# Patient Record
Sex: FEMALE | Race: WHITE | NOT HISPANIC OR LATINO | Employment: OTHER | URBAN - METROPOLITAN AREA
[De-identification: names, ages, dates, MRNs, and addresses within clinical notes are randomized per-mention and may not be internally consistent; named-entity substitution may affect disease eponyms.]

---

## 2017-01-25 ENCOUNTER — OFFICE VISIT (OUTPATIENT)
Dept: MEDICAL GROUP | Facility: MEDICAL CENTER | Age: 77
End: 2017-01-25
Payer: MEDICARE

## 2017-01-25 VITALS
WEIGHT: 154.4 LBS | HEART RATE: 68 BPM | BODY MASS INDEX: 26.36 KG/M2 | DIASTOLIC BLOOD PRESSURE: 70 MMHG | TEMPERATURE: 98.6 F | OXYGEN SATURATION: 93 % | HEIGHT: 64 IN | RESPIRATION RATE: 14 BRPM | SYSTOLIC BLOOD PRESSURE: 116 MMHG

## 2017-01-25 DIAGNOSIS — R53.82 CHRONIC FATIGUE: ICD-10-CM

## 2017-01-25 DIAGNOSIS — M25.511 CHRONIC RIGHT SHOULDER PAIN: ICD-10-CM

## 2017-01-25 DIAGNOSIS — J45.20 ASTHMA IN ADULT, MILD INTERMITTENT, UNCOMPLICATED: ICD-10-CM

## 2017-01-25 DIAGNOSIS — Z23 NEED FOR INFLUENZA VACCINATION: ICD-10-CM

## 2017-01-25 DIAGNOSIS — Z23 NEED FOR SHINGLES VACCINE: ICD-10-CM

## 2017-01-25 DIAGNOSIS — F32.89 OTHER DEPRESSION: ICD-10-CM

## 2017-01-25 DIAGNOSIS — G89.29 CHRONIC RIGHT SHOULDER PAIN: ICD-10-CM

## 2017-01-25 DIAGNOSIS — Z23 NEED FOR PNEUMOCOCCAL VACCINATION: ICD-10-CM

## 2017-01-25 PROCEDURE — 1036F TOBACCO NON-USER: CPT | Performed by: INTERNAL MEDICINE

## 2017-01-25 PROCEDURE — 90662 IIV NO PRSV INCREASED AG IM: CPT | Performed by: INTERNAL MEDICINE

## 2017-01-25 PROCEDURE — 90732 PPSV23 VACC 2 YRS+ SUBQ/IM: CPT | Performed by: INTERNAL MEDICINE

## 2017-01-25 PROCEDURE — G0008 ADMIN INFLUENZA VIRUS VAC: HCPCS | Performed by: INTERNAL MEDICINE

## 2017-01-25 PROCEDURE — G8420 CALC BMI NORM PARAMETERS: HCPCS | Performed by: INTERNAL MEDICINE

## 2017-01-25 PROCEDURE — G0009 ADMIN PNEUMOCOCCAL VACCINE: HCPCS | Performed by: INTERNAL MEDICINE

## 2017-01-25 PROCEDURE — 1101F PT FALLS ASSESS-DOCD LE1/YR: CPT | Performed by: INTERNAL MEDICINE

## 2017-01-25 PROCEDURE — 90736 HZV VACCINE LIVE SUBQ: CPT | Performed by: INTERNAL MEDICINE

## 2017-01-25 PROCEDURE — 99214 OFFICE O/P EST MOD 30 MIN: CPT | Mod: 25 | Performed by: INTERNAL MEDICINE

## 2017-01-25 PROCEDURE — G8482 FLU IMMUNIZE ORDER/ADMIN: HCPCS | Performed by: INTERNAL MEDICINE

## 2017-01-25 PROCEDURE — 4040F PNEUMOC VAC/ADMIN/RCVD: CPT | Performed by: INTERNAL MEDICINE

## 2017-01-25 RX ORDER — FLUTICASONE PROPIONATE 220 UG/1
2 AEROSOL, METERED RESPIRATORY (INHALATION) 2 TIMES DAILY
Qty: 1 INHALER | Refills: 5 | Status: SHIPPED | OUTPATIENT
Start: 2017-01-25 | End: 2017-03-21

## 2017-01-25 NOTE — MR AVS SNAPSHOT
"        Magdi Gutierrezyifan   2017 9:40 AM   Office Visit   MRN: [id number]    Department:  [address] [place] [address]   Dept Phone:  [phone]    Description:  Female : 1940   Provider:  Goldy Owens M.D.           Reason for Visit     Cough states for 5 weeks, had some xrays done end of dec.    Immunizations flu, and pneumonia, and shingles      Allergies as of 2017     Allergen Noted Reactions    Asa [Aspirin] 2010       Does not take D/T asthma hx    Quinine 10/27/2014       Sulfa Drugs 2010   Rash    Skin peeling      You were diagnosed with     Asthma in adult, mild intermittent, uncomplicated   [6617253]       Need for influenza vaccination   [333897]       Need for pneumococcal vaccination   [780064]       Need for shingles vaccine   [277904]       Chronic fatigue   [561050]       Chronic right shoulder pain   [700295]         Vital Signs     Blood Pressure Pulse Temperature Respirations Height Weight    116/70 mmHg 68 37 °C (98.6 °F) 14 1.638 m (5' 4.49\") 70.035 kg (154 lb 6.4 oz)    Body Mass Index Oxygen Saturation Smoking Status             26.10 kg/m2 93% Never Smoker          Basic Information     Date Of Birth Sex Race Ethnicity Preferred Language    1940 Female White Non- English      Your appointments     Mar 21, 2017 11:00 AM   NEW TO YOU with Nicole Holbrook M.D.   [facility] [address]    [facility] [address]  [facility] [id number] [phone]              Problem List              ICD-10-CM Priority Class Noted - Resolved    Osteoarthritis of hands, bilateral M19.041, M19.042   3/26/2014 - Present    Rheumatoid factor positive R76.8   3/26/2014 - Present    Asthma in adult J45.909   3/26/2014 - Present    Lumbar disc disease M51.9   3/26/2014 - Present    Menopause Z78.0   3/26/2014 - Present    Migraines G43.909   3/26/2014 - Present    Hypothyroid E03.9   3/26/2014 - Present    H/O varicose vein ligation Z98.890, Z86.79   " 3/26/2014 - Present    Seasonal allergies J30.2   3/26/2014 - Present    History of breast cancer Z85.3   3/26/2014 - Present    Depression F32.9   3/26/2014 - Present    Bunion of great toe M21.619   3/26/2014 - Present    Bilateral thigh pain M79.651, M79.652   7/19/2016 - Present    Intention tremor G25.2   9/28/2016 - Present      Health Maintenance        Date Due Completion Dates    IMM DTaP/Tdap/Td Vaccine (1 - Tdap) 12/20/1957 ---    IMM ZOSTER VACCINE 12/20/1998 ---    IMM INFLUENZA (1) 9/1/2016 1/11/2016, 10/15/2014    MAMMOGRAM 12/3/2016 12/3/2015    IMM PNEUMOCOCCAL 65+ (ADULT) LOW/MEDIUM RISK SERIES (2 of 2 - PPSV23) 1/11/2017 1/11/2016            Current Immunizations     13-VALENT PCV PREVNAR 1/11/2016 10:25 AM    Influenza TIV (IM) 10/15/2014    Influenza Vaccine Adult HD  Incomplete, 1/11/2016 10:26 AM    Pneumococcal polysaccharide vaccine (PPSV-23)  Incomplete    SHINGLES VACCINE  Incomplete      Below and/or attached are the medications your provider expects you to take. Review all of your home medications and newly ordered medications with your provider and/or pharmacist. Follow medication instructions as directed by your provider and/or pharmacist. Please keep your medication list with you and share with your provider. Update the information when medications are discontinued, doses are changed, or new medications (including over-the-counter products) are added; and carry medication information at all times in the event of emergency situations     Allergies:  ASA - (reactions not documented)     QUININE - (reactions not documented)     SULFA DRUGS - Rash               Medications  Valid as of: January 25, 2017 - 11:10 AM    Generic Name Brand Name Tablet Size Instructions for use    Cyanocobalamin   Take  by mouth every day.        Fluticasone Propionate HFA (Aerosol) FLOVENT  MCG/ACT Inhale 2 Puffs by mouth 2 times a day.        Levalbuterol Tartrate (Aerosol) XOPENEX HFA 45 MCG/ACT  Inhale 2 Puffs by mouth every four hours as needed for Shortness of Breath.        Multiple Vitamins-Minerals   Take 1 Tab by mouth every day.        Thyroid (Tab) ARMOUR THYROID 60 MG Take 1 Tab by mouth every day.        Triamterene-HCTZ (Tab) MAXZIDE-25/DYAZIDE 37.5-25 MG Take 1 Tab by mouth every day.        .                 Medicines prescribed today were sent to:     [facility]ARMACY #[id number] - [place], [place] - [address]    [address] [place] [address]    Phone: [phone] Fax: [phone]    Open 24 Hours?: No      Medication refill instructions:       If your prescription bottle indicates you have medication refills left, it is not necessary to call your provider’s office. Please contact your pharmacy and they will refill your medication.    If your prescription bottle indicates you do not have any refills left, you may request refills at any time through one of the following ways: The online [employer] system (except Urgent Care), by calling your provider’s office, or by asking your pharmacy to contact your provider’s office with a refill request. Medication refills are processed only during regular business hours and may not be available until the next business day. Your provider may request additional information or to have a follow-up visit with you prior to refilling your medication.   *Please Note: Medication refills are assigned a new Rx number when refilled electronically. Your pharmacy may indicate that no refills were authorized even though a new prescription for the same medication is available at the pharmacy. Please request the medicine by name with the pharmacy before contacting your provider for a refill.        Your To Do List     Future Labs/Procedures Complete By Expires    CBC WITH DIFFERENTIAL  As directed 1/26/2018    COMP METABOLIC PANEL  As directed 1/26/2018    CRP QUANTITIVE (NON-CARDIAC)  As directed 1/26/2018    DX-SHOULDER 2+ RIGHT  As directed 1/25/2018    TSH  As directed  1/26/2018         Back& Access Code: [email or web address]  Expires: 2/24/2017 11:10 AM    Back&  A secure, online tool to manage your health information     [employer]’s Back&® is a secure, online tool that connects you to your personalized health information from the privacy of your home -- day or night - making it very easy for you to manage your healthcare. Once the activation process is completed, you can even access your medical information using the Back& suresh, which is available for free in the Apple Suresh store or Google Play store.     Back& provides the following levels of access (as shown below):   My Chart Features   [facility] Primary Care Doctor [facility]  Specialists [facility]  Urgent  Care Non-[facility]  Primary Care  Doctor   Email your healthcare team securely and privately 24/7 X X X    Manage appointments: schedule your next appointment; view details of past/upcoming appointments X      Request prescription refills. X      View recent personal medical records, including lab and immunizations X X X X   View health record, including health history, allergies, medications X X X X   Read reports about your outpatient visits, procedures, consult and ER notes X X X X   See your discharge summary, which is a recap of your hospital and/or ER visit that includes your diagnosis, lab results, and care plan. X X       How to register for Back&:  1. Go to  [email or web address].  2. Click on the Sign Up Now box, which takes you to the New Member Sign Up page. You will need to provide the following information:  a. Enter your Back& Access Code exactly as it appears at the top of this page. (You will not need to use this code after you’ve completed the sign-up process. If you do not sign up before the expiration date, you must request a new code.)   b. Enter your date of birth.   c. Enter your home email address.   d. Click Submit, and follow the next screen’s instructions.  3. Create a Back& ID. This will  be your [employer] login ID and cannot be changed, so think of one that is secure and easy to remember.  4. Create a [employer] password. You can change your password at any time.  5. Enter your Password Reset Question and Answer. This can be used at a later time if you forget your password.   6. Enter your e-mail address. This allows you to receive e-mail notifications when new information is available in [employer].  7. Click Sign Up. You can now view your health information.    For assistance activating your [employer] account, call [phone]

## 2017-01-26 NOTE — PROGRESS NOTES
"Magdi is a 76 y.o. female who is here today because    Chief Complaint   Patient presents with   • Cough     states for 5 weeks, had some xrays done end of dec.   • Immunizations     flu, and pneumonia, and shingles       The patient's current problem list and medication list is:    Patient Active Problem List    Diagnosis Date Noted   • Intention tremor 09/28/2016   • Bilateral thigh pain 07/19/2016   • Osteoarthritis of hands, bilateral 03/26/2014   • Rheumatoid factor positive 03/26/2014   • Asthma in adult 03/26/2014   • Lumbar disc disease 03/26/2014   • Migraines 03/26/2014   • Hypothyroid 03/26/2014   • H/O varicose vein ligation 03/26/2014   • Seasonal allergies 03/26/2014   • History of breast cancer 03/26/2014   • Depression 03/26/2014   • Bunion of great toe 03/26/2014       Current Outpatient Prescriptions   Medication Sig Dispense Refill   • fluticasone (FLOVENT HFA) 220 MCG/ACT Aerosol Inhale 2 Puffs by mouth 2 times a day. 1 Inhaler 5   • triamterene-hctz (MAXZIDE-25/DYAZIDE) 37.5-25 MG Tab Take 1 Tab by mouth every day. 90 Tab 2   • thyroid (ARMOUR THYROID) 60 MG Tab Take 1 Tab by mouth every day. 90 Tab 2   • Cyanocobalamin (VITAMIN B-12 PO) Take  by mouth every day.     • Multiple Vitamin (MULTIVITAMIN PO) Take 1 Tab by mouth every day.     • levalbuterol (XOPENEX HFA) 45 MCG/ACT inhaler Inhale 2 Puffs by mouth every four hours as needed for Shortness of Breath. 1 Inhaler 0     No current facility-administered medications for this visit.     AMENA Fairbanks returns for follow up at 4 months. Diagnoses of Asthma in adult, mild intermittent, uncomplicated, Chronic fatigue, Chronic right shoulder pain, depression, Need for influenza vaccination, Need for pneumococcal vaccination, and Need for shingles vaccine were pertinent to this visit.    1. Asthma in adult, mild intermittent, uncomplicated  No acute breathing problems, but she wonders if the sympathomimetic is making her \"trembly.\"    2. Chronic " "fatigue  Feels tired all the time. Difficult to get out of bed in the morning. Sleeps well.     3. Chronic right shoulder pain  Continuing right shoulder pain, anterior. No trauma. Right dominant.     4. Depression  Likes her home in [place], but is not settled yet. 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     5. Need for influenza vaccination    6. Need for pneumococcal vaccination    7. Need for shingles vaccine    ROS Denies chest pain, shortness of breath, changes in bowel and bladder function, lower extremity edema.    Allergies as of 01/25/2017 - Malachi as Reviewed 01/25/2017   Allergen Reaction Noted   • Asa [aspirin]  02/26/2010   • Quinine  10/27/2014   • Sulfa drugs Rash 02/23/2010      /70 mmHg  Pulse 68  Temp(Src) 37 °C (98.6 °F)  Resp 14  Ht 1.638 m (5' 4.49\")  Wt 70.035 kg (154 lb 6.4 oz)  BMI 26.10 kg/m2  SpO2 93%    PHYSICAL EXAMINATION  Gen:         Alert and oriented, No apparent distress.  Neck:       No Jugular venous distension, Lymphadenopathy, Thyromegaly, Bruits.  Lungs:     Clear to auscultation bilaterally  CV:          Regular rate and rhythm. No murmur heard.  Abd:         Soft, non distended.                Ext:          No clubbing, cyanosis, edema. Anterior right shoulder tenderness. Full ROM.       ASSESSMENT AND PLAN     76 y.o. female     1. Asthma in adult, mild intermittent, uncomplicated  Change from Advair to Flovent to determine if the long-acting bronchodilator is exacerbating her tremor  - fluticasone (FLOVENT HFA) 220 MCG/ACT Aerosol; Inhale 2 Puffs by mouth 2 times a day.  Dispense: 1 Inhaler; Refill: 5    2. Chronic fatigue  - TSH; Future  - COMP METABOLIC PANEL; Future  - CBC WITH DIFFERENTIAL; Future  - CRP QUANTITIVE (NON-CARDIAC); Future    3. Chronic right shoulder pain  - DX-SHOULDER 2+ RIGHT; Future    4. Depression  Mild and chronic. We discussed looking for volunteer opportunities.     5. Need for influenza " vaccination  - INFLUENZA VACCINE, HIGH DOSE (65+ ONLY)    6. Need for pneumococcal vaccination  - PneumoVax PPV23 =>1yo    7. Need for shingles vaccine  - VARICELLA ZOSTER VACCINE SQ      Followup: 2 months. Dr. Holbrook

## 2017-01-27 ENCOUNTER — HOSPITAL ENCOUNTER (OUTPATIENT)
Dept: RADIOLOGY | Facility: MEDICAL CENTER | Age: 77
End: 2017-01-27
Attending: INTERNAL MEDICINE
Payer: MEDICARE

## 2017-01-27 ENCOUNTER — HOSPITAL ENCOUNTER (OUTPATIENT)
Dept: LAB | Facility: MEDICAL CENTER | Age: 77
End: 2017-01-27
Attending: INTERNAL MEDICINE
Payer: MEDICARE

## 2017-01-27 DIAGNOSIS — G89.29 CHRONIC RIGHT SHOULDER PAIN: ICD-10-CM

## 2017-01-27 DIAGNOSIS — M25.511 CHRONIC RIGHT SHOULDER PAIN: ICD-10-CM

## 2017-01-27 DIAGNOSIS — R53.82 CHRONIC FATIGUE: ICD-10-CM

## 2017-01-27 LAB
ALBUMIN SERPL BCP-MCNC: 4.4 G/DL (ref 3.2–4.9)
ALBUMIN/GLOB SERPL: 1.6 G/DL
ALP SERPL-CCNC: 71 U/L (ref 30–99)
ALT SERPL-CCNC: 51 U/L (ref 2–50)
ANION GAP SERPL CALC-SCNC: 5 MMOL/L (ref 0–11.9)
AST SERPL-CCNC: 39 U/L (ref 12–45)
BASOPHILS # BLD AUTO: 0.1 K/UL (ref 0–0.12)
BASOPHILS NFR BLD AUTO: 1.1 % (ref 0–1.8)
BILIRUB SERPL-MCNC: 0.7 MG/DL (ref 0.1–1.5)
BUN SERPL-MCNC: 20 MG/DL (ref 8–22)
CALCIUM SERPL-MCNC: 10.1 MG/DL (ref 8.5–10.5)
CHLORIDE SERPL-SCNC: 103 MMOL/L (ref 96–112)
CO2 SERPL-SCNC: 29 MMOL/L (ref 20–33)
CREAT SERPL-MCNC: 0.61 MG/DL (ref 0.5–1.4)
CRP SERPL HS-MCNC: 1.62 MG/DL (ref 0–0.75)
EOSINOPHIL # BLD: 0.74 K/UL (ref 0–0.51)
EOSINOPHIL NFR BLD AUTO: 7.9 % (ref 0–6.9)
ERYTHROCYTE [DISTWIDTH] IN BLOOD BY AUTOMATED COUNT: 44.7 FL (ref 35.9–50)
GLOBULIN SER CALC-MCNC: 2.8 G/DL (ref 1.9–3.5)
GLUCOSE SERPL-MCNC: 81 MG/DL (ref 65–99)
HCT VFR BLD AUTO: 43.8 % (ref 37–47)
HGB BLD-MCNC: 14.4 G/DL (ref 12–16)
IMM GRANULOCYTES # BLD AUTO: 0.02 K/UL (ref 0–0.11)
IMM GRANULOCYTES NFR BLD AUTO: 0.2 % (ref 0–0.9)
LYMPHOCYTES # BLD: 1.76 K/UL (ref 1–4.8)
LYMPHOCYTES NFR BLD AUTO: 18.7 % (ref 22–41)
MCH RBC QN AUTO: 32.3 PG (ref 27–33)
MCHC RBC AUTO-ENTMCNC: 32.9 G/DL (ref 33.6–35)
MCV RBC AUTO: 98.2 FL (ref 81.4–97.8)
MONOCYTES # BLD: 1.09 K/UL (ref 0–0.85)
MONOCYTES NFR BLD AUTO: 11.6 % (ref 0–13.4)
NEUTROPHILS # BLD: 5.68 K/UL (ref 2–7.15)
NEUTROPHILS NFR BLD AUTO: 60.5 % (ref 44–72)
NRBC # BLD AUTO: 0 K/UL
NRBC BLD-RTO: 0 /100 WBC
PLATELET # BLD AUTO: 395 K/UL (ref 164–446)
PMV BLD AUTO: 9.7 FL (ref 9–12.9)
POTASSIUM SERPL-SCNC: 4.3 MMOL/L (ref 3.6–5.5)
PROT SERPL-MCNC: 7.2 G/DL (ref 6–8.2)
RBC # BLD AUTO: 4.46 M/UL (ref 4.2–5.4)
SODIUM SERPL-SCNC: 137 MMOL/L (ref 135–145)
TSH SERPL DL<=0.005 MIU/L-ACNC: 0.33 UIU/ML (ref 0.3–3.7)
WBC # BLD AUTO: 9.4 K/UL (ref 4.8–10.8)

## 2017-01-27 PROCEDURE — 73030 X-RAY EXAM OF SHOULDER: CPT | Mod: RT

## 2017-02-02 ENCOUNTER — TELEPHONE (OUTPATIENT)
Dept: MEDICAL GROUP | Facility: MEDICAL CENTER | Age: 77
End: 2017-02-02

## 2017-02-02 DIAGNOSIS — J45.21 ASTHMA IN ADULT, MILD INTERMITTENT, WITH ACUTE EXACERBATION: ICD-10-CM

## 2017-02-02 DIAGNOSIS — J30.2 SEASONAL ALLERGIC RHINITIS, UNSPECIFIED ALLERGIC RHINITIS TRIGGER: ICD-10-CM

## 2017-02-02 RX ORDER — LEVALBUTEROL TARTRATE 45 UG/1
2 AEROSOL, METERED ORAL EVERY 4 HOURS PRN
Qty: 1 INHALER | Refills: 0 | Status: SHIPPED | OUTPATIENT
Start: 2017-02-02 | End: 2017-02-03

## 2017-02-02 NOTE — TELEPHONE ENCOUNTER
1. Caller Name: [facility]                      Call Back Number: [phone]    2. Message: States Pt needs a new Rx for ProAir HFA. This med is formulary to Pt's insurance. Can you please write a new Rx and send to [facility]. Thanks    3. Patient approves office to leave a detailed voicemail/MyChart message: N\A

## 2017-02-03 ENCOUNTER — HOSPITAL ENCOUNTER (OUTPATIENT)
Dept: RADIOLOGY | Facility: MEDICAL CENTER | Age: 77
End: 2017-02-03
Attending: NURSE PRACTITIONER
Payer: MEDICARE

## 2017-02-03 ENCOUNTER — OFFICE VISIT (OUTPATIENT)
Dept: MEDICAL GROUP | Facility: MEDICAL CENTER | Age: 77
End: 2017-02-03
Payer: MEDICARE

## 2017-02-03 VITALS
OXYGEN SATURATION: 92 % | RESPIRATION RATE: 16 BRPM | TEMPERATURE: 98.9 F | DIASTOLIC BLOOD PRESSURE: 70 MMHG | BODY MASS INDEX: 25.78 KG/M2 | HEART RATE: 78 BPM | HEIGHT: 64 IN | WEIGHT: 151 LBS | SYSTOLIC BLOOD PRESSURE: 132 MMHG

## 2017-02-03 DIAGNOSIS — J45.901 ASTHMA EXACERBATION: ICD-10-CM

## 2017-02-03 DIAGNOSIS — J45.20 ASTHMA IN ADULT, MILD INTERMITTENT, UNCOMPLICATED: ICD-10-CM

## 2017-02-03 PROCEDURE — 4040F PNEUMOC VAC/ADMIN/RCVD: CPT | Performed by: NURSE PRACTITIONER

## 2017-02-03 PROCEDURE — 99214 OFFICE O/P EST MOD 30 MIN: CPT | Performed by: NURSE PRACTITIONER

## 2017-02-03 PROCEDURE — G8420 CALC BMI NORM PARAMETERS: HCPCS | Performed by: NURSE PRACTITIONER

## 2017-02-03 PROCEDURE — G8432 DEP SCR NOT DOC, RNG: HCPCS | Performed by: NURSE PRACTITIONER

## 2017-02-03 PROCEDURE — 71020 DX-CHEST-2 VIEWS: CPT

## 2017-02-03 PROCEDURE — 1036F TOBACCO NON-USER: CPT | Performed by: NURSE PRACTITIONER

## 2017-02-03 PROCEDURE — 1101F PT FALLS ASSESS-DOCD LE1/YR: CPT | Performed by: NURSE PRACTITIONER

## 2017-02-03 PROCEDURE — G8482 FLU IMMUNIZE ORDER/ADMIN: HCPCS | Performed by: NURSE PRACTITIONER

## 2017-02-03 RX ORDER — AZITHROMYCIN 250 MG/1
TABLET, FILM COATED ORAL
Qty: 1 QUANTITY SUFFICIENT | Refills: 0 | Status: SHIPPED | OUTPATIENT
Start: 2017-02-03 | End: 2017-03-21

## 2017-02-03 RX ORDER — METHYLPREDNISOLONE 4 MG/1
4 TABLET ORAL DAILY
Qty: 30 TAB | Refills: 0 | Status: SHIPPED | OUTPATIENT
Start: 2017-02-03 | End: 2017-03-21

## 2017-02-03 ASSESSMENT — ENCOUNTER SYMPTOMS
SHORTNESS OF BREATH: 1
COUGH: 1
SPUTUM PRODUCTION: 1

## 2017-02-03 NOTE — MR AVS SNAPSHOT
"Magdi Pittman   2/3/2017 10:40 AM   Office Visit   MRN: [id number]    Department:  [address] [place] [address]   Dept Phone:  [phone]    Description:  Female : 1940   Provider:  KORINA wOen           Reason for Visit     Shortness of Breath pt is fighting a brinchitissince dicember .    Cough           Allergies as of 2/3/2017     Allergen Noted Reactions    Asa [Aspirin] 2010       Does not take D/T asthma hx    Quinine 10/27/2014       Sulfa Drugs 2010   Rash    Skin peeling      You were diagnosed with     Asthma exacerbation   [200252]       Asthma in adult, mild intermittent, uncomplicated   [4347776]         Vital Signs     Blood Pressure Pulse Temperature Respirations Height Weight    132/70 mmHg 78 37.2 °C (98.9 °F) 16 1.626 m (5' 4.02\") 68.493 kg (151 lb)    Body Mass Index Oxygen Saturation Smoking Status             25.91 kg/m2 92% Never Smoker          Basic Information     Date Of Birth Sex Race Ethnicity Preferred Language    1940 Female White Non- English      Your appointments     Mar 21, 2017 11:00 AM   NEW TO YOU with Nicole Holbrook M.D.   [facility] [address]    [address] [place] [id number] [phone]              Problem List              ICD-10-CM Priority Class Noted - Resolved    Osteoarthritis of hands, bilateral M19.041, M19.042   3/26/2014 - Present    Rheumatoid factor positive R76.8   3/26/2014 - Present    Asthma in adult J45.909   3/26/2014 - Present    Lumbar disc disease M51.9   3/26/2014 - Present    Migraines G43.909   3/26/2014 - Present    Hypothyroid E03.9   3/26/2014 - Present    H/O varicose vein ligation Z98.890, Z86.79   3/26/2014 - Present    Seasonal allergies J30.2   3/26/2014 - Present    History of breast cancer Z85.3   3/26/2014 - Present    Depression F32.9   3/26/2014 - Present    Bunion of great toe M21.619   3/26/2014 - Present    Bilateral thigh pain M79.651, " M79.652   7/19/2016 - Present    Intention tremor G25.2   9/28/2016 - Present      Health Maintenance        Date Due Completion Dates    IMM DTaP/Tdap/Td Vaccine (1 - Tdap) 12/20/1957 ---            Current Immunizations     13-VALENT PCV PREVNAR 1/11/2016 10:25 AM    Influenza TIV (IM) 10/15/2014    Influenza Vaccine Adult HD 1/25/2017, 1/11/2016 10:26 AM    Pneumococcal polysaccharide vaccine (PPSV-23) 1/25/2017    SHINGLES VACCINE 1/25/2017      Below and/or attached are the medications your provider expects you to take. Review all of your home medications and newly ordered medications with your provider and/or pharmacist. Follow medication instructions as directed by your provider and/or pharmacist. Please keep your medication list with you and share with your provider. Update the information when medications are discontinued, doses are changed, or new medications (including over-the-counter products) are added; and carry medication information at all times in the event of emergency situations     Allergies:  ASA - (reactions not documented)     QUININE - (reactions not documented)     SULFA DRUGS - Rash               Medications  Valid as of: February 03, 2017 - 11:17 AM    Generic Name Brand Name Tablet Size Instructions for use    Albuterol Sulfate (Aero Soln) PROAIR  (90 BASE) MCG/ACT Inhale 2 Puffs by mouth every 6 hours as needed for Shortness of Breath.        Azithromycin (Tab) ZITHROMAX 250 MG One Pack        Cyanocobalamin   Take  by mouth every day.        Fluticasone Propionate HFA (Aerosol) FLOVENT  MCG/ACT Inhale 2 Puffs by mouth 2 times a day.        MethylPREDNISolone (Tab) MEDROL 4 MG Take 1 Tab by mouth every day.        Multiple Vitamins-Minerals   Take 1 Tab by mouth every day.        Thyroid (Tab) ARMOUR THYROID 60 MG Take 1 Tab by mouth every day.        Triamterene-HCTZ (Tab) MAXZIDE-25/DYAZIDE 37.5-25 MG Take 1 Tab by mouth every day.        .                 Medicines  prescribed today were sent to:     [facility]/PHARMACY #[id number] - [place], [place] - [address] [place] [address]    [address] [place] [address] [place] [address]    Phone: [phone] Fax: [phone]    Open 24 Hours?: No      Medication refill instructions:       If your prescription bottle indicates you have medication refills left, it is not necessary to call your provider’s office. Please contact your pharmacy and they will refill your medication.    If your prescription bottle indicates you do not have any refills left, you may request refills at any time through one of the following ways: The online [employer] system (except Urgent Care), by calling your provider’s office, or by asking your pharmacy to contact your provider’s office with a refill request. Medication refills are processed only during regular business hours and may not be available until the next business day. Your provider may request additional information or to have a follow-up visit with you prior to refilling your medication.   *Please Note: Medication refills are assigned a new Rx number when refilled electronically. Your pharmacy may indicate that no refills were authorized even though a new prescription for the same medication is available at the pharmacy. Please request the medicine by name with the pharmacy before contacting your provider for a refill.        Your To Do List     Future Labs/Procedures Complete By Expires    DX-CHEST-2 VIEWS  As directed 8/6/2017         [employer] Access Code: [email or web address]  Expires: 2/24/2017 11:10 AM    [employer]  A secure, online tool to manage your health information     [employer]’s [employer]® is a secure, online tool that connects you to your personalized health information from the privacy of your home -- day or night - making it very easy for you to manage your healthcare. Once the activation process is completed, you can even access your medical information using the [employer] suresh, which is available for free in the [employer] store  or Google Play store.     [employer] provides the following levels of access (as shown below):   My Chart Features   [facility] Primary [facility] Doctor [facility]  Specialists [facility]  Urgent  Care Non-[facility]  Primary Care  Doctor   Email your healthcare team securely and privately 24/7 X X X    Manage appointments: schedule your next appointment; view details of past/upcoming appointments X      Request prescription refills. X      View recent personal medical records, including lab and immunizations X X X X   View health record, including health history, allergies, medications X X X X   Read reports about your outpatient visits, procedures, consult and ER notes X X X X   See your discharge summary, which is a recap of your hospital and/or ER visit that includes your diagnosis, lab results, and care plan. X X       How to register for [employer]:  1. Go to  [email or web address].  2. Click on the Sign Up Now box, which takes you to the New Member Sign Up page. You will need to provide the following information:  a. Enter your [employer] Access Code exactly as it appears at the top of this page. (You will not need to use this code after you’ve completed the sign-up process. If you do not sign up before the expiration date, you must request a new code.)   b. Enter your date of birth.   c. Enter your home email address.   d. Click Submit, and follow the next screen’s instructions.  3. Create a [employer] ID. This will be your [employer] login ID and cannot be changed, so think of one that is secure and easy to remember.  4. Create a [employer] password. You can change your password at any time.  5. Enter your Password Reset Question and Answer. This can be used at a later time if you forget your password.   6. Enter your e-mail address. This allows you to receive e-mail notifications when new information is available in [employer].  7. Click Sign Up. You can now view your health information.    For assistance activating your [employer] account, call  [phone]

## 2017-02-03 NOTE — PROGRESS NOTES
Subjective:      Magdi Pittman is a 76 y.o. female who presents with Shortness of Breath and Cough            Shortness of Breath  Associated symptoms include sputum production.   Cough  Associated symptoms include shortness of breath.   Magdi Pittman is a patient of Dr. Owens here today for continuing cough.    Patient has history of asthma for which she was on Advair with albuterol for rescue. She recently was appropriately switched to Flovent because there was concern the long-acting beta agonist might have been contributing to her tremor. She also was switched to Xopenex but it was not covered by her insurance. She states her asthma is normally well controlled but started having problems after Maria R.    She was seen at urgent care on 12/26/16 for cough and started on doxycycline but she stopped it after about 2 days because of GI upset. Chest x-ray was done which came back normal. She saw Dr. Owens for follow-up and the changes in her inhalers were made. She states she is still having frequent cough despite using her albuterol and Flovent inhaler. She also feels she is bringing up some phlegm. She states there is no associated chest pain, leg pain or swelling, edema of the extremities, shortness of breath, tachycardia or dizziness. Vital signs are normal. She is alert and pleasant and talkative in the office.        Current Outpatient Prescriptions   Medication Sig Dispense Refill   • methylPREDNISolone (MEDROL) 4 MG Tab Take 1 Tab by mouth every day. 30 Tab 0   • azithromycin (ZITHROMAX Z-DANII) 250 MG Tab One Pack 1 Quantity Sufficient 0   • PROAIR  (90 BASE) MCG/ACT Aero Soln inhalation aerosol Inhale 2 Puffs by mouth every 6 hours as needed for Shortness of Breath. 1 Inhaler 3   • fluticasone (FLOVENT HFA) 220 MCG/ACT Aerosol Inhale 2 Puffs by mouth 2 times a day. 1 Inhaler 5   • triamterene-hctz (MAXZIDE-25/DYAZIDE) 37.5-25 MG Tab Take 1 Tab by mouth every day. 90 Tab 2   • thyroid (ARMOUR  "THYROID) 60 MG Tab Take 1 Tab by mouth every day. 90 Tab 2   • Cyanocobalamin (VITAMIN B-12 PO) Take  by mouth every day.     • Multiple Vitamin (MULTIVITAMIN PO) Take 1 Tab by mouth every day.       No current facility-administered medications for this visit.     Social History   Substance Use Topics   • Smoking status: Never Smoker    • Smokeless tobacco: Never Used   • Alcohol Use: 2.0 oz/week     4 Standard drinks or equivalent per week      Comment: 4 per week     Family History   Problem Relation Age of Onset   • Cancer Mother    • Lung Disease Father    • Cancer Sister    • Cancer Brother      Past Medical History   Diagnosis Date   • Arthritis    • ASTHMA    • Unspecified disorder of thyroid    • Breath shortness    • Depression        Review of Systems   Respiratory: Positive for cough, sputum production and shortness of breath.    All other systems reviewed and are negative.         Objective:     /70 mmHg  Pulse 78  Temp(Src) 37.2 °C (98.9 °F)  Resp 16  Ht 1.626 m (5' 4.02\")  Wt 68.493 kg (151 lb)  BMI 25.91 kg/m2  SpO2 92%     Physical Exam   Constitutional: She is oriented to person, place, and time. She appears well-developed and well-nourished. No distress.   HENT:   Head: Normocephalic and atraumatic.   Right Ear: External ear normal.   Left Ear: External ear normal.   Nose: Nose normal.   Eyes: Right eye exhibits no discharge. Left eye exhibits no discharge.   Neck: Normal range of motion. Neck supple. No thyromegaly present.   Cardiovascular: Normal rate, regular rhythm and normal heart sounds.  Exam reveals no gallop and no friction rub.    No murmur heard.  Pulmonary/Chest: Effort normal. She has no wheezes. She has rhonchi. She has no rales.   Musculoskeletal: She exhibits no edema or tenderness.   Neurological: She is alert and oriented to person, place, and time. She displays tremor. She displays normal reflexes.   Skin: Skin is warm and dry. No rash noted. She is not diaphoretic. "   Psychiatric: She has a normal mood and affect. Her behavior is normal. Judgment and thought content normal.   Nursing note and vitals reviewed.              Assessment/Plan:     1. Asthma exacerbation  Patient will be sent for chest x-ray to see if there is any change and in the meantime I will start her on a Medrol Dosepak and a Z-Danii. She was advised to continue with her albuterol, 2 puffs up to every 6 hours as needed. She thought the prescription meant she had to use it regularly instead of when necessary. I told her if symptoms do not improve she may need pulmonary function studies or referral. She does not meet the Wells criteria for pulmonary embolism.  - DX-CHEST-2 VIEWS; Future  - methylPREDNISolone (MEDROL) 4 MG Tab; Take 1 Tab by mouth every day.  Dispense: 30 Tab; Refill: 0  - azithromycin (ZITHROMAX Z-DANII) 250 MG Tab; One Pack  Dispense: 1 Quantity Sufficient; Refill: 0    2. Asthma in adult, mild intermittent, uncomplicated  Patient may need additional preventative medicine in the future if she continues to have issues with her asthma.

## 2017-03-21 ENCOUNTER — OFFICE VISIT (OUTPATIENT)
Dept: MEDICAL GROUP | Facility: MEDICAL CENTER | Age: 77
End: 2017-03-21
Payer: MEDICARE

## 2017-03-21 VITALS
BODY MASS INDEX: 25.61 KG/M2 | SYSTOLIC BLOOD PRESSURE: 110 MMHG | WEIGHT: 150 LBS | HEART RATE: 84 BPM | HEIGHT: 64 IN | TEMPERATURE: 96.4 F | RESPIRATION RATE: 16 BRPM | DIASTOLIC BLOOD PRESSURE: 68 MMHG | OXYGEN SATURATION: 94 %

## 2017-03-21 DIAGNOSIS — R05.9 COUGH: ICD-10-CM

## 2017-03-21 DIAGNOSIS — E03.4 HYPOTHYROIDISM DUE TO ACQUIRED ATROPHY OF THYROID: ICD-10-CM

## 2017-03-21 DIAGNOSIS — R63.4 WEIGHT LOSS: ICD-10-CM

## 2017-03-21 DIAGNOSIS — R74.01 ELEVATED ALT MEASUREMENT: ICD-10-CM

## 2017-03-21 DIAGNOSIS — R53.83 FATIGUE, UNSPECIFIED TYPE: ICD-10-CM

## 2017-03-21 DIAGNOSIS — Z85.3 HX OF BREAST CANCER: ICD-10-CM

## 2017-03-21 PROCEDURE — 1101F PT FALLS ASSESS-DOCD LE1/YR: CPT | Performed by: FAMILY MEDICINE

## 2017-03-21 PROCEDURE — 1036F TOBACCO NON-USER: CPT | Performed by: FAMILY MEDICINE

## 2017-03-21 PROCEDURE — 99213 OFFICE O/P EST LOW 20 MIN: CPT | Performed by: FAMILY MEDICINE

## 2017-03-21 PROCEDURE — G8432 DEP SCR NOT DOC, RNG: HCPCS | Performed by: FAMILY MEDICINE

## 2017-03-21 PROCEDURE — G8482 FLU IMMUNIZE ORDER/ADMIN: HCPCS | Performed by: FAMILY MEDICINE

## 2017-03-21 PROCEDURE — 4040F PNEUMOC VAC/ADMIN/RCVD: CPT | Performed by: FAMILY MEDICINE

## 2017-03-21 PROCEDURE — G8420 CALC BMI NORM PARAMETERS: HCPCS | Performed by: FAMILY MEDICINE

## 2017-03-21 RX ORDER — ESCITALOPRAM OXALATE 10 MG/1
10 TABLET ORAL DAILY
Qty: 90 TAB | Refills: 0 | Status: SHIPPED | OUTPATIENT
Start: 2017-03-21 | End: 2017-04-20

## 2017-03-21 RX ORDER — FLUTICASONE PROPIONATE AND SALMETEROL XINAFOATE 115; 21 UG/1; UG/1
1 AEROSOL, METERED RESPIRATORY (INHALATION) 2 TIMES DAILY
Qty: 3 INHALER | Refills: 3 | Status: SHIPPED | OUTPATIENT
Start: 2017-03-21 | End: 2018-06-07

## 2017-03-21 NOTE — PROGRESS NOTES
cc:  Cough, fatigue    Subjective:     Magdi Pittman is a 76 y.o. female presenting to establish care and to discuss her cough and fatigue.  She initially had a cough, shortness of breath, and URI symptoms overall in late December.  She went to urgent care, cxr was normal, was tx-ed with albuterol and doxy.  Her breathing improved but cough continued and she felt very tired, saw her pcp on January.  Labs normal at that time except for an elevated ALT.  She saw another provider in February for the persistent fatigue and cough, cxr was clear, started z-reina and merol 4mg daily for a month.  Since then, she reports that her arthritis has improved somewhat, but she feels the same.  Same dry cough, fatigue, poor appetite, has lost about 10 lbs over the past 3 months, intermittent headaches.  Has been dealing with her tremors, this has been stable.  Her previous pcp thought changing from advair to flovent may help, but she has not noticed an improvement and would like to go back to advair.  Otherwise, no nausea/vomiting, abdominal pain, diarrhea, constipation, blood in urine/stools, night sweats.  Mood has been down somewhat, thinks that her symptoms might be due to depression.  Has a hx of depression in the past, was on lexapro but currently not taking an anti-depressant.  Phq9: 17. No suicidal thoughts    Review of systems:  See above.          Current outpatient prescriptions:   •  FLAXSEED, LINSEED, PO, Take  by mouth., Disp: , Rfl:   •  fluticasone-salmeterol (ADVAIR HFA) 115-21 MCG/ACT inhaler, Inhale 1 Puff by mouth 2 times a day., Disp: 3 Inhaler, Rfl: 3  •  escitalopram (LEXAPRO) 10 MG Tab, Take 1 Tab by mouth every day., Disp: 90 Tab, Rfl: 0  •  PROAIR  (90 BASE) MCG/ACT Aero Soln inhalation aerosol, Inhale 2 Puffs by mouth every 6 hours as needed for Shortness of Breath., Disp: 1 Inhaler, Rfl: 3  •  triamterene-hctz (MAXZIDE-25/DYAZIDE) 37.5-25 MG Tab, Take 1 Tab by mouth every day., Disp: 90 Tab, Rfl:  2  •  thyroid (ARMOUR THYROID) 60 MG Tab, Take 1 Tab by mouth every day., Disp: 90 Tab, Rfl: 2  •  Cyanocobalamin (VITAMIN B-12 PO), Take  by mouth every day., Disp: , Rfl:   •  Multiple Vitamin (MULTIVITAMIN PO), Take 1 Tab by mouth every day., Disp: , Rfl:     Allergies   Allergen Reactions   • Asa [Aspirin]      Does not take D/T asthma hx   • Quinine    • Sulfa Drugs Rash     Skin peeling       Past Medical History   Diagnosis Date   • Arthritis    • ASTHMA    • Unspecified disorder of thyroid    • Depression    • H/O varicose vein ligation 3/26/2014   • Lumbar disc disease 3/26/2014   • History of breast cancer 3/26/2014   • Intention tremor 9/28/2016   • Rheumatoid factor positive 3/26/2014     -CCP, Negative hepatitis panel     • Migraines 3/26/2014     Past Surgical History   Procedure Laterality Date   • Pr removal synovial cyst,knee  1948     right knee   • Vein ligation  1966     bilateral legs   • Tubal ligation  1973   • Breast biopsy  1974     left breast   • Tonsillectomy  1958   • Basal cell excision  2009     left leg   • Knee arthroscopy Right 2/26/2010     Procedure: WITH DEBRIDEMENT MEDIAL FEMORAL CONDYLE & PATELLA;  Surgeon: Gregorio Sloan M.D.;  Location: SUR[facility];  Service:    • Meniscectomy Right 2/26/2010     Procedure: PARTIAL LATERAL  ;  Surgeon: Gregorio Sloan M.D.;  Location: SUR[facility]ERY [facility];  Service:      Family History   Problem Relation Age of Onset   • Cancer Mother      breast   • Lung Disease Father    • Cancer Sister      breast, pancreatic   • Cancer Brother    • Cancer Maternal Aunt    • Cancer Maternal Grandmother      Social History     Social History   • Marital Status:      Spouse Name: N/A   • Number of Children: N/A   • Years of Education: N/A     Occupational History   • Not on file.     Social History Main Topics   • Smoking status: Never Smoker    • Smokeless tobacco: Never Used   • Alcohol Use: 2.0 oz/week     4 Standard drinks or  "equivalent per week      Comment: 4 per week   • Drug Use: No   • Sexual Activity: Not Currently     Other Topics Concern   • Not on file     Social History Narrative       Objective:     Vitals: /68 mmHg  Pulse 84  Temp(Src) 35.8 °C (96.4 °F)  Resp 16  Ht 1.626 m (5' 4\")  Wt 68.04 kg (150 lb)  BMI 25.73 kg/m2  SpO2 94%  General: Alert, pleasant, NAD  HEENT: Normocephalic.  PERRL, EOMI, no icterus or pallor.  Conjunctivae and lids normal. External ears normal.  Neck supple.  No thyromegaly or masses palpated. No cervical or supraclavicular lymphadenopathy.  Heart: Regular rate and rhythm.  S1 and S2 normal.  No murmurs appreciated.  Respiratory: Normal respiratory effort.  Clear to auscultation bilaterally.    Abdomen: Non-distended, soft  Extremities: No leg edema on the right, minimal on the left.    Psych:  Affect/mood is normal, judgement is good, memory is intact, grooming is appropriate.    Phq9: 17, very difficult    Assessment/Plan:     Magdi was seen today for cough.    Diagnoses and all orders for this visit:    Cough  Fatigue, unspecified type  Weight loss  Elevated ALT measurement  Hx of breast cancer  Hypothyroidism due to acquired atrophy of thyroid  Will repeat the following labs although they were basically normal 2 months ago, check a CT scan given her symptoms and hx of breast cancer. Will also start lexapro as this may be depression if work up is normal.  F/u in 4 weeks.  -     CBC WITHOUT DIFFERENTIAL; Future  -     TSH; Future  -     COMP METABOLIC PANEL; Future  -     CT-CHEST,ABDOMEN,PELVIS W/O; Future        -     escitalopram (LEXAPRO) 10 MG Tab; Take 1 Tab by mouth every day.    Other orders  -     fluticasone-salmeterol (ADVAIR HFA) 115-21 MCG/ACT inhaler; Inhale 1 Puff by mouth 2 times a day.      Return in about 4 weeks (around 4/18/2017) for Med check (long, 40min).  "

## 2017-03-21 NOTE — MR AVS SNAPSHOT
"        Magdi Pittman   3/21/2017 11:00 AM   Office Visit   MRN: [id number]    Department:  [address] [place] [address]   Dept Phone:  [phone]    Description:  Female : 1940   Provider:  Nicole Holbrook M.D.           Reason for Visit     Cough x      Allergies as of 3/21/2017     Allergen Noted Reactions    Asa [Aspirin] 2010       Does not take D/T asthma hx    Quinine 10/27/2014       Sulfa Drugs 2010   Rash    Skin peeling      You were diagnosed with     Cough   [786.2.ICD-9-CM]       Fatigue, unspecified type   [5088918]       Weight loss   [474151]       Hx of breast cancer   [152033]       Hypothyroidism due to acquired atrophy of thyroid   [6722721]       Elevated ALT measurement   [819347]         Vital Signs     Blood Pressure Pulse Temperature Respirations Height Weight    110/68 mmHg 84 35.8 °C (96.4 °F) 16 1.626 m (5' 4\") 68.04 kg (150 lb)    Body Mass Index Oxygen Saturation Smoking Status             25.73 kg/m2 94% Never Smoker          Basic Information     Date Of Birth Sex Race Ethnicity Preferred Language    1940 Female White Non- English      Your appointments     2017  9:40 AM   Established Patient with Nicole Holbrook M.D.   [facility] [address]     [facility] [address] [place] [id number] [phone]           You will be receiving a confirmation call a few days before your appointment from our automated call confirmation system.              Problem List              ICD-10-CM Priority Class Noted - Resolved    Osteoarthritis of hands, bilateral M19.041, M19.042   3/26/2014 - Present    Asthma in adult J45.909   3/26/2014 - Present    Hypothyroid E03.9   3/26/2014 - Present    Bunion of great toe M21.619   3/26/2014 - Present    Hx of breast cancer Z85.3   3/21/2017 - Present      Health Maintenance        Date Due Completion Dates    IMM DTaP/Tdap/Td Vaccine (1 - Tdap) 1959 ---            Current " Immunizations     13-VALENT PCV PREVNAR 1/11/2016 10:25 AM    Influenza TIV (IM) 10/15/2014    Influenza Vaccine Adult HD 1/25/2017, 1/11/2016 10:26 AM    Pneumococcal polysaccharide vaccine (PPSV-23) 1/25/2017    SHINGLES VACCINE 1/25/2017      Below and/or attached are the medications your provider expects you to take. Review all of your home medications and newly ordered medications with your provider and/or pharmacist. Follow medication instructions as directed by your provider and/or pharmacist. Please keep your medication list with you and share with your provider. Update the information when medications are discontinued, doses are changed, or new medications (including over-the-counter products) are added; and carry medication information at all times in the event of emergency situations     Allergies:  ASA - (reactions not documented)     QUININE - (reactions not documented)     SULFA DRUGS - Rash               Medications  Valid as of: March 21, 2017 - 11:19 AM    Generic Name Brand Name Tablet Size Instructions for use    Albuterol Sulfate (Aero Soln) PROAIR  (90 BASE) MCG/ACT Inhale 2 Puffs by mouth every 6 hours as needed for Shortness of Breath.        Cyanocobalamin   Take  by mouth every day.        Escitalopram Oxalate (Tab) LEXAPRO 10 MG Take 1 Tab by mouth every day.        Flaxseed (Linseed)   Take  by mouth.        Fluticasone-Salmeterol (Aerosol) ADVAIR -21 MCG/ACT Inhale 1 Puff by mouth 2 times a day.        Multiple Vitamins-Minerals   Take 1 Tab by mouth every day.        Thyroid (Tab) ARMOUR THYROID 60 MG Take 1 Tab by mouth every day.        Triamterene-HCTZ (Tab) MAXZIDE-25/DYAZIDE 37.5-25 MG Take 1 Tab by mouth every day.        .                 Medicines prescribed today were sent to:     [id number] - [place] - [address]    [address] [place] [address]    Phone: [phone] Fax: [phone]    Open 24 Hours?: No      Medication refill  instructions:       If your prescription bottle indicates you have medication refills left, it is not necessary to call your provider’s office. Please contact your pharmacy and they will refill your medication.    If your prescription bottle indicates you do not have any refills left, you may request refills at any time through one of the following ways: The online [employer] system (except Urgent Care), by calling your provider’s office, or by asking your pharmacy to contact your provider’s office with a refill request. Medication refills are processed only during regular business hours and may not be available until the next business day. Your provider may request additional information or to have a follow-up visit with you prior to refilling your medication.   *Please Note: Medication refills are assigned a new Rx number when refilled electronically. Your pharmacy may indicate that no refills were authorized even though a new prescription for the same medication is available at the pharmacy. Please request the medicine by name with the pharmacy before contacting your provider for a refill.        Your To Do List     Future Labs/Procedures Complete By Expires    CBC WITHOUT DIFFERENTIAL  As directed 9/18/2017    COMP METABOLIC PANEL  As directed 3/21/2018    CT-CHEST,ABDOMEN,PELVIS W/O  As directed 3/21/2018    TSH  As directed 3/21/2018         [employer] Access Code: [email or web address]  Expires: 4/7/2017  1:27 PM    [employer]  A secure, online tool to manage your health information     [employer]’s [employer]® is a secure, online tool that connects you to your personalized health information from the privacy of your home -- day or night - making it very easy for you to manage your healthcare. Once the activation process is completed, you can even access your medical information using the [employer] suresh, which is available for free in the [employer] store or Google Play store.     [employer] provides the following levels of  access (as shown below):   My Chart Features   [facility] Doctor [facility]  Specialists [facility]  Urgent  Care Non-[facility]  Primary Care  Doctor   Email your healthcare team securely and privately 24/7 X X X    Manage appointments: schedule your next appointment; view details of past/upcoming appointments X      Request prescription refills. X      View recent personal medical records, including lab and immunizations X X X X   View health record, including health history, allergies, medications X X X X   Read reports about your outpatient visits, procedures, consult and ER notes X X X X   See your discharge summary, which is a recap of your hospital and/or ER visit that includes your diagnosis, lab results, and care plan. X X       How to register for [employer]:  1. Go to  [email or web address].  2. Click on the Sign Up Now box, which takes you to the New Member Sign Up page. You will need to provide the following information:  a. Enter your [employer] Access Code exactly as it appears at the top of this page. (You will not need to use this code after you’ve completed the sign-up process. If you do not sign up before the expiration date, you must request a new code.)   b. Enter your date of birth.   c. Enter your home email address.   d. Click Submit, and follow the next screen’s instructions.  3. Create a [employer] ID. This will be your [employer] login ID and cannot be changed, so think of one that is secure and easy to remember.  4. Create a [employer] password. You can change your password at any time.  5. Enter your Password Reset Question and Answer. This can be used at a later time if you forget your password.   6. Enter your e-mail address. This allows you to receive e-mail notifications when new information is available in [employer].  7. Click Sign Up. You can now view your health information.    For assistance activating your [employer] account, call [phone]

## 2017-03-28 ENCOUNTER — TELEPHONE (OUTPATIENT)
Dept: MEDICAL GROUP | Facility: MEDICAL CENTER | Age: 77
End: 2017-03-28

## 2017-03-28 DIAGNOSIS — Z85.3 HX OF BREAST CANCER: ICD-10-CM

## 2017-03-28 DIAGNOSIS — R74.01 ELEVATED ALT MEASUREMENT: ICD-10-CM

## 2017-03-28 DIAGNOSIS — R63.4 WEIGHT LOSS: ICD-10-CM

## 2017-03-28 DIAGNOSIS — R53.83 FATIGUE, UNSPECIFIED TYPE: ICD-10-CM

## 2017-03-28 DIAGNOSIS — R05.9 COUGH: ICD-10-CM

## 2017-03-28 NOTE — TELEPHONE ENCOUNTER
1. Caller Name: hesham                      Call Back Number: [phone]    2. Message: patient has ct scan today and the imaging dept. Wanted to know if you can change the order with contrast    3. Patient approves office to leave a detailed voicemail/MyChart message: N\A

## 2017-03-30 ENCOUNTER — HOSPITAL ENCOUNTER (OUTPATIENT)
Dept: LAB | Facility: MEDICAL CENTER | Age: 77
End: 2017-03-30
Attending: FAMILY MEDICINE
Payer: MEDICARE

## 2017-03-30 ENCOUNTER — HOSPITAL ENCOUNTER (OUTPATIENT)
Dept: RADIOLOGY | Facility: MEDICAL CENTER | Age: 77
End: 2017-03-30
Attending: FAMILY MEDICINE
Payer: MEDICARE

## 2017-03-30 DIAGNOSIS — R05.9 COUGH: ICD-10-CM

## 2017-03-30 DIAGNOSIS — Z85.3 HX OF BREAST CANCER: ICD-10-CM

## 2017-03-30 DIAGNOSIS — R63.4 WEIGHT LOSS: ICD-10-CM

## 2017-03-30 DIAGNOSIS — R53.83 FATIGUE, UNSPECIFIED TYPE: ICD-10-CM

## 2017-03-30 DIAGNOSIS — E03.4 HYPOTHYROIDISM DUE TO ACQUIRED ATROPHY OF THYROID: ICD-10-CM

## 2017-03-30 DIAGNOSIS — R74.01 ELEVATED ALT MEASUREMENT: ICD-10-CM

## 2017-03-30 LAB
ALBUMIN SERPL BCP-MCNC: 3.8 G/DL (ref 3.2–4.9)
ALBUMIN/GLOB SERPL: 0.9 G/DL
ALP SERPL-CCNC: 73 U/L (ref 30–99)
ALT SERPL-CCNC: 25 U/L (ref 2–50)
ANION GAP SERPL CALC-SCNC: 10 MMOL/L (ref 0–11.9)
AST SERPL-CCNC: 24 U/L (ref 12–45)
BILIRUB SERPL-MCNC: 0.6 MG/DL (ref 0.1–1.5)
BUN SERPL-MCNC: 18 MG/DL (ref 8–22)
CALCIUM SERPL-MCNC: 9.4 MG/DL (ref 8.5–10.5)
CHLORIDE SERPL-SCNC: 95 MMOL/L (ref 96–112)
CO2 SERPL-SCNC: 25 MMOL/L (ref 20–33)
CREAT SERPL-MCNC: 0.63 MG/DL (ref 0.5–1.4)
ERYTHROCYTE [DISTWIDTH] IN BLOOD BY AUTOMATED COUNT: 45.8 FL (ref 35.9–50)
GLOBULIN SER CALC-MCNC: 4.2 G/DL (ref 1.9–3.5)
GLUCOSE SERPL-MCNC: 77 MG/DL (ref 65–99)
HCT VFR BLD AUTO: 44.9 % (ref 37–47)
HGB BLD-MCNC: 14.9 G/DL (ref 12–16)
MCH RBC QN AUTO: 31.8 PG (ref 27–33)
MCHC RBC AUTO-ENTMCNC: 33.2 G/DL (ref 33.6–35)
MCV RBC AUTO: 95.9 FL (ref 81.4–97.8)
PLATELET # BLD AUTO: 501 K/UL (ref 164–446)
PMV BLD AUTO: 9.8 FL (ref 9–12.9)
POTASSIUM SERPL-SCNC: 3.8 MMOL/L (ref 3.6–5.5)
PROT SERPL-MCNC: 8 G/DL (ref 6–8.2)
RBC # BLD AUTO: 4.68 M/UL (ref 4.2–5.4)
SODIUM SERPL-SCNC: 130 MMOL/L (ref 135–145)
TSH SERPL DL<=0.005 MIU/L-ACNC: 2.15 UIU/ML (ref 0.3–3.7)
WBC # BLD AUTO: 9.5 K/UL (ref 4.8–10.8)

## 2017-03-30 PROCEDURE — 700117 HCHG RX CONTRAST REV CODE 255: Performed by: FAMILY MEDICINE

## 2017-03-30 PROCEDURE — 71260 CT THORAX DX C+: CPT

## 2017-03-30 RX ADMIN — IOHEXOL 100 ML: 350 INJECTION, SOLUTION INTRAVENOUS at 11:00

## 2017-03-31 ENCOUNTER — TELEPHONE (OUTPATIENT)
Dept: MEDICAL GROUP | Facility: MEDICAL CENTER | Age: 77
End: 2017-03-31

## 2017-03-31 DIAGNOSIS — E07.9 THYROID DISORDER: ICD-10-CM

## 2017-03-31 RX ORDER — THYROID 60 MG/1
60 TABLET ORAL DAILY
Qty: 90 TAB | Refills: 3 | Status: SHIPPED | OUTPATIENT
Start: 2017-03-31 | End: 2018-04-15 | Stop reason: SDUPTHER

## 2017-03-31 NOTE — TELEPHONE ENCOUNTER
1. Caller Name:Magdi Pittman                                           Call Back Number: [phone] (home) [phone] (work)        Patient approves a detailed voicemail message: N\A    Patient wanted to know if she could get her results from her CT Scan? Please advise

## 2017-04-01 NOTE — TELEPHONE ENCOUNTER
Called pt to discuss labs and CT results.  There is a small spot on the lung and cyst on liver.  She reports that she has lost another 5 lbs since our last visit and coughed up something black today.  Her breast cancer was diagnosed 6 years ago.  She will make an appt to see her breast cancer doctor to see what the next steps should be.  Offered option of sending her to the [facility] here for further work up as well, but she will see her previous doctor first.

## 2017-04-20 ENCOUNTER — OFFICE VISIT (OUTPATIENT)
Dept: MEDICAL GROUP | Facility: MEDICAL CENTER | Age: 77
End: 2017-04-20
Payer: MEDICARE

## 2017-04-20 VITALS
RESPIRATION RATE: 14 BRPM | OXYGEN SATURATION: 92 % | SYSTOLIC BLOOD PRESSURE: 112 MMHG | TEMPERATURE: 98.6 F | WEIGHT: 143 LBS | BODY MASS INDEX: 24.41 KG/M2 | HEART RATE: 72 BPM | DIASTOLIC BLOOD PRESSURE: 68 MMHG | HEIGHT: 64 IN

## 2017-04-20 DIAGNOSIS — F33.1 MODERATE EPISODE OF RECURRENT MAJOR DEPRESSIVE DISORDER (HCC): ICD-10-CM

## 2017-04-20 PROCEDURE — 99213 OFFICE O/P EST LOW 20 MIN: CPT | Performed by: FAMILY MEDICINE

## 2017-04-20 PROCEDURE — 1101F PT FALLS ASSESS-DOCD LE1/YR: CPT | Performed by: FAMILY MEDICINE

## 2017-04-20 PROCEDURE — 1036F TOBACCO NON-USER: CPT | Performed by: FAMILY MEDICINE

## 2017-04-20 PROCEDURE — G8420 CALC BMI NORM PARAMETERS: HCPCS | Performed by: FAMILY MEDICINE

## 2017-04-20 PROCEDURE — G8432 DEP SCR NOT DOC, RNG: HCPCS | Performed by: FAMILY MEDICINE

## 2017-04-20 PROCEDURE — 4040F PNEUMOC VAC/ADMIN/RCVD: CPT | Performed by: FAMILY MEDICINE

## 2017-04-20 RX ORDER — SERTRALINE HYDROCHLORIDE 25 MG/1
25 TABLET, FILM COATED ORAL DAILY
Qty: 90 TAB | Refills: 0 | Status: SHIPPED | OUTPATIENT
Start: 2017-04-20 | End: 2017-07-25 | Stop reason: SDUPTHER

## 2017-04-20 NOTE — MR AVS SNAPSHOT
"        Magdi Zain   2017 9:40 AM   Office Visit   MRN: [id number]    Department:  [address] [place] [address]   Dept Phone:  [phone]    Description:  Female : 1940   Provider:  Nicole Holbrook M.D.           Reason for Visit     Hypothyroidism           Allergies as of 2017     Allergen Noted Reactions    Asa [Aspirin] 2010       Does not take D/T asthma hx    Quinine 10/27/2014       Sulfa Drugs 2010   Rash    Skin peeling      You were diagnosed with     Moderate episode of recurrent major depressive disorder (CMS-[facility])   [2519802]         Vital Signs     Blood Pressure Pulse Temperature Respirations Height Weight    112/68 mmHg 72 37 °C (98.6 °F) 14 1.626 m (5' 4\") 64.864 kg (143 lb)    Body Mass Index Oxygen Saturation Smoking Status             24.53 kg/m2 92% Never Smoker          Basic Information     Date Of Birth Sex Race Ethnicity Preferred Language    1940 Female White Non- English      Your appointments     2017 11:20 AM   Established Patient with Nicole Holbrook M.D.   [facility] [address]    [address]  [facility] [address]  [facility] [id number] [phone]           You will be receiving a confirmation call a few days before your appointment from our automated call confirmation system.              Problem List              ICD-10-CM Priority Class Noted - Resolved    Osteoarthritis of hands, bilateral M19.041, M19.042   3/26/2014 - Present    Asthma in adult J45.909   3/26/2014 - Present    Hypothyroid E03.9   3/26/2014 - Present    Bunion of great toe M21.619   3/26/2014 - Present    Hx of breast cancer Z85.3   3/21/2017 - Present      Health Maintenance        Date Due Completion Dates    IMM DTaP/Tdap/Td Vaccine (1 - Tdap) 1957 ---            Current Immunizations     13-VALENT PCV PREVNAR 2016 10:25 AM    Influenza TIV (IM) 10/15/2014    Influenza Vaccine Adult HD 2017, 2016 10:26 AM   " Pneumococcal polysaccharide vaccine (PPSV-23) 1/25/2017    SHINGLES VACCINE 1/25/2017      Below and/or attached are the medications your provider expects you to take. Review all of your home medications and newly ordered medications with your provider and/or pharmacist. Follow medication instructions as directed by your provider and/or pharmacist. Please keep your medication list with you and share with your provider. Update the information when medications are discontinued, doses are changed, or new medications (including over-the-counter products) are added; and carry medication information at all times in the event of emergency situations     Allergies:  ASA - (reactions not documented)     QUININE - (reactions not documented)     SULFA DRUGS - Rash               Medications  Valid as of: April 20, 2017 -  9:59 AM    Generic Name Brand Name Tablet Size Instructions for use    Albuterol Sulfate (Aero Soln) PROAIR  (90 BASE) MCG/ACT Inhale 2 Puffs by mouth every 6 hours as needed for Shortness of Breath.        Cyanocobalamin   Take  by mouth every day.        Flaxseed (Linseed)   Take  by mouth.        Fluticasone-Salmeterol (Aerosol) ADVAIR -21 MCG/ACT Inhale 1 Puff by mouth 2 times a day.        Multiple Vitamins-Minerals   Take 1 Tab by mouth every day.        Sertraline HCl (Tab) ZOLOFT 25 MG Take 1 Tab by mouth every day.        Thyroid (Tab) ARMOUR THYROID 60 MG Take 1 Tab by mouth every day.        Triamterene-HCTZ (Tab) MAXZIDE-25/DYAZIDE 37.5-25 MG Take 1 Tab by mouth every day.        .                 Medicines prescribed today were sent to:     [facility]ACY #[id number] - [place] - [address]    [address] [place] [address]    Phone: [phone] Fax: [phone]    Open 24 Hours?: No      Medication refill instructions:       If your prescription bottle indicates you have medication refills left, it is not necessary to call your provider’s office. Please contact your pharmacy  and they will refill your medication.    If your prescription bottle indicates you do not have any refills left, you may request refills at any time through one of the following ways: The online [employer] system (except Urgent Care), by calling your provider’s office, or by asking your pharmacy to contact your provider’s office with a refill request. Medication refills are processed only during regular business hours and may not be available until the next business day. Your provider may request additional information or to have a follow-up visit with you prior to refilling your medication.   *Please Note: Medication refills are assigned a new Rx number when refilled electronically. Your pharmacy may indicate that no refills were authorized even though a new prescription for the same medication is available at the pharmacy. Please request the medicine by name with the pharmacy before contacting your provider for a refill.           Tag[employer] Access Code: [email or web address]  Expires: 5/6/2017 11:58 AM    Tag[employer]is  A secure, online tool to manage your health information     [employer]’s Tag[employer]auris® is a secure, online tool that connects you to your personalized health information from the privacy of your home -- day or night - making it very easy for you to manage your healthcare. Once the activation process is completed, you can even access your medical information using the Tagas[employer]is suresh, which is available for free in the [employer] S[employer]h store or Google Play store.     Tag[employer]is provides the following levels of access (as shown below):   My Chart Features   [facility] Doctor [facility]  Specialists [facility]  Urgent  Care Non-[facility]  Primary Care  Doctor   Email your healthcare team securely and privately 24/7 X X X    Manage appointments: schedule your next appointment; view details of past/upcoming appointments X      Request prescription refills. X      View recent personal medical records, including lab and immunizations X X  X X   View health record, including health history, allergies, medications X X X X   Read reports about your outpatient visits, procedures, consult and ER notes X X X X   See your discharge summary, which is a recap of your hospital and/or ER visit that includes your diagnosis, lab results, and care plan. X X       How to register for BigML:  1. Go to  [email or web address].  2. Click on the Sign Up Now box, which takes you to the New Member Sign Up page. You will need to provide the following information:  a. Enter your BigML Access Code exactly as it appears at the top of this page. (You will not need to use this code after you’ve completed the sign-up process. If you do not sign up before the expiration date, you must request a new code.)   b. Enter your date of birth.   c. Enter your home email address.   d. Click Submit, and follow the next screen’s instructions.  3. Create a BigML ID. This will be your BigML login ID and cannot be changed, so think of one that is secure and easy to remember.  4. Create a BigML password. You can change your password at any time.  5. Enter your Password Reset Question and Answer. This can be used at a later time if you forget your password.   6. Enter your e-mail address. This allows you to receive e-mail notifications when new information is available in BigML.  7. Click Sign Up. You can now view your health information.    For assistance activating your BigML account, call [phone]

## 2017-04-21 NOTE — PROGRESS NOTES
cc:  Follow up    Subjective:     Magdi Pittman is a 76 y.o. female presenting for a follow up of her mood and weight loss.  Recent labs and CT scan were nonspecific, but given her hx of breast cancer, she has an appt with her oncologist next week for a follow up.  She tried the lexapro but developed nausea and sleepiness for 3 days so she stopped it.  She continues to feel somewhat depressed, decreased appetite, low energy, some anhedonia, some concentration difficulties. No suicidal thoughts.        Review of systems:  See above.          Current outpatient prescriptions:   •  sertraline (ZOLOFT) 25 MG tablet, Take 1 Tab by mouth every day., Disp: 90 Tab, Rfl: 0  •  thyroid (ARMOUR THYROID) 60 MG Tab, Take 1 Tab by mouth every day., Disp: 90 Tab, Rfl: 3  •  FLAXSEED, LINSEED, PO, Take  by mouth., Disp: , Rfl:   •  fluticasone-salmeterol (ADVAIR HFA) 115-21 MCG/ACT inhaler, Inhale 1 Puff by mouth 2 times a day., Disp: 3 Inhaler, Rfl: 3  •  PROAIR  (90 BASE) MCG/ACT Aero Soln inhalation aerosol, Inhale 2 Puffs by mouth every 6 hours as needed for Shortness of Breath., Disp: 1 Inhaler, Rfl: 3  •  triamterene-hctz (MAXZIDE-25/DYAZIDE) 37.5-25 MG Tab, Take 1 Tab by mouth every day., Disp: 90 Tab, Rfl: 2  •  Cyanocobalamin (VITAMIN B-12 PO), Take  by mouth every day., Disp: , Rfl:   •  Multiple Vitamin (MULTIVITAMIN PO), Take 1 Tab by mouth every day., Disp: , Rfl:     Allergies   Allergen Reactions   • Asa [Aspirin]      Does not take D/T asthma hx   • Quinine    • Sulfa Drugs Rash     Skin peeling       Past Medical History   Diagnosis Date   • Arthritis    • ASTHMA    • Unspecified disorder of thyroid    • Depression    • H/O varicose vein ligation 3/26/2014   • Lumbar disc disease 3/26/2014   • History of breast cancer 3/26/2014   • Intention tremor 9/28/2016   • Rheumatoid factor positive 3/26/2014     -CCP, Negative hepatitis panel     • Migraines 3/26/2014     Past Surgical History   Procedure Laterality  "Date   • Pr removal synovial cyst,knee  1948     right knee   • Vein ligation  1966     bilateral legs   • Tubal ligation  1973   • Breast biopsy  1974     left breast   • Tonsillectomy  1958   • Basal cell excision  2009     left leg   • Knee arthroscopy Right 2/26/2010     Procedure: WITH DEBRIDEMENT MEDIAL FEMORAL CONDYLE & PATELLA;  Surgeon: Gregorio Sloan M.D.;  Location: SURGERY [facility];  Service:    • Meniscectomy Right 2/26/2010     Procedure: PARTIAL LATERAL  ;  Surgeon: Gregorio Sloan M.D.;  Location: SURGERY [facility];  Service:      Family History   Problem Relation Age of Onset   • Cancer Mother      breast   • Lung Disease Father    • Cancer Sister      breast, pancreatic   • Cancer Brother    • Cancer Maternal Aunt    • Cancer Maternal Grandmother      Social History     Social History   • Marital Status:      Spouse Name: N/A   • Number of Children: N/A   • Years of Education: N/A     Occupational History   • Not on file.     Social History Main Topics   • Smoking status: Never Smoker    • Smokeless tobacco: Never Used   • Alcohol Use: 2.0 oz/week     4 Standard drinks or equivalent per week      Comment: 4 per week   • Drug Use: No   • Sexual Activity: Not Currently     Other Topics Concern   • Not on file     Social History Narrative       Objective:     Vitals: /68 mmHg  Pulse 72  Temp(Src) 37 °C (98.6 °F)  Resp 14  Ht 1.626 m (5' 4\")  Wt 64.864 kg (143 lb)  BMI 24.53 kg/m2  SpO2 92%  General: Alert, pleasant, NAD  HEENT: Normocephalic.    Psych:  Affect/mood is normal, judgement is good, memory is intact, grooming is appropriate.    phq9 is 13 today (was 17 last month)    Assessment/Plan:     Magdi was seen today for hypothyroidism.    Diagnoses and all orders for this visit:    Moderate episode of recurrent major depressive disorder (CMS-HCC)  Will stop the lexapro due to side effects and start sertraline.  Will start with 12.5mg daily and increase to " 25mg in a week if tolerated. Then will increase by 25mg every week until she gets to 100mg daily. F/u in 2 months  -     sertraline (ZOLOFT) 25 MG tablet; Take 1 Tab by mouth every day.        Return in about 2 months (around 6/20/2017) for Med check (long).

## 2017-04-26 ENCOUNTER — HOSPITAL ENCOUNTER (OUTPATIENT)
Dept: HOSPITAL 8 - ROC | Age: 77
Discharge: HOME | End: 2017-04-26
Attending: RADIOLOGY
Payer: MEDICARE

## 2017-04-26 DIAGNOSIS — F32.9: ICD-10-CM

## 2017-04-26 DIAGNOSIS — R91.8: ICD-10-CM

## 2017-04-26 DIAGNOSIS — C50.912: Primary | ICD-10-CM

## 2017-04-26 DIAGNOSIS — Z87.891: ICD-10-CM

## 2017-04-26 DIAGNOSIS — K76.89: ICD-10-CM

## 2017-04-26 DIAGNOSIS — Z17.0: ICD-10-CM

## 2017-04-26 PROCEDURE — G0463 HOSPITAL OUTPT CLINIC VISIT: HCPCS

## 2017-06-16 ENCOUNTER — TELEPHONE (OUTPATIENT)
Dept: MEDICAL GROUP | Facility: MEDICAL CENTER | Age: 77
End: 2017-06-16

## 2017-06-16 NOTE — TELEPHONE ENCOUNTER
Future Appointments       Provider Department Center    6/22/2017 11:20 AM Nicole Holbrook M.D. [facility] [address]          ESTABLISHED PATIENT PRE-VISIT PLANNING     Note: Patient will not be contacted if there is no indication to call. PT was not Contacted.    1.    Reviewed note from last office visit with PCP: YES Last office visit: 04/20/17    2.  If any orders were placed at last visit, do we have Results/Consult Notes?        •  Labs - Labs were not ordered at last office visit.        •  Imaging - Imaging was not ordered at last office visit.        •  Referrals - No referrals were ordered at last office visit.     3.  Immunizations were updated in Epic using WebIZ?: Yes       •  Web Iz Recommendations: HEPATITIS A  TDAP    4.  Patient is due for the following Health Maintenance Topics:   Health Maintenance Due   Topic Date Due   • IMM DTaP/Tdap/Td Vaccine (1 - Tdap) 12/20/1957     5.  Patient was not informed to arrive 15 min prior to their scheduled appointment and bring in their medication bottles.

## 2017-06-22 ENCOUNTER — APPOINTMENT (OUTPATIENT)
Dept: MEDICAL GROUP | Facility: MEDICAL CENTER | Age: 77
End: 2017-06-22
Payer: MEDICARE

## 2017-06-23 ENCOUNTER — OFFICE VISIT (OUTPATIENT)
Dept: MEDICAL GROUP | Facility: MEDICAL CENTER | Age: 77
End: 2017-06-23
Payer: MEDICARE

## 2017-06-23 VITALS
WEIGHT: 144 LBS | RESPIRATION RATE: 16 BRPM | OXYGEN SATURATION: 95 % | BODY MASS INDEX: 24.59 KG/M2 | HEART RATE: 63 BPM | HEIGHT: 64 IN | DIASTOLIC BLOOD PRESSURE: 68 MMHG | SYSTOLIC BLOOD PRESSURE: 118 MMHG

## 2017-06-23 DIAGNOSIS — M21.962 DEFORMITY OF LEFT ANKLE JOINT: ICD-10-CM

## 2017-06-23 DIAGNOSIS — R91.8 PULMONARY NODULES: ICD-10-CM

## 2017-06-23 DIAGNOSIS — K76.9 HEPATIC LESION: ICD-10-CM

## 2017-06-23 DIAGNOSIS — F33.42 RECURRENT MAJOR DEPRESSIVE DISORDER, IN FULL REMISSION (HCC): ICD-10-CM

## 2017-06-23 DIAGNOSIS — Z85.3 HX OF BREAST CANCER: ICD-10-CM

## 2017-06-23 PROBLEM — F33.1 MODERATE EPISODE OF RECURRENT MAJOR DEPRESSIVE DISORDER (HCC): Status: RESOLVED | Noted: 2017-04-20 | Resolved: 2017-06-23

## 2017-06-23 PROCEDURE — 99213 OFFICE O/P EST LOW 20 MIN: CPT | Performed by: FAMILY MEDICINE

## 2017-06-23 NOTE — PROGRESS NOTES
cc:  Follow up    Subjective:     Magdi Pittman is a 76 y.o. female presenting for a follow up of her mood.  She was prescribed sertraline at her last visit but she did not start taking this.  After a couple days of thinking of this med, she decided to try to manage her mood without meds.  She reports feeling better overall today.  Low energy is still an issue but feels like it has improved.  She also reports that she saw her oncologist given the liver lesion seen on the CT scan and her hx of breast cancer.  They decided to just repeat the CT scan in 6 months to follow. She has an appt with the pulmonologist for the lung nodule also seen on the CT scan.    Review of systems:  See above.          Current outpatient prescriptions:   •  thyroid (ARMOUR THYROID) 60 MG Tab, Take 1 Tab by mouth every day., Disp: 90 Tab, Rfl: 3  •  FLAXSEED, LINSEED, PO, Take  by mouth., Disp: , Rfl:   •  fluticasone-salmeterol (ADVAIR HFA) 115-21 MCG/ACT inhaler, Inhale 1 Puff by mouth 2 times a day., Disp: 3 Inhaler, Rfl: 3  •  PROAIR  (90 BASE) MCG/ACT Aero Soln inhalation aerosol, Inhale 2 Puffs by mouth every 6 hours as needed for Shortness of Breath., Disp: 1 Inhaler, Rfl: 3  •  triamterene-hctz (MAXZIDE-25/DYAZIDE) 37.5-25 MG Tab, Take 1 Tab by mouth every day., Disp: 90 Tab, Rfl: 2  •  Cyanocobalamin (VITAMIN B-12 PO), Take  by mouth every day., Disp: , Rfl:   •  Multiple Vitamin (MULTIVITAMIN PO), Take 1 Tab by mouth every day., Disp: , Rfl:     Allergies   Allergen Reactions   • Asa [Aspirin]      Does not take D/T asthma hx   • Quinine    • Sulfa Drugs Rash     Skin peeling       Past Medical History   Diagnosis Date   • Arthritis    • ASTHMA    • Unspecified disorder of thyroid    • Depression    • H/O varicose vein ligation 3/26/2014   • Lumbar disc disease 3/26/2014   • History of breast cancer 3/26/2014   • Intention tremor 9/28/2016   • Rheumatoid factor positive 3/26/2014     -CCP, Negative hepatitis panel     •  "Migraines 3/26/2014     Past Surgical History   Procedure Laterality Date   • Pr removal synovial cyst,knee  1948     right knee   • Vein ligation  1966     bilateral legs   • Tubal ligation  1973   • Breast biopsy  1974     left breast   • Tonsillectomy  1958   • Basal cell excision  2009     left leg   • Knee arthroscopy Right 2/26/2010     Procedure: WITH DEBRIDEMENT MEDIAL FEMORAL CONDYLE & PATELLA;  Surgeon: Gregorio Sloan M.D.;  Location: SURGERY [facility];  Service:    • Meniscectomy Right 2/26/2010     Procedure: PARTIAL LATERAL  ;  Surgeon: Gregorio Sloan M.D.;  Location: SURGERY [facility];  Service:      Family History   Problem Relation Age of Onset   • Cancer Mother      breast   • Lung Disease Father    • Cancer Sister      breast, pancreatic   • Cancer Brother    • Cancer Maternal Aunt    • Cancer Maternal Grandmother      Social History     Social History   • Marital Status:      Spouse Name: N/A   • Number of Children: N/A   • Years of Education: N/A     Occupational History   • Not on file.     Social History Main Topics   • Smoking status: Never Smoker    • Smokeless tobacco: Never Used   • Alcohol Use: 2.0 oz/week     4 Standard drinks or equivalent per week      Comment: 4 per week   • Drug Use: No   • Sexual Activity: Not Currently     Other Topics Concern   • Not on file     Social History Narrative       Objective:     Vitals: /68 mmHg  Pulse 63  Resp 16  Ht 1.626 m (5' 4.02\")  Wt 65.318 kg (144 lb)  BMI 24.71 kg/m2  SpO2 95%  General: Alert, pleasant, NAD  HEENT: Normocephalic.    Psych:  Affect/mood is normal, judgement is good, memory is intact, grooming is appropriate.    Assessment/Plan:     Magdi was seen today for follow-up and results.    Diagnoses and all orders for this visit:    Hepatic lesion  Hx of breast cancer  Pulmonary nodules  Will await recs from pulm regarding the pulmonary nodules, but will order the CT scan to be done 6 months after " the previous scan to follow the lung and liver nodules.  Is otherwise asymptomatic from this. F/u in 3 months after the CT scan to discuss results  -     CT-CHEST,ABDOMEN WITH; Future    Deformity of left ankle joint  Reports that her podiatrist is following this for now    Recurrent major depressive disorder, in full remission (CMS-HCC)  Stable for now. Will follow up in 3 months      Return in about 3 months (around 9/23/2017) for discuss CT scan results.

## 2017-06-23 NOTE — MR AVS SNAPSHOT
"        Magdi Gutierrezyifan   2017 3:40 PM   Office Visit   MRN: [id number]    Department:  [address] [place] [address]   Dept Phone:  [phone]    Description:  Female : 1940   Provider:  Nicole Holbrook M.D.           Reason for Visit     Follow-Up 3 month    Results ct scan      Allergies as of 2017     Allergen Noted Reactions    Asa [Aspirin] 2010       Does not take D/T asthma hx    Quinine 10/27/2014       Sulfa Drugs 2010   Rash    Skin peeling      You were diagnosed with     Pulmonary nodules   [719220]       Hepatic lesion   [701424]         Vital Signs     Blood Pressure Pulse Respirations Height Weight Body Mass Index    118/68 mmHg 63 16 1.626 m (5' 4.02\") 65.318 kg (144 lb) 24.71 kg/m2    Oxygen Saturation Smoking Status                95% Never Smoker           Basic Information     Date Of Birth Sex Race Ethnicity Preferred Language    1940 Female White Non- English      Your appointments     Sep 29, 2017 11:00 AM   Established Patient with Nicole Holbrook M.D.   [facility] [address]    [facility] [address]  [facility] [address] [phone]           You will be receiving a confirmation call a few days before your appointment from our automated call confirmation system.              Problem List              ICD-10-CM Priority Class Noted - Resolved    Osteoarthritis of hands, bilateral M19.041, M19.042   3/26/2014 - Present    Asthma in adult J45.909   3/26/2014 - Present    Hypothyroid E03.9   3/26/2014 - Present    Bunion of great toe M21.619   3/26/2014 - Present    Hx of breast cancer Z85.3   3/21/2017 - Present    Moderate episode of recurrent major depressive disorder (CMS-HCC) F33.1   2017 - Present      Health Maintenance        Date Due Completion Dates    IMM DTaP/Tdap/Td Vaccine (1 - Tdap) 1957 ---            Current Immunizations     13-VALENT PCV PREVNAR 2016 10:25 AM    Influenza TIV (IM) " 10/15/2014    Influenza Vaccine Adult HD 1/25/2017, 1/11/2016 10:26 AM    Pneumococcal polysaccharide vaccine (PPSV-23) 1/25/2017    SHINGLES VACCINE 1/25/2017      Below and/or attached are the medications your provider expects you to take. Review all of your home medications and newly ordered medications with your provider and/or pharmacist. Follow medication instructions as directed by your provider and/or pharmacist. Please keep your medication list with you and share with your provider. Update the information when medications are discontinued, doses are changed, or new medications (including over-the-counter products) are added; and carry medication information at all times in the event of emergency situations     Allergies:  ASA - (reactions not documented)     QUININE - (reactions not documented)     SULFA DRUGS - Rash               Medications  Valid as of: June 23, 2017 -  3:42 PM    Generic Name Brand Name Tablet Size Instructions for use    Albuterol Sulfate (Aero Soln) PROAIR  (90 BASE) MCG/ACT Inhale 2 Puffs by mouth every 6 hours as needed for Shortness of Breath.        Cyanocobalamin   Take  by mouth every day.        Flaxseed (Linseed)   Take  by mouth.        Fluticasone-Salmeterol (Aerosol) ADVAIR -21 MCG/ACT Inhale 1 Puff by mouth 2 times a day.        Multiple Vitamins-Minerals   Take 1 Tab by mouth every day.        Thyroid (Tab) ARMOUR THYROID 60 MG Take 1 Tab by mouth every day.        Triamterene-HCTZ (Tab) MAXZIDE-25/DYAZIDE 37.5-25 MG Take 1 Tab by mouth every day.        .                 Medicines prescribed today were sent to:     [facility]AC[facility] #[id number] - [place] - [address]    [address] [place] [address]    Phone: [phone] Fax: [phone]    Open 24 Hours?: No      Medication refill instructions:       If your prescription bottle indicates you have medication refills left, it is not necessary to call your provider’s office. Please contact your pharmacy  and they will refill your medication.    If your prescription bottle indicates you do not have any refills left, you may request refills at any time through one of the following ways: The online [employer] system (except Urgent Care), by calling your provider’s office, or by asking your pharmacy to contact your provider’s office with a refill request. Medication refills are processed only during regular business hours and may not be available until the next business day. Your provider may request additional information or to have a follow-up visit with you prior to refilling your medication.   *Please Note: Medication refills are assigned a new Rx number when refilled electronically. Your pharmacy may indicate that no refills were authorized even though a new prescription for the same medication is available at the pharmacy. Please request the medicine by name with the pharmacy before contacting your provider for a refill.        Your To Do List     Future Labs/Procedures Complete By Expires    CT-CHEST,ABDOMEN WITH  9/23/2017 (Approximate) 6/23/2018         [employer] Status: Patient Declined

## 2017-09-29 ENCOUNTER — OFFICE VISIT (OUTPATIENT)
Dept: MEDICAL GROUP | Facility: MEDICAL CENTER | Age: 77
End: 2017-09-29
Payer: MEDICARE

## 2017-09-29 VITALS
SYSTOLIC BLOOD PRESSURE: 114 MMHG | HEART RATE: 70 BPM | WEIGHT: 150 LBS | BODY MASS INDEX: 25.61 KG/M2 | OXYGEN SATURATION: 94 % | DIASTOLIC BLOOD PRESSURE: 72 MMHG | TEMPERATURE: 98.6 F | RESPIRATION RATE: 14 BRPM | HEIGHT: 64 IN

## 2017-09-29 DIAGNOSIS — J45.20 ASTHMA IN ADULT, MILD INTERMITTENT, UNCOMPLICATED: ICD-10-CM

## 2017-09-29 DIAGNOSIS — F33.1 MODERATE EPISODE OF RECURRENT MAJOR DEPRESSIVE DISORDER (HCC): ICD-10-CM

## 2017-09-29 DIAGNOSIS — J44.9 CHRONIC OBSTRUCTIVE PULMONARY DISEASE, UNSPECIFIED COPD TYPE (HCC): ICD-10-CM

## 2017-09-29 DIAGNOSIS — Z23 NEED FOR VACCINATION: ICD-10-CM

## 2017-09-29 DIAGNOSIS — R91.8 PULMONARY NODULES: ICD-10-CM

## 2017-09-29 PROBLEM — F33.42 RECURRENT MAJOR DEPRESSIVE DISORDER, IN FULL REMISSION (HCC): Status: RESOLVED | Noted: 2017-06-23 | Resolved: 2017-09-29

## 2017-09-29 PROCEDURE — 99214 OFFICE O/P EST MOD 30 MIN: CPT | Mod: 25 | Performed by: FAMILY MEDICINE

## 2017-09-29 PROCEDURE — 90662 IIV NO PRSV INCREASED AG IM: CPT | Performed by: FAMILY MEDICINE

## 2017-09-29 PROCEDURE — G0008 ADMIN INFLUENZA VIRUS VAC: HCPCS | Performed by: FAMILY MEDICINE

## 2017-09-29 RX ORDER — DULOXETIN HYDROCHLORIDE 20 MG/1
20 CAPSULE, DELAYED RELEASE ORAL DAILY
Qty: 90 CAP | Refills: 0 | Status: SHIPPED | OUTPATIENT
Start: 2017-09-29 | End: 2017-11-02

## 2017-09-29 ASSESSMENT — PATIENT HEALTH QUESTIONNAIRE - PHQ9: CLINICAL INTERPRETATION OF PHQ2 SCORE: 0

## 2017-09-29 NOTE — PROGRESS NOTES
cc:  mood    Subjective:     Magdi Pittman is a 76 y.o. female presenting to discuss her mood.  She continues to report depressed mood, anhedonia, poor concentration, guilt, little energy.  Is also very irritable for no reason.  Denies any suicidal thoughts. She has had depression intermittently in the past, she thought her symptoms would improve over time but they have not. Is willing to start an antidepressant today.  lexapro gave her nausea last time.  Sertraline was prescribed but she never started that.  She does complain of general muscle aches, which contribute to her depression.  Goes to counseling monthly already.  PHQ9 in march was 17, 13 in April, and 17 today.   She also reports that she saw the pulmonologist regarding her pulmonary nodules. Her Advair inhaler was switched to breo and she was also diagnosed with hypoxia overnight, oxygen was recommended. She doesn't quite understand why debris and the oxygen are recommended, is quite confused with the pulmonologist's recommendation, has a follow-up in November. She has stopped the Advair for the past couple months, and she stopped the breo about 2 months ago    Review of systems:  See above.       Current Outpatient Prescriptions:   •  duloxetine (CYMBALTA) 20 MG Cap DR Particles, Take 1 Cap by mouth every day., Disp: 90 Cap, Rfl: 0  •  thyroid (ARMOUR THYROID) 60 MG Tab, Take 1 Tab by mouth every day., Disp: 90 Tab, Rfl: 3  •  FLAXSEED, LINSEED, PO, Take  by mouth., Disp: , Rfl:   •  PROAIR  (90 BASE) MCG/ACT Aero Soln inhalation aerosol, Inhale 2 Puffs by mouth every 6 hours as needed for Shortness of Breath., Disp: 1 Inhaler, Rfl: 3  •  triamterene-hctz (MAXZIDE-25/DYAZIDE) 37.5-25 MG Tab, Take 1 Tab by mouth every day., Disp: 90 Tab, Rfl: 2  •  Cyanocobalamin (VITAMIN B-12 PO), Take  by mouth every day., Disp: , Rfl:   •  Multiple Vitamin (MULTIVITAMIN PO), Take 1 Tab by mouth every day., Disp: , Rfl:   •  fluticasone-salmeterol (ADVAIR HFA)  115-21 MCG/ACT inhaler, Inhale 1 Puff by mouth 2 times a day., Disp: 3 Inhaler, Rfl: 3    Allergies   Allergen Reactions   • Asa [Aspirin]      Does not take D/T asthma hx   • Quinine    • Sulfa Drugs Rash     Skin peeling       Past Medical History:   Diagnosis Date   • Intention tremor 9/28/2016   • H/O varicose vein ligation 3/26/2014   • Lumbar disc disease 3/26/2014   • History of breast cancer 3/26/2014   • Rheumatoid factor positive 3/26/2014    -CCP, Negative hepatitis panel     • Migraines 3/26/2014   • Arthritis    • ASTHMA    • Depression    • Unspecified disorder of thyroid      Past Surgical History:   Procedure Laterality Date   • KNEE ARTHROSCOPY Right 2/26/2010    Procedure: WITH DEBRIDEMENT MEDIAL FEMORAL CONDYLE & PATELLA;  Surgeon: Gregorio Sloan M.D.;  Location: SURGERY [facility];  Service:    • MENISCECTOMY Right 2/26/2010    Procedure: PARTIAL LATERAL  ;  Surgeon: Gregorio Sloan M.D.;  Location: SURGERY [facility];  Service:    • BASAL CELL EXCISION  2009    left leg   • BREAST BIOPSY  1974    left breast   • TUBAL LIGATION  1973   • VEIN LIGATION  1966    bilateral legs   • TONSILLECTOMY  1958   • PB REMOVAL SYNOVIAL CYST,KNEE  1948    right knee     Family History   Problem Relation Age of Onset   • Cancer Mother      breast   • Lung Disease Father    • Cancer Sister      breast, pancreatic   • Cancer Brother    • Cancer Maternal Aunt    • Cancer Maternal Grandmother      Social History     Social History   • Marital status:      Spouse name: N/A   • Number of children: N/A   • Years of education: N/A     Occupational History   • Not on file.     Social History Main Topics   • Smoking status: Never Smoker   • Smokeless tobacco: Never Used   • Alcohol use 2.0 oz/week     4 Standard drinks or equivalent per week      Comment: 4 per week   • Drug use: No   • Sexual activity: Not Currently     Other Topics Concern   • Not on file     Social History Narrative   • No  "narrative on file       Objective:     Vitals: /72   Pulse 70   Temp 37 °C (98.6 °F)   Resp 14   Ht 1.626 m (5' 4\")   Wt 68 kg (150 lb)   SpO2 94%   BMI 25.75 kg/m²   General: Alert, pleasant, NAD  HEENT: Normocephalic.    Psych:  Affect/mood is normal, judgement is good, memory is intact, grooming is appropriate.    Phq9: 17, very difficult    Assessment/Plan:     Diagnoses and all orders for this visit:    Moderate episode of recurrent major depressive disorder (CMS-HCC)  Long discussion regarding management of depression. recommended an antidepressant, patient was willing to start this. We'll start with Cymbalta 20 mg daily as she describes some fibromyalgia like symptoms as well. Encouraged her to continue with the counseling. Follow-up in 4-6 weeks for medication check  -     duloxetine (CYMBALTA) 20 MG Cap DR Particles; Take 1 Cap by mouth every day.    Pulmonary nodules  Asthma in adult, mild intermittent, uncomplicated  Chronic obstructive pulmonary disease, unspecified COPD type (CMS-HCC)  Explained the differences between the inhalers, patient would like to go back on the Advair. She still has advair at home, does not have any more of the breo. Recommended that she just resume the Advair for now until she can discuss with the pulmonologist at her visit in 2 months. Also explained the need for nighttime oxygen. Patient will think about this    Need for vaccination  -     Misc. Devices Misc; One Tdap vaccine and administration  -     INFLUENZA VACCINE, HIGH DOSE (65+ ONLY)        Patient was seen for a total of 25 minutes face-to-face, with more than half of the time spent counseling or coordinating care regarding the above mentioned problems.       Return in about 4 weeks (around 10/27/2017) for Med check.  "

## 2017-11-02 ENCOUNTER — OFFICE VISIT (OUTPATIENT)
Dept: MEDICAL GROUP | Facility: MEDICAL CENTER | Age: 77
End: 2017-11-02
Payer: MEDICARE

## 2017-11-02 VITALS
RESPIRATION RATE: 14 BRPM | HEART RATE: 74 BPM | SYSTOLIC BLOOD PRESSURE: 122 MMHG | BODY MASS INDEX: 24.59 KG/M2 | TEMPERATURE: 98.6 F | WEIGHT: 144 LBS | OXYGEN SATURATION: 94 % | DIASTOLIC BLOOD PRESSURE: 76 MMHG | HEIGHT: 64 IN

## 2017-11-02 DIAGNOSIS — M79.89 LEG SWELLING: ICD-10-CM

## 2017-11-02 DIAGNOSIS — J45.40 MODERATE PERSISTENT ASTHMA IN ADULT WITHOUT COMPLICATION: ICD-10-CM

## 2017-11-02 DIAGNOSIS — M19.041 PRIMARY OSTEOARTHRITIS OF BOTH HANDS: ICD-10-CM

## 2017-11-02 DIAGNOSIS — M19.042 PRIMARY OSTEOARTHRITIS OF BOTH HANDS: ICD-10-CM

## 2017-11-02 DIAGNOSIS — K76.9 HEPATIC LESION: ICD-10-CM

## 2017-11-02 DIAGNOSIS — R91.8 PULMONARY NODULES: ICD-10-CM

## 2017-11-02 DIAGNOSIS — E03.4 HYPOTHYROIDISM DUE TO ACQUIRED ATROPHY OF THYROID: ICD-10-CM

## 2017-11-02 DIAGNOSIS — M21.962 DEFORMITY OF LEFT ANKLE JOINT: ICD-10-CM

## 2017-11-02 DIAGNOSIS — Z00.00 MEDICARE ANNUAL WELLNESS VISIT, SUBSEQUENT: ICD-10-CM

## 2017-11-02 DIAGNOSIS — Z85.3 HX OF BREAST CANCER: ICD-10-CM

## 2017-11-02 DIAGNOSIS — M21.619 BUNION OF GREAT TOE: ICD-10-CM

## 2017-11-02 DIAGNOSIS — F33.1 MODERATE EPISODE OF RECURRENT MAJOR DEPRESSIVE DISORDER (HCC): ICD-10-CM

## 2017-11-02 PROCEDURE — G0439 PPPS, SUBSEQ VISIT: HCPCS | Mod: 25 | Performed by: FAMILY MEDICINE

## 2017-11-02 PROCEDURE — 99999 PR NO CHARGE: CPT | Performed by: FAMILY MEDICINE

## 2017-11-02 RX ORDER — CITALOPRAM 20 MG/1
20 TABLET ORAL DAILY
Qty: 90 TAB | Refills: 0 | Status: SHIPPED | OUTPATIENT
Start: 2017-11-02 | End: 2017-12-14

## 2017-11-02 ASSESSMENT — PATIENT HEALTH QUESTIONNAIRE - PHQ9
SUM OF ALL RESPONSES TO PHQ QUESTIONS 1-9: 12
5. POOR APPETITE OR OVEREATING: 2 - MORE THAN HALF THE DAYS
CLINICAL INTERPRETATION OF PHQ2 SCORE: 4

## 2017-11-02 NOTE — PROGRESS NOTES
Cc: depression    HPI:  Magdi Pittman is a 76 y.o. here for Medicare Annual Wellness Visit and follow-up of her depression and anxiety. She was started on Cymbalta approximately one month ago. She has noticed that her anxiety and overall fibromyalgia pain has improved. However, she has been getting nightmares, poor sleep, poor appetite, foggy brain sensation. She would like to stop the Cymbalta. She has already started tapering down to 20 mg every other day for the past week. Lexapro seemed to help in the past, however when it was resumed recently, she developed nausea. Sertraline was also attempted, but she did not try this. She continues to report depressed mood, anhedonia, poor sleep, poor appetite, guilt. Denies any suicidal thoughts.      Patient Active Problem List    Diagnosis Date Noted   • Moderate episode of recurrent major depressive disorder (CMS-HCC) 09/29/2017   • Pulmonary nodules 06/23/2017   • Hepatic lesion 06/23/2017   • Deformity of left ankle joint 06/23/2017   • Hx of breast cancer 03/21/2017   • Osteoarthritis of hands, bilateral 03/26/2014   • Asthma in adult 03/26/2014   • Hypothyroid 03/26/2014   • Bunion of great toe 03/26/2014       Current Outpatient Prescriptions   Medication Sig Dispense Refill   • citalopram (CELEXA) 20 MG Tab Take 1 Tab by mouth every day. 90 Tab 0   • thyroid (ARMOUR THYROID) 60 MG Tab Take 1 Tab by mouth every day. 90 Tab 3   • FLAXSEED, LINSEED, PO Take  by mouth.     • fluticasone-salmeterol (ADVAIR HFA) 115-21 MCG/ACT inhaler Inhale 1 Puff by mouth 2 times a day. 3 Inhaler 3   • PROAIR  (90 BASE) MCG/ACT Aero Soln inhalation aerosol Inhale 2 Puffs by mouth every 6 hours as needed for Shortness of Breath. 1 Inhaler 3   • triamterene-hctz (MAXZIDE-25/DYAZIDE) 37.5-25 MG Tab Take 1 Tab by mouth every day. 90 Tab 2   • Cyanocobalamin (VITAMIN B-12 PO) Take  by mouth every day.     • Multiple Vitamin (MULTIVITAMIN PO) Take 1 Tab by mouth every day.       No  current facility-administered medications for this visit.             Current supplements as per medication list.       Allergies: Asa [aspirin]; Quinine; and Sulfa drugs    Current social contact/activities: ibeth, lunches with friends      She  reports that she has never smoked. She has never used smokeless tobacco. She reports that she drinks about 2.0 oz of alcohol per week . She reports that she does not use drugs.  Counseling given: Yes    DPA/Advanced Directive:  Patient has Living Will and Durable Power of  on file.     ROS:    Gait: Uses no assistive device   Ostomy: no   Other tubes: no   Amputations: no   Chronic oxygen use: no   Last eye exam: last week   : Reports incontinence with sneezing/coughing.       Screening:    Depression Screening    Little interest or pleasure in doing things?  2 - more than half the days  Feeling down, depressed , or hopeless? 2 - more than half the days  Trouble falling or staying asleep, or sleeping too much?  2 - more than half the days  Feeling tired or having little energy?  2 - more than half the days  Poor appetite or overeating?  2 - more than half the days  Feeling bad about yourself - or that you are a failure or have let yourself or your family down? 2 - more than half the days  Trouble concentrating on things, such as reading the newspaper or watching television? 0 - not at all  Moving or speaking so slowly that other people could have noticed.  Or the opposite - being so fidgety or restless that you have been moving around a lot more than usual?  0 - not at all  Thoughts that you would be better off dead, or of hurting yourself?  0 - not at all  Patient Health Questionnaire Score: 12    If depressive symptoms identified deferred to follow up visit unless specifically addressed in assessment and plan.    Interpretation of PHQ-9 Total Score   Score Severity   1-4 No Depression   5-9 Mild Depression   10-14 Moderate Depression   15-19 Moderately Severe  Depression   20-27 Severe Depression      Screening for Cognitive Impairment    Three Minute Recall (apple, watch, mayda)  3/3    Draw clock face with all 12 numbers set to the hand to show 10 minutes past 11 o'clock  1    Cognitive concerns identified deferred for follow up unless specifically addressed in assessment and plan.    Fall Risk Assessment    Has the patient had two or more falls in the last year or any fall with injury in the last year?  No    Safety Assessment    Throw rugs on floor.  Yes  Handrails on all stairs.  Yes  Good lighting in all hallways.  Yes  Difficulty hearing.  No  Patient counseled about all safety risks that were identified.    Functional Assessment ADLs    Are there any barriers preventing you from cooking for yourself or meeting nutritional needs?  No.    Are there any barriers preventing you from driving safely or obtaining transportation?  No.    Are there any barriers preventing you from using a telephone or calling for help?  No.    Are there any barriers preventing you from shopping?  No.    Are there any barriers preventing you from taking care of your own finances?  No. Tremors. Daughter helps  Are there any barriers preventing you from managing your medications?  No.    Are currently engaging any exercise or physical activity?  Yes.       Health Maintenance Summary                IMM DTaP/Tdap/Td Vaccine Overdue 12/20/1959     Annual Wellness Visit Overdue 9/29/2017      Done 9/28/2016 Visit Dx: Medicare annual wellness visit, initial          Patient Care Team:  Nicole Holbrook M.D. as PCP - General (Family Medicine)  Yi Curiel M.D. (Radiation Therapy)  ROOSEVELT [email or web address] (Podiatry)  Catracho Sauceda M.D. (Pulmonary Medicine)      Social History   Substance Use Topics   • Smoking status: Never Smoker   • Smokeless tobacco: Never Used   • Alcohol use 2.0 oz/week     4 Standard drinks or equivalent per week      Comment: 4 per week     Family History  "  Problem Relation Age of Onset   • Cancer Mother      breast   • Lung Disease Father    • Cancer Sister      breast, pancreatic   • Cancer Brother    • Cancer Maternal Aunt    • Cancer Maternal Grandmother      She  has a past medical history of Arthritis; ASTHMA; Depression; H/O varicose vein ligation (3/26/2014); History of breast cancer (3/26/2014); Intention tremor (9/28/2016); Lumbar disc disease (3/26/2014); Migraines (3/26/2014); Rheumatoid factor positive (3/26/2014); and Unspecified disorder of thyroid.   Past Surgical History:   Procedure Laterality Date   • KNEE ARTHROSCOPY Right 2/26/2010    Procedure: WITH DEBRIDEMENT MEDIAL FEMORAL CONDYLE & PATELLA;  Surgeon: Gregorio Sloan M.D.;  Location: SURGERY [facility];  Service:    • MENISCECTOMY Right 2/26/2010    Procedure: PARTIAL LATERAL  ;  Surgeon: Gregorio Sloan M.D.;  Location: SURGERY [facility];  Service:    • BASAL CELL EXCISION  2009    left leg   • BREAST BIOPSY  1974    left breast   • TUBAL LIGATION  1973   • VEIN LIGATION  1966    bilateral legs   • TONSILLECTOMY  1958   • PB REMOVAL SYNOVIAL CYST,KNEE  1948    right knee       Exam:     Blood pressure 122/76, pulse 74, temperature 37 °C (98.6 °F), resp. rate 14, height 1.626 m (5' 4\"), weight 65.3 kg (144 lb), SpO2 94 %. Body mass index is 24.72 kg/m².    Hearing excellent.    Dentition good  Alert, oriented in no acute distress.  Eye contact is good, speech goal directed, affect calm    Assessment and Plan. The following treatment and monitoring plan is recommended:      Medicare annual wellness visit, subsequent  -     Annual Wellness Visit - Includes PPPS Subsequent ()    Moderate episode of recurrent major depressive disorder (CMS-HCC)  Discussed her medication options. She would like to try something else, we will stop the Cymbalta and start Celexa as she seemed to do well with Lexapro in the past. We'll start at 20 mg daily. Follow-up in 6 weeks  -     Patient has " been identified as being depressed and appropriate orders and counseling have been given  -     citalopram (CELEXA) 20 MG Tab; Take 1 Tab by mouth every day.    Moderate persistent asthma in adult without complication  Stable, followed by pulmonology.  -     Annual Wellness Visit - Includes PPPS Subsequent ()    Pulmonary nodules  Stable, followed by pulmonology.  -     Annual Wellness Visit - Includes PPPS Subsequent ()    Hypothyroidism due to acquired atrophy of thyroid  Stable, last tsh 3/2017  -     Annual Wellness Visit - Includes PPPS Subsequent ()    Hx of breast cancer  Stable, followed by oncology  -     Annual Wellness Visit - Includes PPPS Subsequent ()    Primary osteoarthritis of both hands  stable  -     Annual Wellness Visit - Includes PPPS Subsequent ()    Hepatic lesion  stable  -     Annual Wellness Visit - Includes PPPS Subsequent ()    Deformity of left ankle joint  stable  -     Annual Wellness Visit - Includes PPPS Subsequent ()    Bunion of great toe  stable  -     Annual Wellness Visit - Includes PPPS Subsequent ()    Leg swelling  Stable, intermittent. Continue medication as needed        -     Annual Wellness Visit - Includes PPPS Subsequent ()    Services suggested: No services needed at this time  Health Care Screening: Age-appropriate preventive services Medicare covers discussed today and ordered if indicated.  Referrals offered: Community-based lifestyle interventions to reduce health risks and promote self-management and wellness, fall prevention, nutrition, physical activity, tobacco-use cessation, weight loss, and mental health services as per orders if indicated.    Discussion today about general wellness and lifestyle habits:    · Prevent falls and reduce trip hazards; Cautioned about securing or removing rugs.  · Have a working fire alarm and carbon monoxide detector;   · Engage in regular physical activity and social activities        Follow-up: Return in about 6 weeks (around 12/14/2017) for Med check.

## 2017-12-14 ENCOUNTER — OFFICE VISIT (OUTPATIENT)
Dept: MEDICAL GROUP | Facility: MEDICAL CENTER | Age: 77
End: 2017-12-14
Payer: MEDICARE

## 2017-12-14 VITALS
HEART RATE: 72 BPM | HEIGHT: 64 IN | BODY MASS INDEX: 24.59 KG/M2 | TEMPERATURE: 98.6 F | RESPIRATION RATE: 16 BRPM | OXYGEN SATURATION: 94 % | SYSTOLIC BLOOD PRESSURE: 114 MMHG | WEIGHT: 144 LBS | DIASTOLIC BLOOD PRESSURE: 68 MMHG

## 2017-12-14 DIAGNOSIS — F33.1 MODERATE EPISODE OF RECURRENT MAJOR DEPRESSIVE DISORDER (HCC): ICD-10-CM

## 2017-12-14 DIAGNOSIS — Z12.31 ENCOUNTER FOR SCREENING MAMMOGRAM FOR MALIGNANT NEOPLASM OF BREAST: ICD-10-CM

## 2017-12-14 PROCEDURE — 99213 OFFICE O/P EST LOW 20 MIN: CPT | Performed by: FAMILY MEDICINE

## 2017-12-14 RX ORDER — BUPROPION HYDROCHLORIDE 150 MG/1
150 TABLET ORAL EVERY MORNING
Qty: 90 TAB | Refills: 0 | Status: SHIPPED | OUTPATIENT
Start: 2017-12-14 | End: 2018-02-22

## 2017-12-14 ASSESSMENT — PATIENT HEALTH QUESTIONNAIRE - PHQ9: CLINICAL INTERPRETATION OF PHQ2 SCORE: 0

## 2017-12-14 NOTE — PROGRESS NOTES
cc: Depression and anxiety    Subjective:     Magdi Pittman is a 76 y.o. female presentingFor follow-up of her depression and anxiety. She reports stopping the Celexa as it made her feel quite foggy brain. Thus far, she has tried Cymbalta, Lexapro, Celexa, sertraline. She continues to have poor sleep, nightmares, poor appetite, depressed mood, anhedonia, guilt. Denies any suicidal thoughts. This month has been difficult as her  passed away in December.  Has been feeling more anxious, waking up in the middle of the night    Review of systems:  See above.        Current Outpatient Prescriptions:   •  buPROPion (WELLBUTRIN XL) 150 MG XL tablet, Take 1 Tab by mouth every morning., Disp: 90 Tab, Rfl: 0  •  thyroid (ARMOUR THYROID) 60 MG Tab, Take 1 Tab by mouth every day., Disp: 90 Tab, Rfl: 3  •  FLAXSEED, LINSEED, PO, Take  by mouth., Disp: , Rfl:   •  fluticasone-salmeterol (ADVAIR HFA) 115-21 MCG/ACT inhaler, Inhale 1 Puff by mouth 2 times a day., Disp: 3 Inhaler, Rfl: 3  •  PROAIR  (90 BASE) MCG/ACT Aero Soln inhalation aerosol, Inhale 2 Puffs by mouth every 6 hours as needed for Shortness of Breath., Disp: 1 Inhaler, Rfl: 3  •  triamterene-hctz (MAXZIDE-25/DYAZIDE) 37.5-25 MG Tab, Take 1 Tab by mouth every day., Disp: 90 Tab, Rfl: 2  •  Cyanocobalamin (VITAMIN B-12 PO), Take  by mouth every day., Disp: , Rfl:   •  Multiple Vitamin (MULTIVITAMIN PO), Take 1 Tab by mouth every day., Disp: , Rfl:     Allergies   Allergen Reactions   • Asa [Aspirin]      Does not take D/T asthma hx   • Quinine    • Sulfa Drugs Rash     Skin peeling       Past Medical History:   Diagnosis Date   • Arthritis    • ASTHMA    • Depression    • H/O varicose vein ligation 3/26/2014   • History of breast cancer 3/26/2014   • Intention tremor 9/28/2016   • Lumbar disc disease 3/26/2014   • Migraines 3/26/2014   • Rheumatoid factor positive 3/26/2014    -CCP, Negative hepatitis panel     • Unspecified disorder of thyroid      Past  "Surgical History:   Procedure Laterality Date   • KNEE ARTHROSCOPY Right 2/26/2010    Procedure: WITH DEBRIDEMENT MEDIAL FEMORAL CONDYLE & PATELLA;  Surgeon: Gregorio Sloan M.D.;  Location: SURGERY [facility];  Service:    • MENISCECTOMY Right 2/26/2010    Procedure: PARTIAL LATERAL  ;  Surgeon: Gregorio Sloan M.D.;  Location: SURGERY [facility];  Service:    • BASAL CELL EXCISION  2009    left leg   • BREAST BIOPSY  1974    left breast   • TUBAL LIGATION  1973   • VEIN LIGATION  1966    bilateral legs   • TONSILLECTOMY  1958   • PB REMOVAL SYNOVIAL CYST,KNEE  1948    right knee     Family History   Problem Relation Age of Onset   • Cancer Mother      breast   • Lung Disease Father    • Cancer Sister      breast, pancreatic   • Cancer Brother    • Cancer Maternal Aunt    • Cancer Maternal Grandmother      Social History     Social History   • Marital status:      Spouse name: N/A   • Number of children: N/A   • Years of education: N/A     Occupational History   • Not on file.     Social History Main Topics   • Smoking status: Never Smoker   • Smokeless tobacco: Never Used   • Alcohol use 2.0 oz/week     4 Standard drinks or equivalent per week      Comment: 4 per week   • Drug use: No   • Sexual activity: Not Currently     Other Topics Concern   • Not on file     Social History Narrative   • No narrative on file       Objective:     Vitals: /68   Pulse 72   Temp 37 °C (98.6 °F)   Resp 16   Ht 1.626 m (5' 4\")   Wt 65.3 kg (144 lb)   SpO2 94%   BMI 24.72 kg/m²   General: Alert, pleasant, NAD  HEENT: Normocephalic.    Psych:  Affect/mood is normal, judgement is good, memory is intact, grooming is appropriate.    Assessment/Plan:     Magdi was seen today for hypothyroidism and hypertension.    Diagnoses and all orders for this visit:    Moderate episode of recurrent major depressive disorder (CMS-HCC)  Will try wellbutrin. F/u in 2 months  -     buPROPion (WELLBUTRIN XL) 150 MG XL " tablet; Take 1 Tab by mouth every morning.    Encounter for screening mammogram for malignant neoplasm of breast  -     MA-MAMMO SCREENING BILAT W/ZINA W/CAD; Future      Return in about 2 months (around 2/14/2018) for routine follow up.

## 2018-02-22 ENCOUNTER — OFFICE VISIT (OUTPATIENT)
Dept: MEDICAL GROUP | Facility: MEDICAL CENTER | Age: 78
End: 2018-02-22
Payer: MEDICARE

## 2018-02-22 VITALS
RESPIRATION RATE: 14 BRPM | SYSTOLIC BLOOD PRESSURE: 122 MMHG | HEIGHT: 64 IN | BODY MASS INDEX: 25.78 KG/M2 | TEMPERATURE: 98.6 F | WEIGHT: 151 LBS | OXYGEN SATURATION: 93 % | DIASTOLIC BLOOD PRESSURE: 70 MMHG | HEART RATE: 72 BPM

## 2018-02-22 DIAGNOSIS — E03.4 HYPOTHYROIDISM DUE TO ACQUIRED ATROPHY OF THYROID: ICD-10-CM

## 2018-02-22 DIAGNOSIS — G25.0 BENIGN ESSENTIAL TREMOR: ICD-10-CM

## 2018-02-22 DIAGNOSIS — G89.29 CHRONIC PAIN OF BOTH SHOULDERS: ICD-10-CM

## 2018-02-22 DIAGNOSIS — M25.511 CHRONIC PAIN OF BOTH SHOULDERS: ICD-10-CM

## 2018-02-22 DIAGNOSIS — F33.1 MODERATE EPISODE OF RECURRENT MAJOR DEPRESSIVE DISORDER (HCC): ICD-10-CM

## 2018-02-22 DIAGNOSIS — M25.552 PAIN OF BOTH HIP JOINTS: ICD-10-CM

## 2018-02-22 DIAGNOSIS — M25.551 PAIN OF BOTH HIP JOINTS: ICD-10-CM

## 2018-02-22 DIAGNOSIS — M25.512 CHRONIC PAIN OF BOTH SHOULDERS: ICD-10-CM

## 2018-02-22 PROCEDURE — 99214 OFFICE O/P EST MOD 30 MIN: CPT | Performed by: FAMILY MEDICINE

## 2018-02-22 RX ORDER — PROPRANOLOL HCL 60 MG
60 CAPSULE, EXTENDED RELEASE 24HR ORAL DAILY
Qty: 90 CAP | Refills: 0 | Status: SHIPPED | OUTPATIENT
Start: 2018-02-22 | End: 2018-06-07

## 2018-02-22 RX ORDER — SENNOSIDES 8.6 MG
650 CAPSULE ORAL EVERY 6 HOURS PRN
COMMUNITY
Start: 2018-02-22 | End: 2018-09-06

## 2018-02-22 ASSESSMENT — PATIENT HEALTH QUESTIONNAIRE - PHQ9: CLINICAL INTERPRETATION OF PHQ2 SCORE: 0

## 2018-02-22 NOTE — PROGRESS NOTES
cc: Depression    Subjective:     Magdi Pittman is a 77 y.o. female presenting for a follow-up of her depression and anxiety. She tried taking the Wellbutrin for about 5 weeks, but it seemed to make her symptoms worse. Thus far, shows tried Cymbalta, Lexapro, Celexa, sertraline with no improvement. She continues to have depression, anhedonia, low energy, poor appetite, guilt. Denies any suicidal thoughts. She also worries, worries about different things, cannot relax, is nervous, is easily annoyed, and is afraid something bad will happen. PHQ9 is 11 and GAD7 is 8. She also wonders if her pain issues are causing her depressive symptoms. She has a long-standing history of bilateral shoulder pain due to arthritis. She also notes some hip discomfort, and difficulty standing from a chair sometimes. This has been going on for many years. She also has a slight tremor for the past 6 years, has been getting somewhat worse. The tremor does make her feel quite depressed. She is wondering if we can treat that the tremors to see if her mood improves as well.    Review of systems:  See above.      Current Outpatient Prescriptions:   •  acetaminophen (TYLENOL ARTHRITIS PAIN) 650 MG CR tablet, Take 1 Tab by mouth every 6 hours as needed., Disp: , Rfl:   •  propranolol LA (INDERAL LA) 60 MG CAPSULE SR 24 HR, Take 1 Cap by mouth every day., Disp: 90 Cap, Rfl: 0  •  triamterene-hctz (MAXZIDE-25/DYAZIDE) 37.5-25 MG Tab, Take 1 Tab by mouth every day., Disp: 90 Tab, Rfl: 1  •  thyroid (ARMOUR THYROID) 60 MG Tab, Take 1 Tab by mouth every day., Disp: 90 Tab, Rfl: 3  •  FLAXSEED, LINSEED, PO, Take  by mouth., Disp: , Rfl:   •  fluticasone-salmeterol (ADVAIR HFA) 115-21 MCG/ACT inhaler, Inhale 1 Puff by mouth 2 times a day., Disp: 3 Inhaler, Rfl: 3  •  PROAIR  (90 BASE) MCG/ACT Aero Soln inhalation aerosol, Inhale 2 Puffs by mouth every 6 hours as needed for Shortness of Breath., Disp: 1 Inhaler, Rfl: 3  •  Cyanocobalamin (VITAMIN  B-12 PO), Take  by mouth every day., Disp: , Rfl:   •  Multiple Vitamin (MULTIVITAMIN PO), Take 1 Tab by mouth every day., Disp: , Rfl:     Allergies   Allergen Reactions   • Asa [Aspirin]      Does not take D/T asthma hx   • Quinine    • Sulfa Drugs Rash     Skin peeling       Past Medical History:   Diagnosis Date   • Arthritis    • ASTHMA    • Depression    • H/O varicose vein ligation 3/26/2014   • History of breast cancer 3/26/2014   • Intention tremor 9/28/2016   • Lumbar disc disease 3/26/2014   • Migraines 3/26/2014   • Rheumatoid factor positive 3/26/2014    -CCP, Negative hepatitis panel     • Unspecified disorder of thyroid      Past Surgical History:   Procedure Laterality Date   • KNEE ARTHROSCOPY Right 2/26/2010    Procedure: WITH DEBRIDEMENT MEDIAL FEMORAL CONDYLE & PATELLA;  Surgeon: Gregorio Sloan M.D.;  Location: [facility];  Service:    • MENISCECTOMY Right 2/26/2010    Procedure: PARTIAL LATERAL  ;  Surgeon: Gregorio Sloan M.D.;  Location: SURGERY [facility];  Service:    • BASAL CELL EXCISION  2009    left leg   • BREAST BIOPSY  1974    left breast   • TUBAL LIGATION  1973   • VEIN LIGATION  1966    bilateral legs   • TONSILLECTOMY  1958   • PB REMOVAL SYNOVIAL CYST,KNEE  1948    right knee     Family History   Problem Relation Age of Onset   • Cancer Mother      breast   • Lung Disease Father    • Cancer Sister      breast, pancreatic   • Cancer Brother    • Cancer Maternal Aunt    • Cancer Maternal Grandmother      Social History     Social History   • Marital status:      Spouse name: N/A   • Number of children: N/A   • Years of education: N/A     Occupational History   • Not on file.     Social History Main Topics   • Smoking status: Never Smoker   • Smokeless tobacco: Never Used   • Alcohol use 2.0 oz/week     4 Standard drinks or equivalent per week      Comment: 4 per week   • Drug use: No   • Sexual activity: Not Currently     Other Topics Concern   • Not  "on file     Social History Narrative   • No narrative on file       Objective:     Vitals: /70   Pulse 72   Temp 37 °C (98.6 °F)   Resp 14   Ht 1.626 m (5' 4\")   Wt 68.5 kg (151 lb)   SpO2 93%   BMI 25.92 kg/m²   General: Alert, pleasant, NAD  HEENT: Normocephalic.   Psych:  Affect/mood is normal, judgement is good, memory is intact, grooming is appropriate.    Phq9: 11  Gad7: 8    Assessment/Plan:     Magdi was seen today for follow-up.    Diagnoses and all orders for this visit:    Moderate episode of recurrent major depressive disorder (CMS-HCC)  We discussed trying another medication or referral to psychiatry, patient will like to try the tremor treatment first. Follow up in 2 months    Benign essential tremor  Will try low-dose propranolol once daily  -     CBC WITHOUT DIFFERENTIAL; Future  -     COMP METABOLIC PANEL; Future  -     TSH; Future  -     propranolol LA (INDERAL LA) 60 MG CAPSULE SR 24 HR; Take 1 Cap by mouth every day.    Hypothyroidism due to acquired atrophy of thyroid  Stable, will be due for labs seen in  -     CBC WITHOUT DIFFERENTIAL; Future  -     COMP METABOLIC PANEL; Future  -     TSH; Future    Chronic pain of both shoulders  X-ray of her shoulders one year ago showed osteoarthritis. We'll also check a CRP to rule out PMR  -     CRP HIGH SENSITIVE (CARDIAC); Future    Pain of both hip joints  -     DX-HIP-BILATERAL-WITH PELVIS-2 VIEWS; Future        Return in about 2 months (around 4/22/2018) for routine follow up.  "

## 2018-03-08 ENCOUNTER — HOSPITAL ENCOUNTER (OUTPATIENT)
Dept: LAB | Facility: MEDICAL CENTER | Age: 78
End: 2018-03-08
Attending: FAMILY MEDICINE
Payer: MEDICARE

## 2018-03-08 ENCOUNTER — HOSPITAL ENCOUNTER (OUTPATIENT)
Dept: RADIOLOGY | Facility: MEDICAL CENTER | Age: 78
End: 2018-03-08
Attending: FAMILY MEDICINE
Payer: MEDICARE

## 2018-03-08 DIAGNOSIS — M25.552 PAIN OF BOTH HIP JOINTS: ICD-10-CM

## 2018-03-08 DIAGNOSIS — G89.29 CHRONIC PAIN OF BOTH SHOULDERS: ICD-10-CM

## 2018-03-08 DIAGNOSIS — E03.4 HYPOTHYROIDISM DUE TO ACQUIRED ATROPHY OF THYROID: ICD-10-CM

## 2018-03-08 DIAGNOSIS — M25.511 CHRONIC PAIN OF BOTH SHOULDERS: ICD-10-CM

## 2018-03-08 DIAGNOSIS — M25.512 CHRONIC PAIN OF BOTH SHOULDERS: ICD-10-CM

## 2018-03-08 DIAGNOSIS — M25.551 PAIN OF BOTH HIP JOINTS: ICD-10-CM

## 2018-03-08 DIAGNOSIS — G25.0 BENIGN ESSENTIAL TREMOR: ICD-10-CM

## 2018-03-08 LAB
ALBUMIN SERPL BCP-MCNC: 4.4 G/DL (ref 3.2–4.9)
ALBUMIN/GLOB SERPL: 1.5 G/DL
ALP SERPL-CCNC: 60 U/L (ref 30–99)
ALT SERPL-CCNC: 31 U/L (ref 2–50)
ANION GAP SERPL CALC-SCNC: 9 MMOL/L (ref 0–11.9)
AST SERPL-CCNC: 27 U/L (ref 12–45)
BILIRUB SERPL-MCNC: 0.5 MG/DL (ref 0.1–1.5)
BUN SERPL-MCNC: 17 MG/DL (ref 8–22)
CALCIUM SERPL-MCNC: 10.2 MG/DL (ref 8.5–10.5)
CHLORIDE SERPL-SCNC: 100 MMOL/L (ref 96–112)
CO2 SERPL-SCNC: 29 MMOL/L (ref 20–33)
CREAT SERPL-MCNC: 0.71 MG/DL (ref 0.5–1.4)
CRP SERPL HS-MCNC: 4.1 MG/L (ref 0–7.5)
ERYTHROCYTE [DISTWIDTH] IN BLOOD BY AUTOMATED COUNT: 45.7 FL (ref 35.9–50)
GLOBULIN SER CALC-MCNC: 3 G/DL (ref 1.9–3.5)
GLUCOSE SERPL-MCNC: 79 MG/DL (ref 65–99)
HCT VFR BLD AUTO: 42.1 % (ref 37–47)
HGB BLD-MCNC: 13.9 G/DL (ref 12–16)
MCH RBC QN AUTO: 32.3 PG (ref 27–33)
MCHC RBC AUTO-ENTMCNC: 33 G/DL (ref 33.6–35)
MCV RBC AUTO: 97.7 FL (ref 81.4–97.8)
PLATELET # BLD AUTO: 428 K/UL (ref 164–446)
PMV BLD AUTO: 9.4 FL (ref 9–12.9)
POTASSIUM SERPL-SCNC: 4 MMOL/L (ref 3.6–5.5)
PROT SERPL-MCNC: 7.4 G/DL (ref 6–8.2)
RBC # BLD AUTO: 4.31 M/UL (ref 4.2–5.4)
SODIUM SERPL-SCNC: 138 MMOL/L (ref 135–145)
TSH SERPL DL<=0.005 MIU/L-ACNC: 1.31 UIU/ML (ref 0.38–5.33)
WBC # BLD AUTO: 6.6 K/UL (ref 4.8–10.8)

## 2018-03-08 PROCEDURE — 86141 C-REACTIVE PROTEIN HS: CPT | Mod: GA

## 2018-03-08 PROCEDURE — 73521 X-RAY EXAM HIPS BI 2 VIEWS: CPT

## 2018-03-08 PROCEDURE — 85027 COMPLETE CBC AUTOMATED: CPT

## 2018-03-08 PROCEDURE — 84443 ASSAY THYROID STIM HORMONE: CPT

## 2018-03-08 PROCEDURE — 80053 COMPREHEN METABOLIC PANEL: CPT

## 2018-03-08 PROCEDURE — 36415 COLL VENOUS BLD VENIPUNCTURE: CPT

## 2018-04-03 ENCOUNTER — OFFICE VISIT (OUTPATIENT)
Dept: MEDICAL GROUP | Facility: MEDICAL CENTER | Age: 78
End: 2018-04-03
Payer: MEDICARE

## 2018-04-03 VITALS
DIASTOLIC BLOOD PRESSURE: 64 MMHG | OXYGEN SATURATION: 98 % | HEIGHT: 64 IN | RESPIRATION RATE: 16 BRPM | SYSTOLIC BLOOD PRESSURE: 122 MMHG | TEMPERATURE: 97.5 F | HEART RATE: 69 BPM | BODY MASS INDEX: 25.27 KG/M2 | WEIGHT: 148 LBS

## 2018-04-03 DIAGNOSIS — M79.7 FIBROMYALGIA: ICD-10-CM

## 2018-04-03 PROBLEM — J96.11 CHRONIC RESPIRATORY FAILURE WITH HYPOXIA (HCC): Status: ACTIVE | Noted: 2018-04-03

## 2018-04-03 PROCEDURE — 99214 OFFICE O/P EST MOD 30 MIN: CPT | Performed by: FAMILY MEDICINE

## 2018-04-03 RX ORDER — AMITRIPTYLINE HYDROCHLORIDE 25 MG/1
25 TABLET, FILM COATED ORAL
Qty: 90 TAB | Refills: 0 | Status: SHIPPED | OUTPATIENT
Start: 2018-04-03 | End: 2018-09-06

## 2018-04-03 NOTE — PROGRESS NOTES
cc: Muscle pain    Subjective:     Magdi Pittman is a 77 y.o. female presenting to discuss generalized muscle aches. This has been gradually getting worse over the past 2 years. She feels like her muscles ache all over. Has difficulty walking. Occasionally will wake up at night with some pain. Has been taking Tylenol 650 mg twice a day to 3 times a day, which seems to help somewhat. She does have chronic ankle Pain, but this seems unrelated. Her muscle aches are mostly in her next bilaterally, upper shoulder, forearms, hip area. She recently had labs done an x-ray, which were fairly unremarkable.   She was prescribed propranolol at her last visit for her tremors, she has not started this yet as she was worried about side effects.   She continues to report depression, poor appetite, but has not been losing weight. Has tried Cymbalta, Lexapro, Celexa, sertraline with minimal improvement. She was prescribed Wellbutrin, but has not tried this yet    Review of systems:  See above.  She also notes constant ringing in the years bilaterally for the past month, seems to be stable.    Current Outpatient Prescriptions:   •  amitriptyline (ELAVIL) 25 MG Tab, Take 1 Tab by mouth every bedtime., Disp: 90 Tab, Rfl: 0  •  IMIQUIMOD EX, by Apply externally route., Disp: , Rfl:   •  triamterene-hctz (MAXZIDE-25/DYAZIDE) 37.5-25 MG Tab, Take 1 Tab by mouth every day., Disp: 90 Tab, Rfl: 1  •  thyroid (ARMOUR THYROID) 60 MG Tab, Take 1 Tab by mouth every day., Disp: 90 Tab, Rfl: 3  •  FLAXSEED, LINSEED, PO, Take  by mouth., Disp: , Rfl:   •  fluticasone-salmeterol (ADVAIR HFA) 115-21 MCG/ACT inhaler, Inhale 1 Puff by mouth 2 times a day., Disp: 3 Inhaler, Rfl: 3  •  PROAIR  (90 BASE) MCG/ACT Aero Soln inhalation aerosol, Inhale 2 Puffs by mouth every 6 hours as needed for Shortness of Breath., Disp: 1 Inhaler, Rfl: 3  •  Cyanocobalamin (VITAMIN B-12 PO), Take  by mouth every day., Disp: , Rfl:   •  Multiple Vitamin  (MULTIVITAMIN PO), Take 1 Tab by mouth every day., Disp: , Rfl:   •  acetaminophen (TYLENOL ARTHRITIS PAIN) 650 MG CR tablet, Take 1 Tab by mouth every 6 hours as needed., Disp: , Rfl:   •  propranolol LA (INDERAL LA) 60 MG CAPSULE SR 24 HR, Take 1 Cap by mouth every day., Disp: 90 Cap, Rfl: 0    Allergies   Allergen Reactions   • Asa [Aspirin]      Does not take D/T asthma hx   • Quinine    • Sulfa Drugs Rash     Skin peeling       Past Medical History:   Diagnosis Date   • Arthritis    • ASTHMA    • Depression    • H/O varicose vein ligation 3/26/2014   • History of breast cancer 3/26/2014   • Intention tremor 9/28/2016   • Lumbar disc disease 3/26/2014   • Migraines 3/26/2014   • Rheumatoid factor positive 3/26/2014    -CCP, Negative hepatitis panel     • Unspecified disorder of thyroid      Past Surgical History:   Procedure Laterality Date   • KNEE ARTHROSCOPY Right 2/26/2010    Procedure: WITH DEBRIDEMENT MEDIAL FEMORAL CONDYLE & PATELLA;  Surgeon: Gregorio Sloan M.D.;  Location: SURGERY [facility];  Service:    • MENISCECTOMY Right 2/26/2010    Procedure: PARTIAL LATERAL  ;  Surgeon: Gregorio Sloan M.D.;  Location: SURGERY [facility];  Service:    • BASAL CELL EXCISION  2009    left leg   • BREAST BIOPSY  1974    left breast   • TUBAL LIGATION  1973   • VEIN LIGATION  1966    bilateral legs   • TONSILLECTOMY  1958   • PB REMOVAL SYNOVIAL CYST,KNEE  1948    right knee     Family History   Problem Relation Age of Onset   • Cancer Mother      breast   • Lung Disease Father    • Cancer Sister      breast, pancreatic   • Cancer Brother    • Cancer Maternal Aunt    • Cancer Maternal Grandmother      Social History     Social History   • Marital status:      Spouse name: N/A   • Number of children: N/A   • Years of education: N/A     Occupational History   • Not on file.     Social History Main Topics   • Smoking status: Never Smoker   • Smokeless tobacco: Never Used   • Alcohol use 2.0  "oz/week     4 Standard drinks or equivalent per week      Comment: 4 per week   • Drug use: No   • Sexual activity: Not Currently     Other Topics Concern   • Not on file     Social History Narrative   • No narrative on file       Objective:     Vitals: /64   Pulse 69   Temp 36.4 °C (97.5 °F)   Resp 16   Ht 1.626 m (5' 4\")   Wt 67.1 kg (148 lb)   SpO2 98%   BMI 25.40 kg/m²   General: Alert, pleasant, NAD  HEENT: Normocephalic.    Psych:  Affect/mood is normal, judgement is good, memory is intact, grooming is appropriate.    Assessment/Plan:     Magdi was seen today for otalgia, other and generalized body aches.    Diagnoses and all orders for this visit:    Fibromyalgia  Suspect that she may have fibromyalgia. Discussed treatment with Flexeril or amitriptyline. We'll start with amitriptyline first, if no improvement or if she develops side effects, she will call let us know. Discussed minimizing alcohol intake about potential for dry mouth, dizziness. Follow-up in 2 months  -     amitriptyline (ELAVIL) 25 MG Tab; Take 1 Tab by mouth every bedtime.        Return in about 2 months (around 6/3/2018) for routine follow up.  "

## 2018-06-07 ENCOUNTER — OFFICE VISIT (OUTPATIENT)
Dept: MEDICAL GROUP | Facility: MEDICAL CENTER | Age: 78
End: 2018-06-07
Payer: MEDICARE

## 2018-06-07 VITALS
OXYGEN SATURATION: 94 % | RESPIRATION RATE: 14 BRPM | HEIGHT: 64 IN | SYSTOLIC BLOOD PRESSURE: 118 MMHG | WEIGHT: 149 LBS | BODY MASS INDEX: 25.44 KG/M2 | HEART RATE: 78 BPM | TEMPERATURE: 98.6 F | DIASTOLIC BLOOD PRESSURE: 68 MMHG

## 2018-06-07 DIAGNOSIS — M79.7 FIBROMYALGIA: ICD-10-CM

## 2018-06-07 DIAGNOSIS — F33.1 MODERATE EPISODE OF RECURRENT MAJOR DEPRESSIVE DISORDER (HCC): ICD-10-CM

## 2018-06-07 DIAGNOSIS — G25.0 BENIGN ESSENTIAL TREMOR: ICD-10-CM

## 2018-06-07 DIAGNOSIS — J45.40 MODERATE PERSISTENT ASTHMA IN ADULT WITHOUT COMPLICATION: ICD-10-CM

## 2018-06-07 DIAGNOSIS — J96.11 CHRONIC RESPIRATORY FAILURE WITH HYPOXIA (HCC): ICD-10-CM

## 2018-06-07 DIAGNOSIS — R91.8 PULMONARY NODULES: ICD-10-CM

## 2018-06-07 PROCEDURE — 99214 OFFICE O/P EST MOD 30 MIN: CPT | Performed by: FAMILY MEDICINE

## 2018-06-07 NOTE — PROGRESS NOTES
cc: Fibromyalgia    Subjective:     Magdi Pittman is a 77 y.o. female presenting for follow-up of her fibromyalgia. She started amitriptyline 25 mg about 2 months ago. She notes that her energy level is somewhat improved, her mood also seems to be improving. She has less negative thoughts. She does feel like she sleeps heavier on this medication, but does have some dreams and feels groggy first thing in the morning. She would like to get this medication some more time as she feels overall somewhat improved on this except for the side effects. For her tremors, she has not started the propranolol. She discussed with her pulmonologist to recommended that she stop the albuterol which may be contributing to her tremors. She is now currently using Spiriva only. She has noted improvement in her tremors.    Review of systems:  See above.       Current Outpatient Prescriptions:   •  Tiotropium Bromide Monohydrate (SPIRIVA HANDIHALER INH), Inhale  by mouth., Disp: , Rfl:   •  thyroid (ARMOUR THYROID) 60 MG Tab, Take 1 Tab by mouth every day., Disp: 90 Tab, Rfl: 3  •  amitriptyline (ELAVIL) 25 MG Tab, Take 1 Tab by mouth every bedtime., Disp: 90 Tab, Rfl: 0  •  IMIQUIMOD EX, by Apply externally route., Disp: , Rfl:   •  acetaminophen (TYLENOL ARTHRITIS PAIN) 650 MG CR tablet, Take 1 Tab by mouth every 6 hours as needed., Disp: , Rfl:   •  triamterene-hctz (MAXZIDE-25/DYAZIDE) 37.5-25 MG Tab, Take 1 Tab by mouth every day., Disp: 90 Tab, Rfl: 1  •  FLAXSEED, LINSEED, PO, Take  by mouth., Disp: , Rfl:   •  PROAIR  (90 BASE) MCG/ACT Aero Soln inhalation aerosol, Inhale 2 Puffs by mouth every 6 hours as needed for Shortness of Breath., Disp: 1 Inhaler, Rfl: 3  •  Cyanocobalamin (VITAMIN B-12 PO), Take  by mouth every day., Disp: , Rfl:   •  Multiple Vitamin (MULTIVITAMIN PO), Take 1 Tab by mouth every day., Disp: , Rfl:     Allergies   Allergen Reactions   • Asa [Aspirin]      Does not take D/T asthma hx   • Quinine    •  "Sulfa Drugs Rash     Skin peeling       Past Medical History:   Diagnosis Date   • Arthritis    • ASTHMA    • Depression    • H/O varicose vein ligation 3/26/2014   • History of breast cancer 3/26/2014   • Intention tremor 9/28/2016   • Lumbar disc disease 3/26/2014   • Migraines 3/26/2014   • Rheumatoid factor positive 3/26/2014    -CCP, Negative hepatitis panel     • Unspecified disorder of thyroid      Past Surgical History:   Procedure Laterality Date   • KNEE ARTHROSCOPY Right 2/26/2010    Procedure: WITH DEBRIDEMENT MEDIAL FEMORAL CONDYLE & PATELLA;  Surgeon: Gregorio Sloan M.D.;  Location: SURGERY [facility];  Service:    • MENISCECTOMY Right 2/26/2010    Procedure: PARTIAL LATERAL  ;  Surgeon: Gregorio Sloan M.D.;  Location: SURGERY [facility];  Service:    • BASAL CELL EXCISION  2009    left leg   • BREAST BIOPSY  1974    left breast   • TUBAL LIGATION  1973   • VEIN LIGATION  1966    bilateral legs   • TONSILLECTOMY  1958   • PB REMOVAL SYNOVIAL CYST,KNEE  1948    right knee     Family History   Problem Relation Age of Onset   • Cancer Mother      breast   • Lung Disease Father    • Cancer Sister      breast, pancreatic   • Cancer Brother    • Cancer Maternal Aunt    • Cancer Maternal Grandmother      Social History     Social History   • Marital status:      Spouse name: N/A   • Number of children: N/A   • Years of education: N/A     Occupational History   • Not on file.     Social History Main Topics   • Smoking status: Never Smoker   • Smokeless tobacco: Never Used   • Alcohol use 2.0 oz/week     4 Standard drinks or equivalent per week      Comment: 4 per week   • Drug use: No   • Sexual activity: Not Currently     Other Topics Concern   • Not on file     Social History Narrative   • No narrative on file       Objective:     Vitals: /68   Pulse 78   Temp 37 °C (98.6 °F)   Resp 14   Ht 1.626 m (5' 4\")   Wt 67.6 kg (149 lb)   SpO2 94%   BMI 25.58 kg/m²   General: " Alert, pleasant, NAD  HEENT: Normocephalic.    Psych:  Affect/mood is normal, judgement is good, memory is intact, grooming is appropriate.    Assessment/Plan:     Magdi was seen today for follow-up.    Diagnoses and all orders for this visit:    Fibromyalgia  Seems to be somewhat improved although she does have some side effects from amitriptyline. Discussed trying a half tablet and see if she has less side effects. Her podiatrist also recommended trying gabapentin for her pain, she can try this as well in the future.    Benign essential tremor  Stable, continue to monitor. We'll hold off on propranolol for now.    Moderate persistent asthma in adult without complication  Pulmonary nodules  Chronic respiratory failure with hypoxia (HCC)  Stable, followed by pulmonology. She reports that she had her oxygen tested overnight about 6 months ago, continues to wear her oxygen overnight    Moderate episode of recurrent major depressive disorder (HCC)  Stable, possibly improving      Return in about 3 months (around 9/7/2018) for routine follow up.

## 2018-09-06 ENCOUNTER — OFFICE VISIT (OUTPATIENT)
Dept: MEDICAL GROUP | Facility: MEDICAL CENTER | Age: 78
End: 2018-09-06
Payer: MEDICARE

## 2018-09-06 VITALS
HEART RATE: 78 BPM | TEMPERATURE: 97.4 F | BODY MASS INDEX: 26.12 KG/M2 | SYSTOLIC BLOOD PRESSURE: 120 MMHG | OXYGEN SATURATION: 96 % | RESPIRATION RATE: 16 BRPM | HEIGHT: 64 IN | DIASTOLIC BLOOD PRESSURE: 76 MMHG | WEIGHT: 153 LBS

## 2018-09-06 DIAGNOSIS — M79.7 FIBROMYALGIA: ICD-10-CM

## 2018-09-06 DIAGNOSIS — J44.9 CHRONIC OBSTRUCTIVE PULMONARY DISEASE, UNSPECIFIED COPD TYPE (HCC): ICD-10-CM

## 2018-09-06 DIAGNOSIS — J96.11 CHRONIC RESPIRATORY FAILURE WITH HYPOXIA (HCC): ICD-10-CM

## 2018-09-06 DIAGNOSIS — Z23 NEED FOR VACCINATION: ICD-10-CM

## 2018-09-06 DIAGNOSIS — G25.0 BENIGN ESSENTIAL TREMOR: ICD-10-CM

## 2018-09-06 DIAGNOSIS — J45.40 MODERATE PERSISTENT ASTHMA IN ADULT WITHOUT COMPLICATION: ICD-10-CM

## 2018-09-06 PROCEDURE — 90662 IIV NO PRSV INCREASED AG IM: CPT | Performed by: FAMILY MEDICINE

## 2018-09-06 PROCEDURE — G0008 ADMIN INFLUENZA VIRUS VAC: HCPCS | Performed by: FAMILY MEDICINE

## 2018-09-06 PROCEDURE — 99214 OFFICE O/P EST MOD 30 MIN: CPT | Mod: 25 | Performed by: FAMILY MEDICINE

## 2018-09-06 NOTE — PROGRESS NOTES
cc: Fibromyalgia    Subjective:     Magdi Pittman is a 77 y.o. female presenting for:    1.  Fibromyalgia: She continues to have generalized muscle aches intermittently.  This has been stable.  Has been taking Tylenol 2-3 times a day, which seems to help.  She was started on amitriptyline several months ago which initially seemed to help.  However, she feels like it makes her quite sleepy and stopped it last month.  She feels better off the amitriptyline.  She feels like her mood is stable, still feels somewhat sad some days.  We have tried Wellbutrin, Cymbalta, Celexa, Lexapro, sertraline, and now amitriptyline with no improvement or side effects.  She continues to see her counselor once a month.  She would like to hold off on starting any new medications for this today.    2.  Tremors: She was prescribed propranolol several months ago for her tremors, has not started this yet.  She feels like she is ready to start this.    3.  COPD: She reports having COPD and asthma since childhood.  She was seen pulmonology, but would like to stop going there as she feels like she is not being heard.  She continues with Spiriva.  Denies any shortness of breath, cough, fevers.  She reports that her last lung function test was about a year ago.  She currently uses 2 L of oxygen from a portable concentrator at night, this has been stable.    Review of systems:  See above.  She also had some redness over her left anterior leg 3 months ago, but this has resolved.      Current Outpatient Prescriptions:   •  Misc. Devices Misc, One Tdap vaccine and administration, Disp: 1 Units, Rfl: 0  •  Tiotropium Bromide Monohydrate (SPIRIVA HANDIHALER INH), Inhale  by mouth., Disp: , Rfl:   •  thyroid (ARMOUR THYROID) 60 MG Tab, Take 1 Tab by mouth every day., Disp: 90 Tab, Rfl: 3  •  IMIQUIMOD EX, by Apply externally route., Disp: , Rfl:   •  triamterene-hctz (MAXZIDE-25/DYAZIDE) 37.5-25 MG Tab, Take 1 Tab by mouth every day., Disp: 90 Tab, Rfl:  "1  •  FLAXSEED, LINSEED, PO, Take  by mouth., Disp: , Rfl:   •  Cyanocobalamin (VITAMIN B-12 PO), Take  by mouth every day., Disp: , Rfl:   •  Multiple Vitamin (MULTIVITAMIN PO), Take 1 Tab by mouth every day., Disp: , Rfl:     Allergies, past medical history, past surgical history, family history, social history reviewed and updated    Objective:     Vitals: /76   Pulse 78   Temp 36.3 °C (97.4 °F)   Resp 16   Ht 1.626 m (5' 4\")   Wt 69.4 kg (153 lb)   SpO2 96%   BMI 26.26 kg/m²   General: Alert, pleasant, NAD  HEENT: Normocephalic.  PERRL, EOMI, no icterus or pallor.  Conjunctivae and lids normal. External ears normal.   Heart: Regular rate and rhythm.  S1 and S2 normal.  No murmurs appreciated.  Respiratory: Normal respiratory effort.  Clear to auscultation bilaterally.  Abdomen: Non-distended, soft  Skin: Warm, dry, no rashes.  Musculoskeletal: Gait is normal.  Moves all extremities well.  Extremities: No leg edema.    Psych:  Affect/mood is normal, judgement is good, memory is intact, grooming is appropriate.    Assessment/Plan:     Diagnoses and all orders for this visit:    Fibromyalgia  Stable, recommended continuing with the Tylenol as needed.  We could consider doing a muscle relaxant at a future visit if she is willing to try this.  Follow-up in 3 months    Benign essential tremor  Stable, patient feels like she wants to start the propranolol.    Moderate persistent asthma in adult without complication  Chronic respiratory failure with hypoxia (HCC)  COPD, unspecified COPD type (HCC)  Stable, continue Spiriva and oxygen.  Will consider ordering PFTs at her next visit.    Need for vaccination  -     INFLUENZA VACCINE, HIGH DOSE (65+ ONLY)  -     Misc. Devices Misc; One shingrix vaccine and administration  -     Misc. Devices Misc; One Tdap vaccine and administration          Return in about 3 months (around 12/6/2018) for routine follow up.  "

## 2018-10-11 DIAGNOSIS — E43 EDEMA DUE TO MALNUTRITION, DUE TO UNSPECIFIED MALNUTRITION TYPE (HCC): ICD-10-CM

## 2018-10-11 RX ORDER — TRIAMTERENE AND HYDROCHLOROTHIAZIDE 37.5; 25 MG/1; MG/1
1 TABLET ORAL DAILY
Qty: 90 TAB | Refills: 1 | Status: SHIPPED | OUTPATIENT
Start: 2018-10-11 | End: 2018-12-13

## 2018-10-15 DIAGNOSIS — E43 EDEMA DUE TO MALNUTRITION, DUE TO UNSPECIFIED MALNUTRITION TYPE (HCC): ICD-10-CM

## 2018-10-15 RX ORDER — TRIAMTERENE AND HYDROCHLOROTHIAZIDE 37.5; 25 MG/1; MG/1
1 TABLET ORAL DAILY
Qty: 90 TAB | Refills: 1 | Status: SHIPPED | OUTPATIENT
Start: 2018-10-15 | End: 2019-12-03

## 2018-12-13 ENCOUNTER — OFFICE VISIT (OUTPATIENT)
Dept: MEDICAL GROUP | Facility: MEDICAL CENTER | Age: 78
End: 2018-12-13
Payer: MEDICARE

## 2018-12-13 VITALS
WEIGHT: 153 LBS | OXYGEN SATURATION: 94 % | RESPIRATION RATE: 14 BRPM | HEART RATE: 70 BPM | TEMPERATURE: 98.4 F | SYSTOLIC BLOOD PRESSURE: 114 MMHG | BODY MASS INDEX: 26.12 KG/M2 | HEIGHT: 64 IN | DIASTOLIC BLOOD PRESSURE: 68 MMHG

## 2018-12-13 DIAGNOSIS — M79.89 LEG SWELLING: ICD-10-CM

## 2018-12-13 DIAGNOSIS — M79.7 FIBROMYALGIA: ICD-10-CM

## 2018-12-13 DIAGNOSIS — J44.9 CHRONIC OBSTRUCTIVE PULMONARY DISEASE, UNSPECIFIED COPD TYPE (HCC): ICD-10-CM

## 2018-12-13 DIAGNOSIS — G25.0 BENIGN ESSENTIAL TREMOR: ICD-10-CM

## 2018-12-13 DIAGNOSIS — J45.40 MODERATE PERSISTENT ASTHMA WITHOUT COMPLICATION: ICD-10-CM

## 2018-12-13 DIAGNOSIS — E03.4 HYPOTHYROIDISM DUE TO ACQUIRED ATROPHY OF THYROID: ICD-10-CM

## 2018-12-13 DIAGNOSIS — J96.11 CHRONIC RESPIRATORY FAILURE WITH HYPOXIA (HCC): ICD-10-CM

## 2018-12-13 PROCEDURE — 99214 OFFICE O/P EST MOD 30 MIN: CPT | Performed by: FAMILY MEDICINE

## 2018-12-13 RX ORDER — PROPRANOLOL HYDROCHLORIDE 20 MG/1
20 TABLET ORAL 3 TIMES DAILY
Qty: 90 TAB | Refills: 1 | Status: SHIPPED | OUTPATIENT
Start: 2018-12-13 | End: 2019-03-28

## 2018-12-13 NOTE — PROGRESS NOTES
cc:  hypothyroidism    Subjective:     Magdi Pittman is a 77 y.o. female presenting for:      1. hypothyroidism: Has a history of an underactive thyroid for years.  Has been on [place] Thyroid 60 mg daily. Denies any heat/cold intolerance, diarrhea/constipation, abdominal pain, skin changes, palpitations.  Last TSH was normal 3/2018.    2.  Fibromyalgia: She continues to have generalized muscle aches intermittently.  This has been stable.  Has been taking Tylenol 2-3 times a day, which seems to help.   We have tried Wellbutrin, Cymbalta, Celexa, Lexapro, sertraline, amitriptyline with no improvement or side effects.  She continues to see her counselor once a month.  She would like to hold off on starting any new medications,      3.  Tremors: She was prescribed propranolol several months ago for her tremors, has not started this yet.  She feels like she is ready to start this.  These tremors have been going on for years.  Usually improves with alcohol      4. COPD, pulmonary nodules: She reports having COPD and asthma since childhood.  She sees pulmonology, but would like to stop going there as she feels like she is not being heard.  She continues with Spiriva.  Denies any shortness of breath, cough, fevers.  She reports that her last lung function test was about a year ago.  She currently uses 2 L of oxygen from a portable concentrator at night, this has been stable.  She had questions about her prognosis    5.  Leg swelling: Has intermittent leg swelling, takes Maxide daily.  Denies any leg swelling, chest pain, shortness of breath, lightheadedness, dizziness.       Review of systems:  See above.       Current Outpatient Prescriptions:   •  propranolol (INDERAL) 20 MG Tab, Take 1 Tab by mouth 3 times a day. For tremors, Disp: 90 Tab, Rfl: 1  •  Tiotropium Bromide Monohydrate (SPIRIVA RESPIMAT) 2.5 MCG/ACT Aero Soln, Inhale 2 Puffs by mouth every day., Disp: 12 g, Rfl: 3  •  triamterene-hctz (MAXZIDE-25/DYAZIDE)  "37.5-25 MG Tab, Take 1 Tab by mouth every day., Disp: 90 Tab, Rfl: 1  •  thyroid (ARMOUR THYROID) 60 MG Tab, Take 1 Tab by mouth every day., Disp: 90 Tab, Rfl: 3  •  Cyanocobalamin (VITAMIN B-12 PO), Take  by mouth every day., Disp: , Rfl:   •  Multiple Vitamin (MULTIVITAMIN PO), Take 1 Tab by mouth every day., Disp: , Rfl:     Allergies, past medical history, past surgical history, family history, social history reviewed and updated    Objective:     Vitals: /68 (BP Location: Right arm, Patient Position: Sitting)   Pulse 70   Temp 36.9 °C (98.4 °F) (Temporal)   Resp 14   Ht 1.626 m (5' 4\")   Wt 69.4 kg (153 lb)   SpO2 94%   BMI 26.26 kg/m²   General: Alert, pleasant, NAD  HEENT: Normocephalic.  EOMI, no icterus or pallor.  Conjunctivae and lids normal. External ears normal. Oropharynx non-erythematous, mucous membranes moist.  Neck supple.  No thyromegaly or masses palpated. No cervical or supraclavicular lymphadenopathy.  Heart: Regular rate and rhythm.  S1 and S2 normal.  No murmurs appreciated.  Respiratory: Normal respiratory effort.  Clear to auscultation bilaterally.  Abdomen: Non-distended, soft  Skin: Warm, dry, no rashes.  Musculoskeletal: Gait is normal.  Moves all extremities well.  Extremities: No leg edema.   Psych:  Affect/mood is normal, judgement is good, memory is intact, grooming is appropriate.    Assessment/Plan:     Diagnoses and all orders for this visit:    Hypothyroidism due to acquired atrophy of thyroid  Stable, continue [place] Thyroid.  Will recheck lab before her next visit in 3 months.  -     TSH; Future    Fibromyalgia  Stable, continue to monitor.    Benign essential tremor  Stable, discussed trying propranolol.  She will start with 20 mill grams once a day and titrate up to 3 times a day if tolerated.  Discussed signs and symptoms to watch for, side effects  -     BASIC METABOLIC PANEL; Future  -     propranolol (INDERAL) 20 MG Tab; Take 1 Tab by mouth 3 times a day. For " tremors    Moderate persistent asthma without complication  Chronic obstructive pulmonary disease, unspecified COPD type (HCC)  Chronic respiratory failure with hypoxia (HCC)  Stable, continue inhaler, oxygen at night.  Discussed natural course of COPD, progression of disease, signs and symptoms to watch for.  -     Tiotropium Bromide Monohydrate (SPIRIVA RESPIMAT) 2.5 MCG/ACT Aero Soln; Inhale 2 Puffs by mouth every day.    Leg swelling  Stable, continue maxzide  -     BASIC METABOLIC PANEL; Future      Return in about 3 months (around 3/13/2019) for Med check.

## 2019-02-28 ENCOUNTER — OFFICE VISIT (OUTPATIENT)
Dept: URGENT CARE | Facility: PHYSICIAN GROUP | Age: 79
End: 2019-02-28
Payer: MEDICARE

## 2019-02-28 VITALS
RESPIRATION RATE: 14 BRPM | TEMPERATURE: 98.6 F | SYSTOLIC BLOOD PRESSURE: 126 MMHG | OXYGEN SATURATION: 93 % | DIASTOLIC BLOOD PRESSURE: 72 MMHG | HEART RATE: 74 BPM

## 2019-02-28 DIAGNOSIS — J40 BRONCHITIS: ICD-10-CM

## 2019-02-28 DIAGNOSIS — J45.20 MILD INTERMITTENT ASTHMA WITHOUT COMPLICATION: ICD-10-CM

## 2019-02-28 PROCEDURE — 99213 OFFICE O/P EST LOW 20 MIN: CPT | Performed by: EMERGENCY MEDICINE

## 2019-02-28 RX ORDER — AMOXICILLIN AND CLAVULANATE POTASSIUM 875; 125 MG/1; MG/1
1 TABLET, FILM COATED ORAL 2 TIMES DAILY
Qty: 20 TAB | Refills: 0 | Status: SHIPPED | OUTPATIENT
Start: 2019-02-28 | End: 2019-03-10

## 2019-02-28 ASSESSMENT — ENCOUNTER SYMPTOMS
FEVER: 0
VOMITING: 0
SPUTUM PRODUCTION: 1
ABDOMINAL PAIN: 0
COUGH: 1
CHILLS: 0
NAUSEA: 0

## 2019-02-28 NOTE — PROGRESS NOTES
Subjective:      Magdi Pittman is a 78 y.o. female who presents with Cough (sore throat,x 5 days,green mucus)            HPI  Patient is an 78-year-old female with a cough for the past 5 days productive of green phlegm.  Patient denies any fever, chills, nausea, vomiting, diarrhea.. Pt is amd has been a non smoker. Pt does not have flu symptoms.  Hx of asthma /COPD on Spiriva.  PMH:  has a past medical history of Arthritis; ASTHMA; Depression; H/O varicose vein ligation (3/26/2014); History of breast cancer (3/26/2014); Intention tremor (9/28/2016); Lumbar disc disease (3/26/2014); Migraines (3/26/2014); Rheumatoid factor positive (3/26/2014); and Unspecified disorder of thyroid.  MEDS:   Current Outpatient Prescriptions:   •  Tiotropium Bromide Monohydrate (SPIRIVA RESPIMAT) 2.5 MCG/ACT Aero Soln, Inhale 2 Puffs by mouth every day., Disp: 12 g, Rfl: 3  •  thyroid (ARMOUR THYROID) 60 MG Tab, Take 1 Tab by mouth every day., Disp: 90 Tab, Rfl: 3  •  Cyanocobalamin (VITAMIN B-12 PO), Take  by mouth every day., Disp: , Rfl:   •  Multiple Vitamin (MULTIVITAMIN PO), Take 1 Tab by mouth every day., Disp: , Rfl:   •  propranolol (INDERAL) 20 MG Tab, Take 1 Tab by mouth 3 times a day. For tremors, Disp: 90 Tab, Rfl: 1  •  triamterene-hctz (MAXZIDE-25/DYAZIDE) 37.5-25 MG Tab, Take 1 Tab by mouth every day., Disp: 90 Tab, Rfl: 1  ALLERGIES:   Allergies   Allergen Reactions   • Asa [Aspirin]      Does not take D/T asthma hx   • Quinine    • Sulfa Drugs Rash     Skin peeling     SURGHX:   Past Surgical History:   Procedure Laterality Date   • KNEE ARTHROSCOPY Right 2/26/2010    Procedure: WITH DEBRIDEMENT MEDIAL FEMORAL CONDYLE & PATELLA;  Surgeon: Gregorio Sloan M.D.;  Location: SURGERY [facility];  Service:    • MENISCECTOMY Right 2/26/2010    Procedure: PARTIAL LATERAL  ;  Surgeon: Gregorio Sloan M.D.;  Location: SURGERY [facility];  Service:    • BASAL CELL EXCISION  2009    left leg   • BREAST BIOPSY  1974     left breast   • TUBAL LIGATION  1973   • VEIN LIGATION  1966    bilateral legs   • TONSILLECTOMY  1958   • PB REMOVAL SYNOVIAL CYST,KNEE  1948    right knee     SOCHX:  reports that she has never smoked. She has never used smokeless tobacco. She reports that she drinks about 2.0 oz of alcohol per week . She reports that she does not use drugs.  FH: Reviewed with patient, not pertinent to this visit.   Review of Systems   Constitutional: Negative for chills and fever.   HENT: Positive for congestion.    Eyes: Negative for discharge and redness.   Respiratory: Positive for cough and sputum production.    Cardiovascular: Negative for chest pain and palpitations.   Gastrointestinal: Negative for abdominal pain, diarrhea, nausea and vomiting.   Genitourinary: Negative.    Musculoskeletal: Negative for back pain and neck pain.   Skin: Negative.    Neurological: Negative for sensory change and speech change.   Psychiatric/Behavioral: The patient is not nervous/anxious.           Objective:     /72 (BP Location: Right arm, Patient Position: Sitting, BP Cuff Size: Adult)   Pulse 74   Temp 37 °C (98.6 °F)   Resp 14   SpO2 93%      Physical Exam   Constitutional: She appears well-nourished. No distress.   HENT:   Head: Normocephalic and atraumatic.   Right Ear: External ear normal.   Left Ear: External ear normal.   Mouth/Throat: No oropharyngeal exudate.   Eyes: Conjunctivae are normal. Right eye exhibits no discharge. Left eye exhibits no discharge.   Neck: Normal range of motion.   Cardiovascular: Normal rate.    Pulmonary/Chest: No stridor. She has no wheezes. She has rales.   Rare rale   Abdominal: She exhibits no distension. There is no tenderness.   Musculoskeletal: Normal range of motion.   Neurological: She is alert. Coordination normal.   Skin: Skin is warm and dry. No rash noted. She is not diaphoretic.   Psychiatric: She has a normal mood and affect. Her behavior is normal.   Vitals reviewed.               Assessment/Plan:     Diagnosis: Acute bronchitis                        Asthma / COPD    I am recommending the patient initiate/ continue hydration efforts including the use of a vaporizer/humidifier/ netti pot. I also recommend the pt, initiate Mucinex. In addition the patient will initiate the prescribed prescription medication/s: Augmentin 875/125 PO BID. If the patient's condition exacerbates with worsening dysphagia, shortness of breath, uncontrolled fever, headache or chest pressure he/she will return immediately to the urgent care or go to  the emergency department for further evaluation.    Jason Estrada

## 2019-03-03 ASSESSMENT — ENCOUNTER SYMPTOMS
SPEECH CHANGE: 0
NECK PAIN: 0
NERVOUS/ANXIOUS: 0
PALPITATIONS: 0
BACK PAIN: 0
DIARRHEA: 0
EYE REDNESS: 0
EYE DISCHARGE: 0
SENSORY CHANGE: 0

## 2019-03-19 ENCOUNTER — HOSPITAL ENCOUNTER (OUTPATIENT)
Dept: LAB | Facility: MEDICAL CENTER | Age: 79
End: 2019-03-19
Attending: FAMILY MEDICINE
Payer: MEDICARE

## 2019-03-19 DIAGNOSIS — E03.4 HYPOTHYROIDISM DUE TO ACQUIRED ATROPHY OF THYROID: ICD-10-CM

## 2019-03-19 DIAGNOSIS — G25.0 BENIGN ESSENTIAL TREMOR: ICD-10-CM

## 2019-03-19 DIAGNOSIS — M79.89 LEG SWELLING: ICD-10-CM

## 2019-03-19 LAB
ANION GAP SERPL CALC-SCNC: 10 MMOL/L (ref 0–11.9)
BUN SERPL-MCNC: 22 MG/DL (ref 8–22)
CALCIUM SERPL-MCNC: 9.6 MG/DL (ref 8.5–10.5)
CHLORIDE SERPL-SCNC: 102 MMOL/L (ref 96–112)
CO2 SERPL-SCNC: 27 MMOL/L (ref 20–33)
CREAT SERPL-MCNC: 0.57 MG/DL (ref 0.5–1.4)
GLUCOSE SERPL-MCNC: 107 MG/DL (ref 65–99)
POTASSIUM SERPL-SCNC: 3.6 MMOL/L (ref 3.6–5.5)
SODIUM SERPL-SCNC: 139 MMOL/L (ref 135–145)
TSH SERPL DL<=0.005 MIU/L-ACNC: 0.86 UIU/ML (ref 0.38–5.33)

## 2019-03-19 PROCEDURE — 36415 COLL VENOUS BLD VENIPUNCTURE: CPT

## 2019-03-19 PROCEDURE — 84443 ASSAY THYROID STIM HORMONE: CPT

## 2019-03-19 PROCEDURE — 80048 BASIC METABOLIC PNL TOTAL CA: CPT

## 2019-03-28 ENCOUNTER — OFFICE VISIT (OUTPATIENT)
Dept: MEDICAL GROUP | Facility: MEDICAL CENTER | Age: 79
End: 2019-03-28
Payer: MEDICARE

## 2019-03-28 VITALS
TEMPERATURE: 98.6 F | WEIGHT: 153 LBS | DIASTOLIC BLOOD PRESSURE: 70 MMHG | HEIGHT: 64 IN | OXYGEN SATURATION: 94 % | BODY MASS INDEX: 26.12 KG/M2 | SYSTOLIC BLOOD PRESSURE: 122 MMHG | HEART RATE: 74 BPM | RESPIRATION RATE: 16 BRPM

## 2019-03-28 DIAGNOSIS — M79.89 LEG SWELLING: ICD-10-CM

## 2019-03-28 DIAGNOSIS — J96.11 CHRONIC RESPIRATORY FAILURE WITH HYPOXIA (HCC): ICD-10-CM

## 2019-03-28 DIAGNOSIS — M79.7 FIBROMYALGIA: ICD-10-CM

## 2019-03-28 DIAGNOSIS — J44.9 CHRONIC OBSTRUCTIVE PULMONARY DISEASE, UNSPECIFIED COPD TYPE (HCC): ICD-10-CM

## 2019-03-28 DIAGNOSIS — E03.4 HYPOTHYROIDISM DUE TO ACQUIRED ATROPHY OF THYROID: ICD-10-CM

## 2019-03-28 DIAGNOSIS — J45.40 MODERATE PERSISTENT ASTHMA WITHOUT COMPLICATION: ICD-10-CM

## 2019-03-28 DIAGNOSIS — G25.0 BENIGN ESSENTIAL TREMOR: ICD-10-CM

## 2019-03-28 DIAGNOSIS — F33.1 MODERATE EPISODE OF RECURRENT MAJOR DEPRESSIVE DISORDER (HCC): ICD-10-CM

## 2019-03-28 DIAGNOSIS — R32 URINARY INCONTINENCE, UNSPECIFIED TYPE: ICD-10-CM

## 2019-03-28 PROCEDURE — 99214 OFFICE O/P EST MOD 30 MIN: CPT | Performed by: FAMILY MEDICINE

## 2019-03-28 RX ORDER — ALBUTEROL SULFATE 90 UG/1
1-2 AEROSOL, METERED RESPIRATORY (INHALATION) EVERY 6 HOURS PRN
Qty: 1 INHALER | Refills: 3 | Status: SHIPPED | OUTPATIENT
Start: 2019-03-28 | End: 2020-05-28 | Stop reason: SDUPTHER

## 2019-03-28 NOTE — PROGRESS NOTES
cc: Hypothyroidism    Subjective:     Magdi Pittman is a 78 y.o. female presenting for:    1. hypothyroidism: Has a history of an underactive thyroid for years.  Has been on [place] Thyroid 60 mg daily. Denies any heat/cold intolerance, diarrhea/constipation, abdominal pain, skin changes, palpitations.    Tremors have been stable.  Last TSH was normal 3/2019     2.  Fibromyalgia: She continues to have generalized muscle aches intermittently.  This has been stable, is not interested in trying other medications.  Has been taking Tylenol 2-3 times a day, which seems to help.   We have tried Wellbutrin, Cymbalta, Celexa, Lexapro, sertraline, amitriptyline with no improvement or side effects.  She continues to see her counselor once a month.     3.  Tremors: She tried propranolol for several weeks, but had significant fatigue.  She would prefer to avoid medications and monitor for now.  They do improve with alcohol intake, although she only drinks rarely     4. COPD, pulmonary nodules: She reports having COPD and asthma since childhood.  She has not followed up with pulmonology yet.  She continues with Spiriva, albuterol as needed.  Denies any shortness of breath, cough, fevers.  She reports that her last lung function test was about a year ago.  She currently uses 2 L of oxygen from a portable concentrator at night, this has been stable.      5.  Leg swelling: Has intermittent leg swelling, takes Maxide daily.  Denies any leg swelling, chest pain, shortness of breath, lightheadedness, dizziness.    6.  Depression: Mood has been stable.  She declines to take a medication for her depression.  She has been sleeping better.    7.  Incontinence: She does have incontinence when she laughs or coughs.  Has now developed some leakage in the middle the night, is now wearing adult diapers.  She occasionally notices a stronger urine odor, but is otherwise asymptomatic.  Denies any burning with urination, frequency, urgency, blood  "in the urine      Review of systems:  See above.       Current Outpatient Prescriptions:   •  albuterol (PROAIR HFA) 108 (90 Base) MCG/ACT Aero Soln inhalation aerosol, Inhale 1-2 Puffs by mouth every 6 hours as needed for Shortness of Breath., Disp: 1 Inhaler, Rfl: 3  •  Acetaminophen (TYLENOL ARTHRITIS PAIN PO), Take 650 mg by mouth 3 times a day., Disp: , Rfl:   •  Tiotropium Bromide Monohydrate (SPIRIVA RESPIMAT) 2.5 MCG/ACT Aero Soln, Inhale 2 Puffs by mouth every day., Disp: 12 g, Rfl: 3  •  triamterene-hctz (MAXZIDE-25/DYAZIDE) 37.5-25 MG Tab, Take 1 Tab by mouth every day., Disp: 90 Tab, Rfl: 1  •  thyroid (ARMOUR THYROID) 60 MG Tab, Take 1 Tab by mouth every day., Disp: 90 Tab, Rfl: 3  •  Cyanocobalamin (VITAMIN B-12 PO), Take  by mouth every day., Disp: , Rfl:   •  Multiple Vitamin (MULTIVITAMIN PO), Take 1 Tab by mouth every day., Disp: , Rfl:     Allergies, past medical history, past surgical history, family history, social history reviewed and updated    Objective:     Vitals: /70 (BP Location: Right arm, Patient Position: Sitting)   Pulse 74   Temp 37 °C (98.6 °F) (Temporal)   Resp 16   Ht 1.626 m (5' 4\")   Wt 69.4 kg (153 lb)   SpO2 94%   BMI 26.26 kg/m²   General: Alert, pleasant, NAD  HEENT: Normocephalic.    Psych:  Affect/mood is normal, judgement is good, memory is intact, grooming is appropriate.    Assessment/Plan:     Diagnoses and all orders for this visit:    Hypothyroidism due to acquired atrophy of thyroid  Stable, continue [place] Thyroid.    Fibromyalgia  Stable, continue to monitor.  Could consider trying Flexeril, but at a low-dose given her age.    Benign essential tremor  Stable, continue to monitor.    Chronic obstructive pulmonary disease, unspecified COPD type (HCC)  Moderate persistent asthma without complication  Chronic respiratory failure with hypoxia (HCC)  Stable, continue current inhalers, oxygen  -     albuterol (PROAIR HFA) 108 (90 Base) MCG/ACT Aero Soln " inhalation aerosol; Inhale 1-2 Puffs by mouth every 6 hours as needed for Shortness of Breath.    Moderate episode of recurrent major depressive disorder (HCC)  Stable, continue to monitor.  Patient declines trying another medication.  If she does change her mind, will place a referral to psychiatry.    Leg swelling  Stable, continue Maxide    Urinary incontinence, unspecified type  Stable, continue to monitor.  Discussed hygiene, toileting, when to take her diuretic.      Return in about 3 months (around 6/28/2019) for routine follow up.

## 2019-04-04 RX ORDER — THYROID 60 MG/1
60 TABLET ORAL DAILY
Qty: 90 TAB | Refills: 3 | Status: SHIPPED | OUTPATIENT
Start: 2019-04-04 | End: 2020-04-15

## 2019-04-09 ENCOUNTER — TELEPHONE (OUTPATIENT)
Dept: MEDICAL GROUP | Facility: MEDICAL CENTER | Age: 79
End: 2019-04-09

## 2019-04-09 DIAGNOSIS — J44.9 CHRONIC OBSTRUCTIVE PULMONARY DISEASE, UNSPECIFIED COPD TYPE (HCC): ICD-10-CM

## 2019-04-09 DIAGNOSIS — J96.11 CHRONIC RESPIRATORY FAILURE WITH HYPOXIA (HCC): ICD-10-CM

## 2019-04-09 NOTE — TELEPHONE ENCOUNTER
1. Caller Name: Magdi                                         Call Back Number: [phone] (home) [phone] (work)        Patient approves a detailed voicemail message: yes    Patient called wanting to let us know she will be using [place] for her oxygen can you please print out an order

## 2019-04-30 ENCOUNTER — OFFICE VISIT (OUTPATIENT)
Dept: MEDICAL GROUP | Facility: PHYSICIAN GROUP | Age: 79
End: 2019-04-30
Payer: MEDICARE

## 2019-04-30 VITALS
OXYGEN SATURATION: 91 % | RESPIRATION RATE: 16 BRPM | HEART RATE: 78 BPM | HEIGHT: 64 IN | SYSTOLIC BLOOD PRESSURE: 132 MMHG | WEIGHT: 149 LBS | BODY MASS INDEX: 25.44 KG/M2 | TEMPERATURE: 98.6 F | DIASTOLIC BLOOD PRESSURE: 74 MMHG

## 2019-04-30 DIAGNOSIS — R05.9 COUGH: ICD-10-CM

## 2019-04-30 PROCEDURE — 99214 OFFICE O/P EST MOD 30 MIN: CPT | Performed by: INTERNAL MEDICINE

## 2019-04-30 RX ORDER — AZITHROMYCIN 250 MG/1
TABLET, FILM COATED ORAL
Qty: 6 TAB | Refills: 0 | Status: SHIPPED | OUTPATIENT
Start: 2019-04-30 | End: 2019-05-13

## 2019-04-30 RX ORDER — BENZONATATE 100 MG/1
100 CAPSULE ORAL 3 TIMES DAILY PRN
Qty: 60 CAP | Refills: 0 | Status: SHIPPED | OUTPATIENT
Start: 2019-04-30 | End: 2019-08-01

## 2019-04-30 RX ORDER — PREDNISONE 20 MG/1
TABLET ORAL
Qty: 5 TAB | Refills: 0 | Status: SHIPPED | OUTPATIENT
Start: 2019-04-30 | End: 2019-05-13

## 2019-04-30 NOTE — PROGRESS NOTES
PRIMARY CARE CLINIC ACUTE VISIT  Chief Complaint   Patient presents with   • Nasal Congestion     x 2 weeks    • Cough     History of Present Illness     Cough  Has been having a cough since 4/17 or 4/18. Has a history of COPD with 2L nocturnal oxygen dependency. Hadn't used a rescue inhaler for 12 years until her current bout started. Cough is productive of gray-brown, yellow-green, white, and clear sputum. Nasal discharge is blood streaked. Has dyspnea on exertion. Also feels urinary urgency. Reports chills and a subjective fever last week.     Current Outpatient Prescriptions   Medication Sig Dispense Refill   • azithromycin (ZITHROMAX) 250 MG Tab Please take 2 tabs on the first day and then 1 tab daily for 4 days 6 Tab 0   • predniSONE (DELTASONE) 20 MG Tab Take 1 tablet daily for 5 days 5 Tab 0   • benzonatate (TESSALON) 100 MG Cap Take 1 Cap by mouth 3 times a day as needed for Cough. 60 Cap 0   • Tiotropium Bromide Monohydrate (SPIRIVA RESPIMAT) 2.5 MCG/ACT Aero Soln Inhale 2 Puffs by mouth every day. 12 g 3   • Misc. Devices Misc 2L oxygen via nasal cannula at night while sleeping 1 Units 0   • thyroid (ARMOUR THYROID) 60 MG Tab Take 1 Tab by mouth every day. 90 Tab 3   • albuterol (PROAIR HFA) 108 (90 Base) MCG/ACT Aero Soln inhalation aerosol Inhale 1-2 Puffs by mouth every 6 hours as needed for Shortness of Breath. 1 Inhaler 3   • Acetaminophen (TYLENOL ARTHRITIS PAIN PO) Take 650 mg by mouth 3 times a day.     • triamterene-hctz (MAXZIDE-25/DYAZIDE) 37.5-25 MG Tab Take 1 Tab by mouth every day. 90 Tab 1   • Cyanocobalamin (VITAMIN B-12 PO) Take  by mouth every day.     • Multiple Vitamin (MULTIVITAMIN PO) Take 1 Tab by mouth every day.       No current facility-administered medications for this visit.      Past Medical History:   Diagnosis Date   • Arthritis    • ASTHMA    • Depression    • H/O varicose vein ligation 3/26/2014   • History of breast cancer 3/26/2014   • Intention tremor 9/28/2016   •  "Lumbar disc disease 3/26/2014   • Migraines 3/26/2014   • Rheumatoid factor positive 3/26/2014    -CCP, Negative hepatitis panel     • Unspecified disorder of thyroid      Past Surgical History:   Procedure Laterality Date   • KNEE ARTHROSCOPY Right 2010    Procedure: WITH DEBRIDEMENT MEDIAL FEMORAL CONDYLE & PATELLA;  Surgeon: Gregorio Sloan M.D.;  Location: SURGERY [facility];  Service:    • MENISCECTOMY Right 2010    Procedure: PARTIAL LATERAL  ;  Surgeon: Gregorio Sloan M.D.;  Location: SURGERY [facility];  Service:    • BASAL CELL EXCISION      left leg   • BREAST BIOPSY      left breast   • TUBAL LIGATION     • VEIN LIGATION      bilateral legs   • TONSILLECTOMY     • PB REMOVAL SYNOVIAL CYST,KNEE      right knee     Social History   Substance Use Topics   • Smoking status: Never Smoker   • Smokeless tobacco: Never Used   • Alcohol use 2.0 oz/week     4 Standard drinks or equivalent per week      Comment: 4 per week     Social History     Social History Narrative   • No narrative on file     Family History   Problem Relation Age of Onset   • Cancer Mother         breast   • Lung Disease Father    • Cancer Sister         breast, pancreatic   • Cancer Brother    • Cancer Maternal Aunt    • Cancer Maternal Grandmother      Family Status   Relation Status   • Mo    • Fa    • Sis    • Bro    • Son    • Tino Alive   • GChild Alive   • OTHER Alive   • MAunt (Not Specified)   • MGMo (Not Specified)     Allergies: Asa [aspirin]; Quinine; and Sulfa drugs    ROS  As per HPI above. All other systems reviewed and negative.        Objective   /74   Pulse 78   Temp 37 °C (98.6 °F)   Resp 16   Ht 1.626 m (5' 4\")   Wt 67.6 kg (149 lb)   SpO2 91%  Body mass index is 25.58 kg/m².    General: alert and oriented, pleasant, cooperative  HEENT: Normocephalic, atraumatic. No thyroid masses. Oropharynx clear without exudate or " injection.   Cardiovascular: regular rate and rhythm, normal S1/S2  Pulmonary: lungs clear to auscultation bilaterally  Skin: warm and dry, no lesions or rashes  Psychiatric: appropriate mood and affect. Good insight and appropriate judgment       Assessment and Plan   The following treatment plan was discussed     1. Cough  Treat for COPD exacerbation with a course of azithromycin and prednisone. Also tessalon perles and mucinex prn, which were discussed with patient. Advised having a bubbler set up through her oxygen company for humidified air and also using a saline nasal spray to help keep her nasal mucous membranes moist.   - azithromycin (ZITHROMAX) 250 MG Tab; Please take 2 tabs on the first day and then 1 tab daily for 4 days  Dispense: 6 Tab; Refill: 0  - predniSONE (DELTASONE) 20 MG Tab; Take 1 tablet daily for 5 days  Dispense: 5 Tab; Refill: 0  - benzonatate (TESSALON) 100 MG Cap; Take 1 Cap by mouth 3 times a day as needed for Cough.  Dispense: 60 Cap; Refill: 0    Healthcare maintenance     Health Maintenance Due   Topic Date Due   • PFT SCREENING-FEV1 AND FEV/FVC RATIO / SPIROMETRY SHOULD BE PERFORMED ANNUALLY  12/20/1958   • Annual Wellness Visit  11/03/2018       Return if symptoms worsen or fail to improve.    Mckay Gonzalez MD  Internal Medicine  [facility]

## 2019-04-30 NOTE — ASSESSMENT & PLAN NOTE
Has been having a cough since 4/17 or 4/18. Has a history of COPD with 2L nocturnal oxygen dependency. Hadn't used a rescue inhaler for 12 years until her current bout started. Cough is productive of gray-brown, yellow-green, white, and clear sputum. Nasal discharge is blood streaked. Has dyspnea on exertion. Also feels urinary urgency. Reports chills and a subjective fever last week.

## 2019-05-13 ENCOUNTER — OFFICE VISIT (OUTPATIENT)
Dept: MEDICAL GROUP | Facility: MEDICAL CENTER | Age: 79
End: 2019-05-13
Payer: MEDICARE

## 2019-05-13 ENCOUNTER — HOSPITAL ENCOUNTER (OUTPATIENT)
Facility: MEDICAL CENTER | Age: 79
End: 2019-05-13
Attending: FAMILY MEDICINE
Payer: MEDICARE

## 2019-05-13 VITALS
WEIGHT: 148 LBS | HEIGHT: 64 IN | HEART RATE: 74 BPM | TEMPERATURE: 98.6 F | SYSTOLIC BLOOD PRESSURE: 124 MMHG | DIASTOLIC BLOOD PRESSURE: 76 MMHG | OXYGEN SATURATION: 94 % | BODY MASS INDEX: 25.27 KG/M2 | RESPIRATION RATE: 16 BRPM

## 2019-05-13 DIAGNOSIS — Z85.3 HX OF BREAST CANCER: ICD-10-CM

## 2019-05-13 DIAGNOSIS — J44.9 CHRONIC OBSTRUCTIVE PULMONARY DISEASE, UNSPECIFIED COPD TYPE (HCC): ICD-10-CM

## 2019-05-13 DIAGNOSIS — R30.0 DYSURIA: ICD-10-CM

## 2019-05-13 DIAGNOSIS — F33.1 MODERATE EPISODE OF RECURRENT MAJOR DEPRESSIVE DISORDER (HCC): ICD-10-CM

## 2019-05-13 DIAGNOSIS — N30.01 ACUTE CYSTITIS WITH HEMATURIA: ICD-10-CM

## 2019-05-13 DIAGNOSIS — M79.89 LEG SWELLING: ICD-10-CM

## 2019-05-13 DIAGNOSIS — E03.4 HYPOTHYROIDISM DUE TO ACQUIRED ATROPHY OF THYROID: ICD-10-CM

## 2019-05-13 DIAGNOSIS — Z00.00 MEDICARE ANNUAL WELLNESS VISIT, SUBSEQUENT: ICD-10-CM

## 2019-05-13 DIAGNOSIS — M79.7 FIBROMYALGIA: ICD-10-CM

## 2019-05-13 DIAGNOSIS — J96.11 CHRONIC RESPIRATORY FAILURE WITH HYPOXIA (HCC): ICD-10-CM

## 2019-05-13 DIAGNOSIS — R06.02 SHORTNESS OF BREATH: ICD-10-CM

## 2019-05-13 DIAGNOSIS — J45.40 MODERATE PERSISTENT ASTHMA WITHOUT COMPLICATION: ICD-10-CM

## 2019-05-13 PROBLEM — R05.9 COUGH: Status: RESOLVED | Noted: 2019-04-30 | Resolved: 2019-05-13

## 2019-05-13 LAB
APPEARANCE UR: NORMAL
BILIRUB UR STRIP-MCNC: NORMAL MG/DL
COLOR UR AUTO: NORMAL
GLUCOSE UR STRIP.AUTO-MCNC: NORMAL MG/DL
KETONES UR STRIP.AUTO-MCNC: NORMAL MG/DL
LEUKOCYTE ESTERASE UR QL STRIP.AUTO: NORMAL
NITRITE UR QL STRIP.AUTO: NORMAL
PH UR STRIP.AUTO: 5 [PH] (ref 5–8)
PROT UR QL STRIP: 30 MG/DL
RBC UR QL AUTO: NORMAL
SP GR UR STRIP.AUTO: 1.02
UROBILINOGEN UR STRIP-MCNC: 0.2 MG/DL

## 2019-05-13 PROCEDURE — 87186 SC STD MICRODIL/AGAR DIL: CPT

## 2019-05-13 PROCEDURE — G0439 PPPS, SUBSEQ VISIT: HCPCS | Performed by: FAMILY MEDICINE

## 2019-05-13 PROCEDURE — 81002 URINALYSIS NONAUTO W/O SCOPE: CPT | Performed by: FAMILY MEDICINE

## 2019-05-13 PROCEDURE — 87077 CULTURE AEROBIC IDENTIFY: CPT

## 2019-05-13 PROCEDURE — 87086 URINE CULTURE/COLONY COUNT: CPT

## 2019-05-13 PROCEDURE — 99213 OFFICE O/P EST LOW 20 MIN: CPT | Mod: 25 | Performed by: FAMILY MEDICINE

## 2019-05-13 RX ORDER — PREDNISONE 10 MG/1
TABLET ORAL
Qty: 32 TAB | Refills: 0 | Status: SHIPPED | OUTPATIENT
Start: 2019-05-13 | End: 2019-06-14

## 2019-05-13 RX ORDER — CEPHALEXIN 500 MG/1
500 CAPSULE ORAL 3 TIMES DAILY
Qty: 15 CAP | Refills: 0 | Status: SHIPPED | OUTPATIENT
Start: 2019-05-13 | End: 2019-05-18

## 2019-05-13 ASSESSMENT — PATIENT HEALTH QUESTIONNAIRE - PHQ9
SUM OF ALL RESPONSES TO PHQ9 QUESTIONS 1 AND 2: 2
3. TROUBLE FALLING OR STAYING ASLEEP OR SLEEPING TOO MUCH: NOT AT ALL
1. LITTLE INTEREST OR PLEASURE IN DOING THINGS: NOT AT ALL
9. THOUGHTS THAT YOU WOULD BE BETTER OFF DEAD, OR OF HURTING YOURSELF: NOT AT ALL
SUM OF ALL RESPONSES TO PHQ QUESTIONS 1-9: 13
SUM OF ALL RESPONSES TO PHQ QUESTIONS 1-9: 9
7. TROUBLE CONCENTRATING ON THINGS, SUCH AS READING THE NEWSPAPER OR WATCHING TELEVISION: SEVERAL DAYS
4. FEELING TIRED OR HAVING LITTLE ENERGY: MORE THAN HALF THE DAYS
5. POOR APPETITE OR OVEREATING: NEARLY EVERY DAY
2. FEELING DOWN, DEPRESSED, IRRITABLE, OR HOPELESS: MORE THAN HALF THE DAYS
5. POOR APPETITE OR OVEREATING: 3 - NEARLY EVERY DAY
8. MOVING OR SPEAKING SO SLOWLY THAT OTHER PEOPLE COULD HAVE NOTICED. OR THE OPPOSITE, BEING SO FIGETY OR RESTLESS THAT YOU HAVE BEEN MOVING AROUND A LOT MORE THAN USUAL: SEVERAL DAYS
6. FEELING BAD ABOUT YOURSELF - OR THAT YOU ARE A FAILURE OR HAVE LET YOURSELF OR YOUR FAMILY DOWN: NOT AL ALL
CLINICAL INTERPRETATION OF PHQ2 SCORE: 4

## 2019-05-13 ASSESSMENT — ENCOUNTER SYMPTOMS: GENERAL WELL-BEING: FAIR

## 2019-05-13 ASSESSMENT — ACTIVITIES OF DAILY LIVING (ADL): BATHING_REQUIRES_ASSISTANCE: 0

## 2019-05-13 NOTE — PROGRESS NOTES
Cc: UTI, wellness    HPI:  Magdi Pittman is a 78 y.o. here for Medicare Annual Wellness Visit     Patient Active Problem List    Diagnosis Date Noted   • Moderate persistent asthma without complication 12/13/2018   • Chronic obstructive pulmonary disease (HCC) 09/06/2018   • Fibromyalgia 04/03/2018   • Chronic respiratory failure with hypoxia (HCC) 04/03/2018   • Benign essential tremor 02/22/2018   • Leg swelling 11/02/2017   • Moderate episode of recurrent major depressive disorder (HCC) 09/29/2017   • Pulmonary nodules 06/23/2017   • Hepatic lesion 06/23/2017   • Deformity of left ankle joint 06/23/2017   • Hx of breast cancer 03/21/2017   • Osteoarthritis of hands, bilateral 03/26/2014   • Hypothyroid 03/26/2014       Current Outpatient Prescriptions   Medication Sig Dispense Refill   • predniSONE (DELTASONE) 10 MG Tab Take orally as follows: 40mg X 3 Days, 30mg X 3 Days, 20mg X 3 Days, 10mg X 3 Days, 5mg X 3 Days, then discontinue. 32 Tab 0   • cephALEXin (KEFLEX) 500 MG Cap Take 1 Cap by mouth 3 times a day for 5 days. 15 Cap 0   • benzonatate (TESSALON) 100 MG Cap Take 1 Cap by mouth 3 times a day as needed for Cough. 60 Cap 0   • Misc. Devices Misc 2L oxygen via nasal cannula at night while sleeping 1 Units 0   • thyroid (ARMOUR THYROID) 60 MG Tab Take 1 Tab by mouth every day. 90 Tab 3   • albuterol (PROAIR HFA) 108 (90 Base) MCG/ACT Aero Soln inhalation aerosol Inhale 1-2 Puffs by mouth every 6 hours as needed for Shortness of Breath. 1 Inhaler 3   • Acetaminophen (TYLENOL ARTHRITIS PAIN PO) Take 650 mg by mouth 3 times a day.     • Tiotropium Bromide Monohydrate (SPIRIVA RESPIMAT) 2.5 MCG/ACT Aero Soln Inhale 2 Puffs by mouth every day. 12 g 3   • triamterene-hctz (MAXZIDE-25/DYAZIDE) 37.5-25 MG Tab Take 1 Tab by mouth every day. 90 Tab 1   • Cyanocobalamin (VITAMIN B-12 PO) Take  by mouth every day.     • Multiple Vitamin (MULTIVITAMIN PO) Take 1 Tab by mouth every day.       No current  facility-administered medications for this visit.             Current supplements as per medication list.       Allergies: Asa [aspirin]; Quinine; and Sulfa drugs    Current social contact/activities: alta lunches with friends     She  reports that she has never smoked. She has never used smokeless tobacco. She reports that she drinks about 2.0 oz of alcohol per week . She reports that she does not use drugs.  Counseling given: Yes      DPA/Advanced Directive:  Patient has Living Will and Durable Power of  on file.     ROS:    Gait: Uses no assistive device  Ostomy: No  Other tubes: No  Amputations: No  Chronic oxygen use: Yes  Last eye exam: 11/2018  Wears hearing aids: No   : Reports urinary leakage during the last 6 months that has somewhat interfered with their daily activities or sleep.    Screening:    Depression Screening    Little interest or pleasure in doing things?  2 - more than half the days  Feeling down, depressed , or hopeless? 2 - more than half the days  Trouble falling or staying asleep, or sleeping too much?  0 - not at all  Feeling tired or having little energy?  3 - nearly every day  Poor appetite or overeating?  3 - nearly every day  Feeling bad about yourself - or that you are a failure or have let yourself or your family down? 0 - not at all  Trouble concentrating on things, such as reading the newspaper or watching television? 1 - several days  Moving or speaking so slowly that other people could have noticed.  Or the opposite - being so fidgety or restless that you have been moving around a lot more than usual?  2 - more than half the days  Thoughts that you would be better off dead, or of hurting yourself?  0 - not at all  Patient Health Questionnaire Score: 13    If depressive symptoms identified deferred to follow up visit unless specifically addressed in assessment and plan.    Interpretation of PHQ-9 Total Score   Score Severity   1-4 No Depression   5-9 Mild Depression    10-14 Moderate Depression   15-19 Moderately Severe Depression   20-27 Severe Depression      Screening for Cognitive Impairment    Three Minute Recall (village, kitchen, baby) 3/3    Demetrius clock face with all 12 numbers and set the hands to show 10 past 10.  Yes 5/5  Cognitive concerns identified deferred for follow up unless specifically addressed in assessment and plan.    Fall Risk Assessment    Has the patient had two or more falls in the last year or any fall with injury in the last year?  No    Safety Assessment    Throw rugs on floor.  Yes  Handrails on all stairs.  Yes  Good lighting in all hallways.  Yes  Difficulty hearing.  No  Patient counseled about all safety risks that were identified.    Functional Assessment ADLs    Are there any barriers preventing you from cooking for yourself or meeting nutritional needs?  No.    Are there any barriers preventing you from driving safely or obtaining transportation?  No.    Are there any barriers preventing you from using a telephone or calling for help?  No.    Are there any barriers preventing you from shopping?  No.    Are there any barriers preventing you from taking care of your own finances?  No.    Are there any barriers preventing you from managing your medications?  No.    Are there any barriers preventing you from showering, bathing or dressing yourself? No.    Are you currently engaging in any exercise or physical activity?  No.     What is your perception of your health?  Fair.      Health Maintenance Summary                PFT SCREENING-FEV1 AND FEV/FVC RATIO / SPIROMETRY SHOULD BE PERFORMED ANNUALLY Overdue 12/20/1958     Annual Wellness Visit Overdue 11/3/2018      Done 11/2/2017 Visit Dx: Medicare annual wellness visit, subsequent     Patient has more history with this topic...    IMM ZOSTER VACCINES Postponed 9/6/2019 Originally 3/22/2017. Insurance/Financial     Done 1/25/2017 Imm Admin: Zoster Vaccine Live (ZVL) (Zostavax)    IMM DTaP/Tdap/Td  "Vaccine Postponed 12/14/2019 Originally 12/20/1957. Patient Refused          Patient Care Team:  Nicole Holbrook M.D. as PCP - General (Family Medicine)  Yi Curiel M.D. (Radiation Therapy)  George Botello D.P.M. (Podiatry)  Catracho Sauceda M.D. (Pulmonary Medicine)      Social History   Substance Use Topics   • Smoking status: Never Smoker   • Smokeless tobacco: Never Used   • Alcohol use 2.0 oz/week     4 Standard drinks or equivalent per week      Comment: 4 per week     Family History   Problem Relation Age of Onset   • Cancer Mother         breast   • Lung Disease Father    • Cancer Sister         breast, pancreatic   • Cancer Brother    • Cancer Maternal Aunt    • Cancer Maternal Grandmother      She  has a past medical history of Arthritis; ASTHMA; Depression; H/O varicose vein ligation (3/26/2014); History of breast cancer (3/26/2014); Intention tremor (9/28/2016); Lumbar disc disease (3/26/2014); Migraines (3/26/2014); Rheumatoid factor positive (3/26/2014); and Unspecified disorder of thyroid.   Past Surgical History:   Procedure Laterality Date   • KNEE ARTHROSCOPY Right 2/26/2010    Procedure: WITH DEBRIDEMENT MEDIAL FEMORAL CONDYLE & PATELLA;  Surgeon: Gregorio Sloan M.D.;  Location: SURGERY [facility];  Service:    • MENISCECTOMY Right 2/26/2010    Procedure: PARTIAL LATERAL  ;  Surgeon: Gregorio Sloan M.D.;  Location: SURGERY [facility];  Service:    • BASAL CELL EXCISION  2009    left leg   • BREAST BIOPSY  1974    left breast   • TUBAL LIGATION  1973   • VEIN LIGATION  1966    bilateral legs   • TONSILLECTOMY  1958   • PB REMOVAL SYNOVIAL CYST,KNEE  1948    right knee       Exam:   /76 (BP Location: Right arm, Patient Position: Sitting)   Pulse 74   Temp 37 °C (98.6 °F) (Temporal)   Resp 16   Ht 1.626 m (5' 4\")   Wt 67.1 kg (148 lb)   SpO2 94%  Body mass index is 25.4 kg/m².    Hearing fair.    Dentition good  Alert, oriented in no acute distress.  Eye " contact is good, speech goal directed, affect calm    Assessment and Plan. The following treatment and monitoring plan is recommended:      Medicare annual wellness visit, subsequent  -     Subsequent Annual Wellness Visit - Includes PPPS ()    Acute cystitis with hematuria  Dysuria  New problem, will start treatment with Keflex  -     POCT Urinalysis  -     URINE CULTURE(NEW); Future  -     cephALEXin (KEFLEX) 500 MG Cap; Take 1 Cap by mouth 3 times a day for 5 days.    Chronic respiratory failure with hypoxia (HCC)  Stable, continue oxygen  -     CT-CHEST (THORAX) WITH; Future  -     PULMONARY FUNCTION TESTS -Test requested: Complete Pulmonary Function Test; Future    Chronic obstructive pulmonary disease, unspecified COPD type ([facility])  Shortness of breath  Unstable, will try a longer course of prednisone  -     CT-CHEST (THORAX) WITH; Future  -     PULMONARY FUNCTION TESTS -Test requested: Complete Pulmonary Function Test; Future  -     predniSONE (DELTASONE) 10 MG Tab; Take orally as follows: 40mg X 3 Days, 30mg X 3 Days, 20mg X 3 Days, 10mg X 3 Days, 5mg X 3 Days, then discontinue.    Fibromyalgia  Stable, referral to physiatry placed  -     REFERRAL TO PHYSIATRY (PMR)    Moderate episode of recurrent major depressive disorder (HCC)  Stable, patient declines to try another medication  -     Patient has been identified as being depressed and appropriate orders and counseling have been given  -     Subsequent Annual Wellness Visit - Includes PPPS ()    Moderate persistent asthma without complication  Stable, continue to monitor  -     Subsequent Annual Wellness Visit - Includes PPPS ()    Hypothyroidism due to acquired atrophy of thyroid  Stable, continue current medication.  Labs are up-to-date  -     Subsequent Annual Wellness Visit - Includes PPPS ()    Leg swelling  Stable, continue Maxide as needed  -     Subsequent Annual Wellness Visit - Includes PPPS ()    Hx of breast  cancer  Stable, sees oncology.  Last mammo 7/2018 (see media tab)  -     Subsequent Annual Wellness Visit - Includes PPPS ()      Services suggested: No services needed at this time  Health Care Screening: Age-appropriate preventive services recommended by USPTF and ACIP covered by Medicare were discussed today. Services ordered if indicated and agreed upon by the patient.  Referrals offered: Community-based lifestyle interventions to reduce health risks and promote self-management and wellness, fall prevention, nutrition, physical activity, tobacco-use cessation, weight loss, and mental health services as per orders if indicated.    Discussion today about general wellness and lifestyle habits:    · Prevent falls and reduce trip hazards; Cautioned about securing or removing rugs.  · Have a working fire alarm and carbon monoxide detector;   · Engage in regular physical activity and social activities     Follow-up: Return in about 2 months (around 7/13/2019) for routine follow up.

## 2019-05-13 NOTE — PROGRESS NOTES
cc:  UTI, wellness    Subjective:     Magdi Pittman is a 78 y.o. female presenting for:    1.  UTI: She is concerned that she may have a bladder infection.  Reports 3 weeks of urinary frequency, mild burning with urination.  She might have noticed some blood on the toilet paper as well.  She occasionally leaks urine as well.  Has tried nothing for this.  Denies any fevers, nausea, vomiting, abdominal pain, vaginal discharge, new back pain.    2.  Cough, shortness of breath: She continues to have a persistent cough and mild shortness of breath with exertion.  She was recently seen for COPD exacerbation, completed a course of azithromycin and 5 days of prednisone.  She continues to use her albuterol inhaler once every 3 days, which has improved.  She continues to use Spiriva.  She no longer follows with pulmonology, is not interested in making appointment with them.    3. Fibromyalgia:  She continues to have generalized muscle aches intermittently.  This has been stable, is now interested in getting a second opinion.  Is not interested in trying another medication.   Has been taking Tylenol 2-3 times a day, which seems to help.   We have tried Wellbutrin, Cymbalta, Celexa, Lexapro, sertraline, amitriptyline with no improvement or side effects.  She continues to see her counselor once a month.    Review of systems:  See above.       Current Outpatient Prescriptions:   •  predniSONE (DELTASONE) 10 MG Tab, Take orally as follows: 40mg X 3 Days, 30mg X 3 Days, 20mg X 3 Days, 10mg X 3 Days, 5mg X 3 Days, then discontinue., Disp: 32 Tab, Rfl: 0  •  cephALEXin (KEFLEX) 500 MG Cap, Take 1 Cap by mouth 3 times a day for 5 days., Disp: 15 Cap, Rfl: 0  •  benzonatate (TESSALON) 100 MG Cap, Take 1 Cap by mouth 3 times a day as needed for Cough., Disp: 60 Cap, Rfl: 0  •  Misc. Devices Misc, 2L oxygen via nasal cannula at night while sleeping, Disp: 1 Units, Rfl: 0  •  thyroid (ARMOUR THYROID) 60 MG Tab, Take 1 Tab by mouth every  "day., Disp: 90 Tab, Rfl: 3  •  albuterol (PROAIR HFA) 108 (90 Base) MCG/ACT Aero Soln inhalation aerosol, Inhale 1-2 Puffs by mouth every 6 hours as needed for Shortness of Breath., Disp: 1 Inhaler, Rfl: 3  •  Acetaminophen (TYLENOL ARTHRITIS PAIN PO), Take 650 mg by mouth 3 times a day., Disp: , Rfl:   •  Tiotropium Bromide Monohydrate (SPIRIVA RESPIMAT) 2.5 MCG/ACT Aero Soln, Inhale 2 Puffs by mouth every day., Disp: 12 g, Rfl: 3  •  triamterene-hctz (MAXZIDE-25/DYAZIDE) 37.5-25 MG Tab, Take 1 Tab by mouth every day., Disp: 90 Tab, Rfl: 1  •  Cyanocobalamin (VITAMIN B-12 PO), Take  by mouth every day., Disp: , Rfl:   •  Multiple Vitamin (MULTIVITAMIN PO), Take 1 Tab by mouth every day., Disp: , Rfl:     Allergies, past medical history, past surgical history, family history, social history reviewed and updated    Objective:     Vitals: /76 (BP Location: Right arm, Patient Position: Sitting)   Pulse 74   Temp 37 °C (98.6 °F) (Temporal)   Resp 16   Ht 1.626 m (5' 4\")   Wt 67.1 kg (148 lb)   SpO2 94%   BMI 25.40 kg/m²   General: Alert, pleasant, NAD  HEENT: Normocephalic.   Psych:  Affect/mood is normal, judgement is good, memory is intact, grooming is appropriate.    Point-of-care urinalysis: Small LE, trace blood    Assessment/Plan:     Magdi was seen today for uti and cough.    Diagnoses and all orders for this visit:    Acute cystitis with hematuria  Dysuria  Will start treatment for cystitis with Keflex.  We will also send urine for culture in the meantime.  We will recheck a UA/UCx in about 6 to 8 weeks to follow-up on the blood, will mail patient lab slips in July.  -     POCT Urinalysis  -     URINE CULTURE(NEW); Future  -     cephALEXin (KEFLEX) 500 MG Cap; Take 1 Cap by mouth 3 times a day for 5 days.    Chronic respiratory failure with hypoxia (HCC)  Chronic obstructive pulmonary disease, unspecified COPD type (HCC)  Shortness of breath  Somewhat improving, but symptoms continue to persist.  " Will try a longer course of a prednisone taper.  As she declines to see pulmonology anymore, PFTs and a chest CT ordered.  -     CT-CHEST (THORAX) WITH; Future  -     PULMONARY FUNCTION TESTS -Test requested: Complete Pulmonary Function Test; Future  -     predniSONE (DELTASONE) 10 MG Tab; Take orally as follows: 40mg X 3 Days, 30mg X 3 Days, 20mg X 3 Days, 10mg X 3 Days, 5mg X 3 Days, then discontinue.    Fibromyalgia  She is not interested in trying any more medications, but is interested in discussing with the specialist/injections if appropriate.  Referral to PM&R placed  -     REFERRAL TO PHYSIATRY (PMR)      Return in about 2 months (around 7/13/2019) for routine follow up.

## 2019-05-16 LAB
BACTERIA UR CULT: ABNORMAL
BACTERIA UR CULT: ABNORMAL
SIGNIFICANT IND 70042: ABNORMAL
SITE SITE: ABNORMAL
SOURCE SOURCE: ABNORMAL

## 2019-05-24 DIAGNOSIS — M79.89 LEG SWELLING: ICD-10-CM

## 2019-05-24 DIAGNOSIS — R91.8 PULMONARY NODULES: ICD-10-CM

## 2019-05-24 DIAGNOSIS — J44.9 CHRONIC OBSTRUCTIVE PULMONARY DISEASE, UNSPECIFIED COPD TYPE (HCC): ICD-10-CM

## 2019-06-06 ENCOUNTER — HOSPITAL ENCOUNTER (OUTPATIENT)
Dept: LAB | Facility: MEDICAL CENTER | Age: 79
End: 2019-06-06
Attending: PHYSICAL MEDICINE & REHABILITATION
Payer: MEDICARE

## 2019-06-06 ENCOUNTER — HOSPITAL ENCOUNTER (OUTPATIENT)
Dept: RADIOLOGY | Facility: MEDICAL CENTER | Age: 79
End: 2019-06-06
Attending: FAMILY MEDICINE
Payer: MEDICARE

## 2019-06-06 ENCOUNTER — HOSPITAL ENCOUNTER (OUTPATIENT)
Dept: RADIOLOGY | Facility: MEDICAL CENTER | Age: 79
End: 2019-06-06
Attending: PHYSICAL MEDICINE & REHABILITATION
Payer: MEDICARE

## 2019-06-06 ENCOUNTER — OFFICE VISIT (OUTPATIENT)
Dept: PHYSICAL MEDICINE AND REHAB | Facility: MEDICAL CENTER | Age: 79
End: 2019-06-06
Payer: MEDICARE

## 2019-06-06 ENCOUNTER — HOSPITAL ENCOUNTER (OUTPATIENT)
Dept: LAB | Facility: MEDICAL CENTER | Age: 79
End: 2019-06-06
Attending: FAMILY MEDICINE
Payer: MEDICARE

## 2019-06-06 VITALS
TEMPERATURE: 97.8 F | HEART RATE: 90 BPM | BODY MASS INDEX: 25.93 KG/M2 | HEIGHT: 64 IN | OXYGEN SATURATION: 99 % | DIASTOLIC BLOOD PRESSURE: 60 MMHG | SYSTOLIC BLOOD PRESSURE: 120 MMHG | WEIGHT: 151.9 LBS

## 2019-06-06 DIAGNOSIS — J44.9 CHRONIC OBSTRUCTIVE PULMONARY DISEASE, UNSPECIFIED COPD TYPE (HCC): ICD-10-CM

## 2019-06-06 DIAGNOSIS — J96.11 CHRONIC RESPIRATORY FAILURE WITH HYPOXIA (HCC): ICD-10-CM

## 2019-06-06 DIAGNOSIS — M54.2 CERVICALGIA: ICD-10-CM

## 2019-06-06 DIAGNOSIS — M25.511 RIGHT SHOULDER PAIN, UNSPECIFIED CHRONICITY: ICD-10-CM

## 2019-06-06 DIAGNOSIS — M79.10 MYALGIA: ICD-10-CM

## 2019-06-06 DIAGNOSIS — R06.02 SHORTNESS OF BREATH: ICD-10-CM

## 2019-06-06 DIAGNOSIS — M79.89 LEG SWELLING: ICD-10-CM

## 2019-06-06 DIAGNOSIS — M79.18 MYOFASCIAL PAIN: ICD-10-CM

## 2019-06-06 DIAGNOSIS — M21.962 DEFORMITY OF LEFT ANKLE JOINT: ICD-10-CM

## 2019-06-06 DIAGNOSIS — F32.A DEPRESSION, UNSPECIFIED DEPRESSION TYPE: ICD-10-CM

## 2019-06-06 DIAGNOSIS — R91.8 PULMONARY NODULES: ICD-10-CM

## 2019-06-06 LAB
25(OH)D3 SERPL-MCNC: 29 NG/ML (ref 30–100)
CREAT SERPL-MCNC: 0.61 MG/DL (ref 0.5–1.4)
CRP SERPL HS-MCNC: 5.7 MG/L (ref 0–7.5)
ERYTHROCYTE [SEDIMENTATION RATE] IN BLOOD BY WESTERGREN METHOD: 31 MM/HOUR (ref 0–30)
MAGNESIUM SERPL-MCNC: 1.8 MG/DL (ref 1.5–2.5)

## 2019-06-06 PROCEDURE — 99205 OFFICE O/P NEW HI 60 MIN: CPT | Performed by: PHYSICAL MEDICINE & REHABILITATION

## 2019-06-06 PROCEDURE — 86141 C-REACTIVE PROTEIN HS: CPT | Mod: GA

## 2019-06-06 PROCEDURE — 83735 ASSAY OF MAGNESIUM: CPT | Mod: GA

## 2019-06-06 PROCEDURE — 72040 X-RAY EXAM NECK SPINE 2-3 VW: CPT

## 2019-06-06 PROCEDURE — 82306 VITAMIN D 25 HYDROXY: CPT | Mod: GA

## 2019-06-06 PROCEDURE — 73030 X-RAY EXAM OF SHOULDER: CPT | Mod: RT

## 2019-06-06 PROCEDURE — 36415 COLL VENOUS BLD VENIPUNCTURE: CPT | Mod: GA

## 2019-06-06 PROCEDURE — 85652 RBC SED RATE AUTOMATED: CPT

## 2019-06-06 PROCEDURE — 82565 ASSAY OF CREATININE: CPT

## 2019-06-06 ASSESSMENT — PATIENT HEALTH QUESTIONNAIRE - PHQ9
SUM OF ALL RESPONSES TO PHQ QUESTIONS 1-9: 14
CLINICAL INTERPRETATION OF PHQ2 SCORE: 5
5. POOR APPETITE OR OVEREATING: 2 - MORE THAN HALF THE DAYS

## 2019-06-06 ASSESSMENT — PAIN SCALES - GENERAL: PAINLEVEL: 4=SLIGHT-MODERATE PAIN

## 2019-06-06 NOTE — PROGRESS NOTES
"New patient note    Physiatry (physical medicine and  Rehabilitation), interventional spine and sports medicine    Date of Service: 6/6/2019    Chief complaint: Fibromyalgia    HISTORY    HPI: Magdi Pittman 78 y.o. female who presents today for evaluation of her fibromyalgia.      Her feeling is that she has \"discomfort\" that sometimes makes it difficult for her to get out of the chair or to lift things.  She is concerned about trouble in her right shoulder.  Also, she sometimes wakes up with a headache/pain in the back of her neck.  This region of pain is aching and most troubling for her.      Her thighs feel weak.  This has been going on for about 2-3 years.  She was diagnosed with fibromyalgia in the last 2 years.    She reports that she thinks that she has depression and this is likely the greatest concern.  Her energy level has been low and this bothers her as well.    It is difficult for her to write clearly and this is troubling.  She has been diagnosed with having a benign essential tremor.  This makes it difficult for her to ayleen, which she has enjoyed.  This makes her feel more depressed, also.    ORT: 8  PHQ-9: 14  Depression Screening    Interpretation of PHQ-9 Total Score   Score Severity   1-4 No Depression   5-9 Mild Depression   10-14 Moderate Depression   15-19 Moderately Severe Depression   20-27 Severe Depression          Medical records review:  I reviewed the note from the referring provider Nicole Holbrook M.D. dated 05/13/2019.  They have tried wellbutrin, cymbalta, celexa, lexapro, sertraline, amitriptyline without improvement.  She does see a counselor once a month.  They agreed on referral here.    Previous treatments:    Physical Therapy: Yes    Medications the patient is tried: Tylenol, up to three in 24hours.  She has tried wellbutrin, cymbalta, celexa, lexapro, sertraline and amitriptyline.    Previous interventions: none    Previous surgeries to relieve the above pain:  " none      ROS: as noted in HPI, PMH or below  Resp: Bronchitis, COPD exacerbation  Psych: Depression  Endo: Hypothyroidism  : recent UTI symptoms.    Red Flags ROS:   Fever, Chills, Sweats: Denies  Involuntary Weight Loss: Denies  Bladder Incontinence: Denies  Bowel Incontinence: denies  Saddle Anesthesia: Denies    All other systems reviewed and negative.       PMHx:   Past Medical History:   Diagnosis Date   • Arthritis    • ASTHMA    • Depression    • H/O varicose vein ligation 3/26/2014   • History of breast cancer 3/26/2014   • Intention tremor 9/28/2016   • Lumbar disc disease 3/26/2014   • Migraines 3/26/2014   • Rheumatoid factor positive 3/26/2014    -CCP, Negative hepatitis panel     • Unspecified disorder of thyroid        PSHx:   Past Surgical History:   Procedure Laterality Date   • KNEE ARTHROSCOPY Right 2/26/2010    Procedure: WITH DEBRIDEMENT MEDIAL FEMORAL CONDYLE & PATELLA;  Surgeon: Gregorio Sloan M.D.;  Location: SURGERY [facility];  Service:    • MENISCECTOMY Right 2/26/2010    Procedure: PARTIAL LATERAL  ;  Surgeon: Gregorio Sloan M.D.;  Location: SURGERY [facility];  Service:    • BASAL CELL EXCISION  2009    left leg   • BREAST BIOPSY  1974    left breast   • TUBAL LIGATION  1973   • VEIN LIGATION  1966    bilateral legs   • TONSILLECTOMY  1958   • PB REMOVAL SYNOVIAL CYST,KNEE  1948    right knee       Family history   Family History   Problem Relation Age of Onset   • Cancer Mother         breast   • Lung Disease Father    • Cancer Sister         breast, pancreatic   • Cancer Brother    • Cancer Maternal Aunt    • Cancer Maternal Grandmother          Medications:   Current Outpatient Prescriptions   Medication   • Milnacipran HCl 12.5 MG Tab   • thyroid (ARMOUR THYROID) 60 MG Tab   • Acetaminophen (TYLENOL ARTHRITIS PAIN PO)   • Tiotropium Bromide Monohydrate (SPIRIVA RESPIMAT) 2.5 MCG/ACT Aero Soln   • Cyanocobalamin (VITAMIN B-12 PO)   • Multiple Vitamin (MULTIVITAMIN  "PO)   • predniSONE (DELTASONE) 10 MG Tab   • benzonatate (TESSALON) 100 MG Cap   • Misc. Devices Misc   • albuterol (PROAIR HFA) 108 (90 Base) MCG/ACT Aero Soln inhalation aerosol   • triamterene-hctz (MAXZIDE-25/DYAZIDE) 37.5-25 MG Tab     No current facility-administered medications for this visit.        Allergies:   Allergies   Allergen Reactions   • Asa [Aspirin]      Does not take D/T asthma hx   • Quinine    • Sulfa Drugs Rash     Skin peeling       Social Hx:   Social History     Social History   • Marital status:      Spouse name: N/A   • Number of children: N/A   • Years of education: N/A     Occupational History   • Not on file.     Social History Main Topics   • Smoking status: Never Smoker   • Smokeless tobacco: Never Used   • Alcohol use 2.0 oz/week     4 Standard drinks or equivalent per week      Comment: 1 per week   • Drug use: No   • Sexual activity: Not Currently     Other Topics Concern   •  Service No   • Blood Transfusions No   • Caffeine Concern No   • Occupational Exposure No   • Hobby Hazards No   • Sleep Concern No   • Stress Concern Yes   • Weight Concern No   • Special Diet No   • Back Care No   • Exercise No   • Bike Helmet No   • Seat Belt Yes   • Self-Exams No     Social History Narrative   • No narrative on file         EXAMINATION     Physical Exam:   Vitals: /60 (BP Location: Right arm, Patient Position: Sitting, BP Cuff Size: Adult)   Pulse 90   Temp 36.6 °C (97.8 °F) (Temporal)   Ht 1.626 m (5' 4\")   Wt 68.9 kg (151 lb 14.4 oz)   SpO2 99%     Constitutional:   Body Habitus: Body mass index is 26.07 kg/m².  Cooperation: Fully cooperates with exam  Appearance: Well-groomed, well-nourished, not disheveled, in no acute distress    Eyes: No scleral icterus,, no proptosis    ENT -no obvious auditory deficits, no facial droop     Skin -no rashes or lesions noted     Respiratory-  breathing comfortable on room air, no audible wheezing    Cardiovascular- " capillary refills less than 2 seconds. No lower extremity edema is noted.     Psychiatric- alert and oriented ×3. Normal affect.     Gait - normal gait, no use of ambulatory device, nonantalgic. The patient has difficulty with heel walking.  The patient has difficulty with toe walking. She is able to balance on each leg for more than 3 seconds    Musculoskeletal -   Cervical spine   Inspection: No deformities of the skin over the cervical spine. No rashes or lesions.    decreased A/P ROM in all directions, with  pain at end range    Spurling’s sign: negative bilaterally    Note of mild atrophy of the hand intrinsics and moderate atrophy thenar eminence bilaterally    Mild tenderness to palpation of the cervical facets    Thoracic/Lumbar Spine/Sacral Spine/Hips   Inspection: No evidence of atrophy in bilateral lower extremities throughout     ROM: decreased AROM with flexion, extension, lateral flexion, and rotation bilaterally, without pain     There is note of collapse of the left ankle    Palpation:   No tenderness to palpation in midline at T1-T12 levels. Tenderness to palpation in the left and right of the midline T1-L5  palpation over SI joint: positive bilaterally    palpation over buttock: positive bilaterally    palpation in hip or over the greater trochanters: positive bilaterally    Tenderness to palpation medial elbows, medial knees bilaterally    Lumbar spine Special tests  Neuro tension  Straight leg test negative bilaterally        HIP  Range of motion in the hips is within normal limits in internal and external rotation bilaterally    SI joint tests  Observation patient sits on one buttocks: Negative  DANIA test negative bilaterally       Neuro       Key points for the international standards for neurological classification of spinal cord injury (ISNCSCI) to light touch.     Dermatome R L   C4 2 2   C5 2 2   C6 2 2   C7 2 2   C8 2 2   T1 2 2   T2 2 2   L2 2 2   L3 2 2   L4 2 2   L5 2 2   S1 2 2   S2 2  2           Motor Exam Upper Extremities   ? Myotome R L   Shoulder flexion C5 5 5   Elbow flexion C5 5 5   Wrist extension C6 5 5   Elbow extension C7 5 5   Finger flexion C8 5 5   Finger abduction T1 5 5         Motor Exam Lower Extremities    ? Myotome R L   Hip flexion L2 5 5   Knee extension L3 5 5   Ankle dorsiflexion L4 5 5   Toe extension L5 5 5   Ankle plantarflexion S1 5 5         Cervantes’s sign negative bilaterally   Babinski sign negative bilaterally   Clonus of the ankle negative bilaterally   Tinel's is negative at the wrists and elbows bilaterally    Reflexes  ?  R L   Biceps  2+ 2+   Brachioradialis  2+ 2+   Patella  2+ 2+   Achilles   2+ 2+       MEDICAL DECISION MAKING    Medical records review: see under HPI section.     DATA    Labs:   Lab Results   Component Value Date/Time    SODIUM 139 03/19/2019 02:08 PM    POTASSIUM 3.6 03/19/2019 02:08 PM    CHLORIDE 102 03/19/2019 02:08 PM    CO2 27 03/19/2019 02:08 PM    ANION 10.0 03/19/2019 02:08 PM    GLUCOSE 107 (H) 03/19/2019 02:08 PM    BUN 22 03/19/2019 02:08 PM    CREATININE 0.57 03/19/2019 02:08 PM    CALCIUM 9.6 03/19/2019 02:08 PM    ASTSGOT 27 03/08/2018 11:54 AM    ALTSGPT 31 03/08/2018 11:54 AM    TBILIRUBIN 0.5 03/08/2018 11:54 AM    ALBUMIN 4.4 03/08/2018 11:54 AM    TOTPROTEIN 7.4 03/08/2018 11:54 AM    GLOBULIN 3.0 03/08/2018 11:54 AM    AGRATIO 1.5 03/08/2018 11:54 AM     CRP: 03/8/2018: 4.1    No results found for: PROTHROMBTM, INR     Lab Results   Component Value Date/Time    WBC 6.6 03/08/2018 11:54 AM    RBC 4.31 03/08/2018 11:54 AM    HEMOGLOBIN 13.9 03/08/2018 11:54 AM    HEMATOCRIT 42.1 03/08/2018 11:54 AM    MCV 97.7 03/08/2018 11:54 AM    MCH 32.3 03/08/2018 11:54 AM    MCHC 33.0 (L) 03/08/2018 11:54 AM    MPV 9.4 03/08/2018 11:54 AM    NEUTSPOLYS 60.50 01/27/2017 12:05 PM    LYMPHOCYTES 18.70 (L) 01/27/2017 12:05 PM    MONOCYTES 11.60 01/27/2017 12:05 PM    EOSINOPHILS 7.90 (H) 01/27/2017 12:05 PM    BASOPHILS 1.10 01/27/2017  12:05 PM        No results found for: HBA1C     Imaging: I personally reviewed following images, these are my reads  Xray hips and pelvis 03/8/2018  Degenerative changes in the hips bilaterally.  Lumbar facet degenerative changes      IMAGING radiology reads. I reviewed the following radiology reads   Xray hips with pelvis: 03/08/2018  No radiographic evidence of acute traumatic bone injury.    Symmetric degenerative changes about both hip joints.    Lumbosacral facet joint degenerative changes.                                                                                                                                           Results for orders placed during the hospital encounter of 01/27/17   DX-SHOULDER 2+ RIGHT    Impression Degenerative changes involving the glenohumeral and acromioclavicular joints.                     Diagnosis   Visit Diagnoses     ICD-10-CM   1. Myofascial pain M79.18   2. Cervicalgia M54.2   3. Right shoulder pain, unspecified chronicity M25.511   4. Deformity of left ankle joint M21.962   5. Myalgia M79.10   6. Depression, unspecified depression type F32.9           ASSESSMENT:  Magdi Pittman 78 y.o. female with myofascial pain that could suggest fibromyalgia     Magdi was seen today for new patient.    Diagnoses and all orders for this visit:    Myofascial pain  -     VITAMIN D,25 HYDROXY; Future  -     CRP HIGH SENSITIVE (CARDIAC); Future  -     WESTERGREN SED RATE; Future  -     MAGNESIUM; Future  -     Milnacipran HCl 12.5 MG Tab; Take 1 Tab by mouth 2 times a day for 30 days.    Cervicalgia  -     VITAMIN D,25 HYDROXY; Future  -     CRP HIGH SENSITIVE (CARDIAC); Future  -     DX-CERVICAL SPINE-2 OR 3 VIEWS; Future  -     Milnacipran HCl 12.5 MG Tab; Take 1 Tab by mouth 2 times a day for 30 days.    Right shoulder pain, unspecified chronicity  -     DX-SHOULDER 2+ RIGHT; Future    Deformity of left ankle joint    Myalgia  -     CRP HIGH SENSITIVE (CARDIAC); Future  -      VINCE SED RATE; Future  -     MAGNESIUM; Future    Depression, unspecified depression type  -     Milnacipran HCl 12.5 MG Tab; Take 1 Tab by mouth 2 times a day for 30 days.      1. Discussed her depression and encouraged her to continue to work with her counselor.  2. Xrays of cervical spine and right shoulder  3. Check labs as above.  4. Discussed left ankle collapse, she has tried to address this in the past.  We can revisit.  5. Trial of milnacipran ordered.  She has tried multiple other medications, but given the depression and myofascial pain, this might be helpful.  She is agreeable to this.    Follow-up: Return in about 4 weeks (around 7/4/2019).    Thank you very much for asking me to participate in Magdi Pittman's care.  Please contact me with any questions or concerns.      Please note that this dictation was created using voice recognition software. I have made every reasonable attempt to correct obvious errors but there may be errors of grammar and content that I may have overlooked prior to finalization of this note.      Issa Stern MD  Physical Medicine and Rehabilitation  Interventional Spine and Sports Physiatry  [facility]

## 2019-06-11 ENCOUNTER — APPOINTMENT (OUTPATIENT)
Dept: RADIOLOGY | Facility: MEDICAL CENTER | Age: 79
End: 2019-06-11
Attending: FAMILY MEDICINE
Payer: MEDICARE

## 2019-06-12 ENCOUNTER — TELEPHONE (OUTPATIENT)
Dept: PHYSICAL MEDICINE AND REHAB | Facility: MEDICAL CENTER | Age: 79
End: 2019-06-12

## 2019-06-12 DIAGNOSIS — E55.9 VITAMIN D DEFICIENCY: ICD-10-CM

## 2019-06-12 RX ORDER — CHOLECALCIFEROL (VITAMIN D3) 25 MCG
1 CAPSULE ORAL DAILY
Qty: 30 CAP | Refills: 1 | Status: SHIPPED | OUTPATIENT
Start: 2019-06-12 | End: 2019-06-21 | Stop reason: SDUPTHER

## 2019-06-12 NOTE — TELEPHONE ENCOUNTER
Please have Coralee take vitamin D3 1000IU daily.    There are some other lab changes, elevated ESR and higher CRP that I am going to communicate to Dr. Holbrook.    Thanks.

## 2019-06-14 DIAGNOSIS — M35.3 POLYMYALGIA RHEUMATICA (HCC): ICD-10-CM

## 2019-06-14 RX ORDER — PREDNISONE 10 MG/1
10 TABLET ORAL DAILY
Qty: 30 TAB | Refills: 0 | Status: SHIPPED | OUTPATIENT
Start: 2019-06-14 | End: 2019-06-21 | Stop reason: SDUPTHER

## 2019-06-18 ENCOUNTER — HOSPITAL ENCOUNTER (OUTPATIENT)
Dept: RADIOLOGY | Facility: MEDICAL CENTER | Age: 79
End: 2019-06-18
Attending: FAMILY MEDICINE
Payer: MEDICARE

## 2019-06-18 PROCEDURE — 700117 HCHG RX CONTRAST REV CODE 255: Performed by: FAMILY MEDICINE

## 2019-06-18 PROCEDURE — 71260 CT THORAX DX C+: CPT

## 2019-06-18 RX ADMIN — IOHEXOL 75 ML: 350 INJECTION, SOLUTION INTRAVENOUS at 10:19

## 2019-06-20 ENCOUNTER — HOSPITAL ENCOUNTER (OUTPATIENT)
Dept: PULMONOLOGY | Facility: MEDICAL CENTER | Age: 79
End: 2019-06-20
Attending: FAMILY MEDICINE
Payer: MEDICARE

## 2019-06-20 DIAGNOSIS — J44.9 CHRONIC OBSTRUCTIVE PULMONARY DISEASE, UNSPECIFIED COPD TYPE (HCC): ICD-10-CM

## 2019-06-20 DIAGNOSIS — R06.02 SHORTNESS OF BREATH: ICD-10-CM

## 2019-06-20 DIAGNOSIS — J96.11 CHRONIC RESPIRATORY FAILURE WITH HYPOXIA (HCC): ICD-10-CM

## 2019-06-20 PROCEDURE — 94726 PLETHYSMOGRAPHY LUNG VOLUMES: CPT

## 2019-06-20 PROCEDURE — 94729 DIFFUSING CAPACITY: CPT

## 2019-06-20 PROCEDURE — 94060 EVALUATION OF WHEEZING: CPT | Mod: 26 | Performed by: INTERNAL MEDICINE

## 2019-06-20 PROCEDURE — 94726 PLETHYSMOGRAPHY LUNG VOLUMES: CPT | Mod: 26 | Performed by: INTERNAL MEDICINE

## 2019-06-20 PROCEDURE — 94060 EVALUATION OF WHEEZING: CPT

## 2019-06-20 PROCEDURE — 94729 DIFFUSING CAPACITY: CPT | Mod: 26 | Performed by: INTERNAL MEDICINE

## 2019-06-20 ASSESSMENT — PULMONARY FUNCTION TESTS
FEV1/FVC: 63
FEV1_PERCENT_CHANGE: 4
FEV1/FVC: 62.44
FEV1_PERCENT_PREDICTED: 63
FEV1/FVC_PERCENT_CHANGE: 5
FEV1: 1.22
FEV1/FVC_PERCENT_LLN: 64
FEV1_LLN: 1.60
FEV1_PERCENT_CHANGE: 0
FEV1/FVC_PERCENT_PREDICTED: 76
FEV1/FVC: 60
FEV1: 1.28
FEV1/FVC_PERCENT_PREDICTED: 81
FVC_LLN: 2.11
FVC_PERCENT_PREDICTED: 80
FEV1/FVC_PERCENT_PREDICTED: 77
FVC_PERCENT_PREDICTED: 81
FEV1_PERCENT_PREDICTED: 66
FVC: 2.05
FEV1/FVC: 60
FEV1/FVC_PERCENT_LLN: 64
FVC: 2.05
FEV1/FVC_PERCENT_PREDICTED: 83
FEV1_PREDICTED: 1.92
FVC_LLN: 2.11
FVC_PREDICTED: 2.53
FEV1/FVC_PERCENT_PREDICTED: 78
FEV1_LLN: 1.60
FEV1/FVC_PREDICTED: 77

## 2019-06-21 ENCOUNTER — OFFICE VISIT (OUTPATIENT)
Dept: PHYSICAL MEDICINE AND REHAB | Facility: MEDICAL CENTER | Age: 79
End: 2019-06-21
Payer: MEDICARE

## 2019-06-21 VITALS
BODY MASS INDEX: 26.16 KG/M2 | WEIGHT: 153.22 LBS | HEART RATE: 70 BPM | OXYGEN SATURATION: 93 % | DIASTOLIC BLOOD PRESSURE: 82 MMHG | TEMPERATURE: 98.4 F | SYSTOLIC BLOOD PRESSURE: 118 MMHG | HEIGHT: 64 IN

## 2019-06-21 DIAGNOSIS — E55.9 VITAMIN D DEFICIENCY: ICD-10-CM

## 2019-06-21 DIAGNOSIS — M35.3 POLYMYALGIA RHEUMATICA (HCC): ICD-10-CM

## 2019-06-21 DIAGNOSIS — M79.7 FIBROMYALGIA: ICD-10-CM

## 2019-06-21 PROCEDURE — 99214 OFFICE O/P EST MOD 30 MIN: CPT | Performed by: PHYSICAL MEDICINE & REHABILITATION

## 2019-06-21 RX ORDER — PREDNISONE 10 MG/1
10 TABLET ORAL DAILY
Qty: 30 TAB | Refills: 0 | Status: SHIPPED | OUTPATIENT
Start: 2019-07-12 | End: 2019-08-07 | Stop reason: SDUPTHER

## 2019-06-21 RX ORDER — CHOLECALCIFEROL (VITAMIN D3) 25 MCG
1 CAPSULE ORAL DAILY
Qty: 30 CAP | Refills: 1 | Status: SHIPPED | OUTPATIENT
Start: 2019-06-21 | End: 2019-07-21

## 2019-06-21 ASSESSMENT — PAIN SCALES - GENERAL: PAINLEVEL: 4=SLIGHT-MODERATE PAIN

## 2019-06-21 ASSESSMENT — PATIENT HEALTH QUESTIONNAIRE - PHQ9
SUM OF ALL RESPONSES TO PHQ QUESTIONS 1-9: 7
CLINICAL INTERPRETATION OF PHQ2 SCORE: 2
5. POOR APPETITE OR OVEREATING: 1 - SEVERAL DAYS

## 2019-06-21 NOTE — PROGRESS NOTES
Follow-up patient note    Physiatry (physical medicine and  Rehabilitation), interventional spine and sports medicine    Date of Service: 6/21/2019    Chief complaint: Fibromyalgia    HISTORY    HPI: Magdi Pittman 78 y.o. female who presents today for evaluation of her fibromyalgia and after starting prednisone for suspected PMR.    Since the last visit, she reports that she has been doing better.  She reports that she is feeling like she has more energy since starting savella and prednisone one week ago.  In fact, she is not feeling so much like she just wants to take a nap.     Also, she still feels a little bit weak in her thighs.  No falls.    Her mood is improving.  Little things matter to her again.  She is concerned about dishes and watering the plants, instead of not caring.    ORT: 8  PHQ-9: 14, today is 7    Depression Screening    Interpretation of PHQ-9 Total Score   Score Severity   1-4 No Depression   5-9 Mild Depression   10-14 Moderate Depression   15-19 Moderately Severe Depression   20-27 Severe Depression      Medical records review:  I reviewed the note from the referring provider Nicole Holbrook M.D. dated 05/13/2019.  They have tried wellbutrin, cymbalta, celexa, lexapro, sertraline, amitriptyline without improvement.  She does see a counselor once a month.  They agreed on referral here.    Previous treatments:    Physical Therapy: Yes    Medications the patient is tried: Tylenol, up to three in 24hours.  She has tried wellbutrin, cymbalta, celexa, lexapro, sertraline and amitriptyline.    Previous interventions: none    Previous surgeries to relieve the above pain:  none      ROS: as noted in HPI, PMH or below.  No changes from 06/06/2019 except as HPI  Resp: Bronchitis, COPD exacerbation  Psych: Depression  Endo: Hypothyroidism  : recent UTI symptoms.    Red Flags ROS:   Fever, Chills, Sweats: Denies  Involuntary Weight Loss: Denies  Bladder Incontinence: Denies  Bowel Incontinence:  denies  Saddle Anesthesia: Denies    All other systems reviewed and negative.       PMHx:   Past Medical History:   Diagnosis Date   • Arthritis    • ASTHMA    • Depression    • H/O varicose vein ligation 3/26/2014   • History of breast cancer 3/26/2014   • Intention tremor 9/28/2016   • Lumbar disc disease 3/26/2014   • Migraines 3/26/2014   • Rheumatoid factor positive 3/26/2014    -CCP, Negative hepatitis panel     • Unspecified disorder of thyroid        PSHx:   Past Surgical History:   Procedure Laterality Date   • KNEE ARTHROSCOPY Right 2/26/2010    Procedure: WITH DEBRIDEMENT MEDIAL FEMORAL CONDYLE & PATELLA;  Surgeon: Gregorio Sloan M.D.;  Location: SURGERY [facility];  Service:    • MENISCECTOMY Right 2/26/2010    Procedure: PARTIAL LATERAL  ;  Surgeon: Gregorio Sloan M.D.;  Location: SURGERY [facility];  Service:    • BASAL CELL EXCISION  2009    left leg   • BREAST BIOPSY  1974    left breast   • TUBAL LIGATION  1973   • VEIN LIGATION  1966    bilateral legs   • TONSILLECTOMY  1958   • PB REMOVAL SYNOVIAL CYST,KNEE  1948    right knee       Family history   Family History   Problem Relation Age of Onset   • Cancer Mother         breast   • Lung Disease Father    • Cancer Sister         breast, pancreatic   • Cancer Brother    • Cancer Maternal Aunt    • Cancer Maternal Grandmother          Medications:   Current Outpatient Prescriptions   Medication   • [START ON 7/12/2019] predniSONE (DELTASONE) 10 MG Tab   • Cholecalciferol (VITAMIN D-3) 1000 units Cap   • thyroid (ARMOUR THYROID) 60 MG Tab   • albuterol (PROAIR HFA) 108 (90 Base) MCG/ACT Aero Soln inhalation aerosol   • Acetaminophen (TYLENOL ARTHRITIS PAIN PO)   • Tiotropium Bromide Monohydrate (SPIRIVA RESPIMAT) 2.5 MCG/ACT Aero Soln   • triamterene-hctz (MAXZIDE-25/DYAZIDE) 37.5-25 MG Tab   • Cyanocobalamin (VITAMIN B-12 PO)   • Multiple Vitamin (MULTIVITAMIN PO)   • Milnacipran HCl 12.5 MG Tab   • benzonatate (TESSALON) 100 MG Cap  "  • Misc. Devices Misc     No current facility-administered medications for this visit.        Allergies:   Allergies   Allergen Reactions   • Asa [Aspirin]      Does not take D/T asthma hx   • Quinine    • Sulfa Drugs Rash     Skin peeling       Social Hx:   Social History     Social History   • Marital status:      Spouse name: N/A   • Number of children: N/A   • Years of education: N/A     Occupational History   • Not on file.     Social History Main Topics   • Smoking status: Never Smoker   • Smokeless tobacco: Never Used   • Alcohol use 2.0 oz/week     4 Standard drinks or equivalent per week      Comment: 1 per week   • Drug use: No   • Sexual activity: Not Currently     Other Topics Concern   •  Service No   • Blood Transfusions No   • Caffeine Concern No   • Occupational Exposure No   • Hobby Hazards No   • Sleep Concern No   • Stress Concern Yes   • Weight Concern No   • Special Diet No   • Back Care No   • Exercise No   • Bike Helmet No   • Seat Belt Yes   • Self-Exams No     Social History Narrative   • No narrative on file         EXAMINATION     Physical Exam:   Vitals: /82 (BP Location: Right arm, Patient Position: Sitting, BP Cuff Size: Adult)   Pulse 70   Temp 36.9 °C (98.4 °F) (Temporal)   Ht 1.626 m (5' 4\")   Wt 69.5 kg (153 lb 3.5 oz)   SpO2 93%     Constitutional:   Body Habitus: Body mass index is 26.3 kg/m².  Cooperation: Fully cooperates with exam  Appearance: Well-groomed, well-nourished, not disheveled, in no acute distress    Eyes: No scleral icterus,, no proptosis    ENT -no obvious auditory deficits, no facial droop     Skin -no rashes or lesions noted     Respiratory-  breathing comfortable on room air, no audible wheezing    Cardiovascular- capillary refills less than 2 seconds. No lower extremity edema is noted.     Psychiatric- alert and oriented ×3. Normal affect.     Gait - normal gait, no use of ambulatory device, nonantalgic.     Musculoskeletal - "   Cervical spine   Inspection: No deformities of the skin over the cervical spine. No rashes or lesions.    Thoracic/Lumbar Spine/Sacral Spine/Hips   Inspection: No evidence of atrophy in bilateral lower extremities throughout     There is note of collapse of the left ankle    Neuro     No focal motor deficits of the upper or lower extremities.    Mild tenderness persists at medial elbows and medial knees        MEDICAL DECISION MAKING    Medical records review: see under HPI section.     DATA    Labs:   Lab Results   Component Value Date/Time    SODIUM 139 03/19/2019 02:08 PM    POTASSIUM 3.6 03/19/2019 02:08 PM    CHLORIDE 102 03/19/2019 02:08 PM    CO2 27 03/19/2019 02:08 PM    ANION 10.0 03/19/2019 02:08 PM    GLUCOSE 107 (H) 03/19/2019 02:08 PM    BUN 22 03/19/2019 02:08 PM    CREATININE 0.61 06/06/2019 02:50 PM    CALCIUM 9.6 03/19/2019 02:08 PM    ASTSGOT 27 03/08/2018 11:54 AM    ALTSGPT 31 03/08/2018 11:54 AM    TBILIRUBIN 0.5 03/08/2018 11:54 AM    ALBUMIN 4.4 03/08/2018 11:54 AM    TOTPROTEIN 7.4 03/08/2018 11:54 AM    GLOBULIN 3.0 03/08/2018 11:54 AM    AGRATIO 1.5 03/08/2018 11:54 AM     CRP: 03/8/2018: 4.1    No results found for: PROTHROMBTM, INR     Lab Results   Component Value Date/Time    WBC 6.6 03/08/2018 11:54 AM    RBC 4.31 03/08/2018 11:54 AM    HEMOGLOBIN 13.9 03/08/2018 11:54 AM    HEMATOCRIT 42.1 03/08/2018 11:54 AM    MCV 97.7 03/08/2018 11:54 AM    MCH 32.3 03/08/2018 11:54 AM    MCHC 33.0 (L) 03/08/2018 11:54 AM    MPV 9.4 03/08/2018 11:54 AM    NEUTSPOLYS 60.50 01/27/2017 12:05 PM    LYMPHOCYTES 18.70 (L) 01/27/2017 12:05 PM    MONOCYTES 11.60 01/27/2017 12:05 PM    EOSINOPHILS 7.90 (H) 01/27/2017 12:05 PM    BASOPHILS 1.10 01/27/2017 12:05 PM        No results found for: HBA1C     Imaging: I personally reviewed following images, these are my reads  Xray cervical spine 06/06/2019:      Xray hips and pelvis 03/8/2018  Degenerative changes in the hips bilaterally.  Lumbar facet  degenerative changes      IMAGING radiology reads. I reviewed the following radiology reads   Xray cervical spine 06/06/2019  1.  No compression deformity or acute fracture.    2.  Moderate to severe degenerative disc disease and facet arthropathy.    3.  Anterolisthesis at the C6-C7 level is noted and likely related to facet arthropathy.    Xray hips with pelvis: 03/08/2018  No radiographic evidence of acute traumatic bone injury.    Symmetric degenerative changes about both hip joints.    Lumbosacral facet joint degenerative changes.                                                                                                                                           Results for orders placed during the hospital encounter of 01/27/17   DX-SHOULDER 2+ RIGHT    Impression Degenerative changes involving the glenohumeral and acromioclavicular joints.                     Diagnosis   Visit Diagnoses     ICD-10-CM   1. Polymyalgia rheumatica (HCC) M35.3   2. Fibromyalgia M79.7   3. Vitamin D deficiency E55.9           ASSESSMENT:  Magdi Pittman 78 y.o. female with myofascial pain that could suggest fibromyalgia     Magdi was seen today for follow-up.    Diagnoses and all orders for this visit:    Polymyalgia rheumatica (HCC)  -     predniSONE (DELTASONE) 10 MG Tab; Take 1 Tab by mouth every day for 30 days.    Fibromyalgia    Vitamin D deficiency  -     Cholecalciferol (VITAMIN D-3) 1000 units Cap; Take 1 Cap by mouth every day for 30 days.         1. Discussed continuing savella at 12.5mg.  She has enough to continue this for the next month.  We discussed that we will consider increasing the dose at that time.  2. Xrays of cervical spine and right shoulder  3. Vitamin D3 800-1000IU  4. Continue prednisone at 10mg.    5. Follow-up with PCP, Dr. Holbrook as scheduled.      Follow-up: Return in about 4 weeks (around 7/19/2019).    Thank you very much for asking me to participate in Magdi Pittman's care.  Please  contact me with any questions or concerns.      Please note that this dictation was created using voice recognition software. I have made every reasonable attempt to correct obvious errors but there may be errors of grammar and content that I may have overlooked prior to finalization of this note.      Issa Stern MD  Physical Medicine and Rehabilitation  Interventional Spine and Sports Physiatry  [facility]

## 2019-07-02 DIAGNOSIS — J98.11 FOCAL ATELECTASIS: ICD-10-CM

## 2019-07-02 DIAGNOSIS — J44.9 CHRONIC OBSTRUCTIVE PULMONARY DISEASE, UNSPECIFIED COPD TYPE (HCC): ICD-10-CM

## 2019-07-02 DIAGNOSIS — R31.29 MICROSCOPIC HEMATURIA: ICD-10-CM

## 2019-07-02 DIAGNOSIS — J45.40 MODERATE PERSISTENT ASTHMA WITHOUT COMPLICATION: ICD-10-CM

## 2019-07-02 DIAGNOSIS — R91.8 PULMONARY NODULES: ICD-10-CM

## 2019-07-02 DIAGNOSIS — R73.9 HYPERGLYCEMIA: ICD-10-CM

## 2019-07-02 DIAGNOSIS — J96.11 CHRONIC RESPIRATORY FAILURE WITH HYPOXIA (HCC): ICD-10-CM

## 2019-07-11 ENCOUNTER — HOSPITAL ENCOUNTER (OUTPATIENT)
Dept: LAB | Facility: MEDICAL CENTER | Age: 79
End: 2019-07-11
Attending: FAMILY MEDICINE
Payer: MEDICARE

## 2019-07-11 DIAGNOSIS — R31.29 MICROSCOPIC HEMATURIA: ICD-10-CM

## 2019-07-11 DIAGNOSIS — R73.9 HYPERGLYCEMIA: ICD-10-CM

## 2019-07-11 LAB
APPEARANCE UR: ABNORMAL
BACTERIA #/AREA URNS HPF: NEGATIVE /HPF
BILIRUB UR QL STRIP.AUTO: NEGATIVE
CAOX CRY #/AREA URNS HPF: NORMAL /HPF
COLOR UR: ABNORMAL
EPI CELLS #/AREA URNS HPF: NEGATIVE /HPF
EST. AVERAGE GLUCOSE BLD GHB EST-MCNC: 120 MG/DL
GLUCOSE UR STRIP.AUTO-MCNC: NEGATIVE MG/DL
HBA1C MFR BLD: 5.8 % (ref 0–5.6)
HYALINE CASTS #/AREA URNS LPF: NORMAL /LPF
KETONES UR STRIP.AUTO-MCNC: NEGATIVE MG/DL
LEUKOCYTE ESTERASE UR QL STRIP.AUTO: NEGATIVE
MICRO URNS: ABNORMAL
NITRITE UR QL STRIP.AUTO: NEGATIVE
PH UR STRIP.AUTO: 7 [PH]
PROT UR QL STRIP: NEGATIVE MG/DL
RBC # URNS HPF: NORMAL /HPF
RBC UR QL AUTO: NEGATIVE
SP GR UR STRIP.AUTO: 1.02
UROBILINOGEN UR STRIP.AUTO-MCNC: 0.2 MG/DL
WBC #/AREA URNS HPF: NORMAL /HPF

## 2019-07-11 PROCEDURE — 81001 URINALYSIS AUTO W/SCOPE: CPT

## 2019-07-11 PROCEDURE — 83036 HEMOGLOBIN GLYCOSYLATED A1C: CPT | Mod: GA

## 2019-07-11 PROCEDURE — 36415 COLL VENOUS BLD VENIPUNCTURE: CPT | Mod: GA

## 2019-07-24 ENCOUNTER — APPOINTMENT (OUTPATIENT)
Dept: PULMONOLOGY | Facility: HOSPICE | Age: 79
End: 2019-07-24
Payer: MEDICARE

## 2019-07-30 ENCOUNTER — OFFICE VISIT (OUTPATIENT)
Dept: PHYSICAL MEDICINE AND REHAB | Facility: MEDICAL CENTER | Age: 79
End: 2019-07-30
Payer: MEDICARE

## 2019-07-30 VITALS
BODY MASS INDEX: 26.84 KG/M2 | WEIGHT: 157.19 LBS | SYSTOLIC BLOOD PRESSURE: 120 MMHG | DIASTOLIC BLOOD PRESSURE: 68 MMHG | TEMPERATURE: 98.2 F | HEIGHT: 64 IN | OXYGEN SATURATION: 94 % | HEART RATE: 73 BPM

## 2019-07-30 DIAGNOSIS — M79.7 FIBROMYALGIA: ICD-10-CM

## 2019-07-30 DIAGNOSIS — F32.A DEPRESSION, UNSPECIFIED DEPRESSION TYPE: ICD-10-CM

## 2019-07-30 DIAGNOSIS — M35.3 POLYMYALGIA RHEUMATICA (HCC): ICD-10-CM

## 2019-07-30 PROCEDURE — 99214 OFFICE O/P EST MOD 30 MIN: CPT | Performed by: PHYSICAL MEDICINE & REHABILITATION

## 2019-07-30 ASSESSMENT — PATIENT HEALTH QUESTIONNAIRE - PHQ9
CLINICAL INTERPRETATION OF PHQ2 SCORE: 1
5. POOR APPETITE OR OVEREATING: 2 - MORE THAN HALF THE DAYS
SUM OF ALL RESPONSES TO PHQ QUESTIONS 1-9: 5

## 2019-07-30 ASSESSMENT — PAIN SCALES - GENERAL: PAINLEVEL: 4=SLIGHT-MODERATE PAIN

## 2019-07-30 NOTE — PROGRESS NOTES
Follow-up patient note    Physiatry (physical medicine and  Rehabilitation), interventional spine and sports medicine    Date of Service: 07/30/2019    Chief complaint: Fibromyalgia    HISTORY    HPI: Magdi Pittman 78 y.o. female who presents today for evaluation of her fibromyalgia and after starting prednisone for suspected PMR.    Since the last visit, she has been feeling like she is doing a lot better.  She is not napping as much.  Only occasionally is she having a semi-down day now.    She has been eating more and thinks that this may be due to the prednisone.    Things around the house that she was ignoring, she is doing this now without significant increases in pain.  Her pain now is 4/10 on the VAS.    ORT: 8  PHQ-9: 14, today is a 5    Depression Screening    Interpretation of PHQ-9 Total Score   Score Severity   1-4 No Depression   5-9 Mild Depression   10-14 Moderate Depression   15-19 Moderately Severe Depression   20-27 Severe Depression      Medical records review:  I reviewed the note from the referring provider Nicole Holbrook M.D. dated 05/13/2019.  They have tried wellbutrin, cymbalta, celexa, lexapro, sertraline, amitriptyline without improvement.  She does see a counselor once a month.  They agreed on referral here.    Previous treatments:    Physical Therapy: Yes    Medications the patient is tried: Tylenol, up to three in 24hours.  She has tried wellbutrin, cymbalta, celexa, lexapro, sertraline and amitriptyline.    Previous interventions: none    Previous surgeries to relieve the above pain:  none      ROS: as noted in HPI, PMH or below.  No changes from 06/06/2019 except as HPI  Resp: Bronchitis, COPD exacerbation  Psych: Depression  Endo: Hypothyroidism  : recent UTI symptoms.    Red Flags ROS:   Fever, Chills, Sweats: Denies  Involuntary Weight Loss: Denies  Bladder Incontinence: Denies  Bowel Incontinence: denies  Saddle Anesthesia: Denies    All other systems reviewed and  negative.       PMHx:   Past Medical History:   Diagnosis Date   • Arthritis    • ASTHMA    • Depression    • H/O varicose vein ligation 3/26/2014   • History of breast cancer 3/26/2014   • Intention tremor 9/28/2016   • Lumbar disc disease 3/26/2014   • Migraines 3/26/2014   • Rheumatoid factor positive 3/26/2014    -CCP, Negative hepatitis panel     • Unspecified disorder of thyroid        PSHx:   Past Surgical History:   Procedure Laterality Date   • KNEE ARTHROSCOPY Right 2/26/2010    Procedure: WITH DEBRIDEMENT MEDIAL FEMORAL CONDYLE & PATELLA;  Surgeon: Gregorio Sloan M.D.;  Location: SURGERY [facility];  Service:    • MENISCECTOMY Right 2/26/2010    Procedure: PARTIAL LATERAL  ;  Surgeon: Gregorio Sloan M.D.;  Location: SURGERY [facility];  Service:    • BASAL CELL EXCISION  2009    left leg   • BREAST BIOPSY  1974    left breast   • TUBAL LIGATION  1973   • VEIN LIGATION  1966    bilateral legs   • TONSILLECTOMY  1958   • PB REMOVAL SYNOVIAL CYST,KNEE  1948    right knee       Family history   Family History   Problem Relation Age of Onset   • Cancer Mother         breast   • Lung Disease Father    • Cancer Sister         breast, pancreatic   • Cancer Brother    • Cancer Maternal Aunt    • Cancer Maternal Grandmother          Medications:   Current Outpatient Prescriptions   Medication   • Cholecalciferol (VITAMIND D3) 1000 UNIT Tab   • Milnacipran HCl 12.5 MG Tab   • predniSONE (DELTASONE) 10 MG Tab   • thyroid (ARMOUR THYROID) 60 MG Tab   • albuterol (PROAIR HFA) 108 (90 Base) MCG/ACT Aero Soln inhalation aerosol   • Acetaminophen (TYLENOL ARTHRITIS PAIN PO)   • Tiotropium Bromide Monohydrate (SPIRIVA RESPIMAT) 2.5 MCG/ACT Aero Soln   • Cyanocobalamin (VITAMIN B-12 PO)   • Multiple Vitamin (MULTIVITAMIN PO)   • benzonatate (TESSALON) 100 MG Cap   • Misc. Devices Misc   • triamterene-hctz (MAXZIDE-25/DYAZIDE) 37.5-25 MG Tab     No current facility-administered medications for this visit.   "      Allergies:   Allergies   Allergen Reactions   • Asa [Aspirin]      Does not take D/T asthma hx   • Quinine    • Sulfa Drugs Rash     Skin peeling       Social Hx:   Social History     Social History   • Marital status:      Spouse name: N/A   • Number of children: N/A   • Years of education: N/A     Occupational History   • Not on file.     Social History Main Topics   • Smoking status: Never Smoker   • Smokeless tobacco: Never Used   • Alcohol use 2.0 oz/week     4 Standard drinks or equivalent per week      Comment: 1 per week   • Drug use: No   • Sexual activity: Not Currently     Other Topics Concern   •  Service No   • Blood Transfusions No   • Caffeine Concern No   • Occupational Exposure No   • Hobby Hazards No   • Sleep Concern No   • Stress Concern Yes   • Weight Concern No   • Special Diet No   • Back Care No   • Exercise No   • Bike Helmet No   • Seat Belt Yes   • Self-Exams No     Social History Narrative   • No narrative on file         EXAMINATION     Physical Exam:   Vitals: /68 (BP Location: Right arm, Patient Position: Sitting, BP Cuff Size: Small adult)   Pulse 73   Temp 36.8 °C (98.2 °F) (Temporal)   Ht 1.626 m (5' 4\")   Wt 71.3 kg (157 lb 3 oz)   SpO2 94%     Constitutional:   Body Habitus: Body mass index is 26.98 kg/m².  Cooperation: Fully cooperates with exam  Appearance: Well-groomed, well-nourished, not disheveled, in no acute distress    Eyes: No scleral icterus,, no proptosis    ENT -no obvious auditory deficits, no facial droop     Skin -no rashes or lesions noted     Respiratory-  breathing comfortable on room air, no audible wheezing    Cardiovascular- capillary refills less than 2 seconds. No lower extremity edema is noted.     Psychiatric- alert and oriented ×3. Normal affect.     Gait - normal gait, no use of ambulatory device, nonantalgic. No loss of balance    Musculoskeletal -   Cervical spine   Inspection: No deformities of the skin over the " cervical spine. No rashes or lesions.    Thoracic/Lumbar Spine/Sacral Spine/Hips   Inspection: No evidence of atrophy in bilateral lower extremities throughout     There is note of collapse of the left ankle    Neuro     No focal motor deficits of the upper or lower extremities.          MEDICAL DECISION MAKING    Medical records review: see under HPI section.     DATA    Labs:   Lab Results   Component Value Date/Time    SODIUM 139 03/19/2019 02:08 PM    POTASSIUM 3.6 03/19/2019 02:08 PM    CHLORIDE 102 03/19/2019 02:08 PM    CO2 27 03/19/2019 02:08 PM    ANION 10.0 03/19/2019 02:08 PM    GLUCOSE 107 (H) 03/19/2019 02:08 PM    BUN 22 03/19/2019 02:08 PM    CREATININE 0.61 06/06/2019 02:50 PM    CALCIUM 9.6 03/19/2019 02:08 PM    ASTSGOT 27 03/08/2018 11:54 AM    ALTSGPT 31 03/08/2018 11:54 AM    TBILIRUBIN 0.5 03/08/2018 11:54 AM    ALBUMIN 4.4 03/08/2018 11:54 AM    TOTPROTEIN 7.4 03/08/2018 11:54 AM    GLOBULIN 3.0 03/08/2018 11:54 AM    AGRATIO 1.5 03/08/2018 11:54 AM     CRP: 03/8/2018: 4.1    No results found for: PROTHROMBTM, INR     Lab Results   Component Value Date/Time    WBC 6.6 03/08/2018 11:54 AM    RBC 4.31 03/08/2018 11:54 AM    HEMOGLOBIN 13.9 03/08/2018 11:54 AM    HEMATOCRIT 42.1 03/08/2018 11:54 AM    MCV 97.7 03/08/2018 11:54 AM    MCH 32.3 03/08/2018 11:54 AM    MCHC 33.0 (L) 03/08/2018 11:54 AM    MPV 9.4 03/08/2018 11:54 AM    NEUTSPOLYS 60.50 01/27/2017 12:05 PM    LYMPHOCYTES 18.70 (L) 01/27/2017 12:05 PM    MONOCYTES 11.60 01/27/2017 12:05 PM    EOSINOPHILS 7.90 (H) 01/27/2017 12:05 PM    BASOPHILS 1.10 01/27/2017 12:05 PM        Lab Results   Component Value Date/Time    HBA1C 5.8 (H) 07/11/2019 12:27 PM        Imaging: I personally reviewed following images, these are my reads  Xray cervical spine 06/06/2019:      Xray hips and pelvis 03/8/2018  Degenerative changes in the hips bilaterally.  Lumbar facet degenerative changes      IMAGING radiology reads. I reviewed the following  radiology reads   Xray cervical spine 06/06/2019  1.  No compression deformity or acute fracture.    2.  Moderate to severe degenerative disc disease and facet arthropathy.    3.  Anterolisthesis at the C6-C7 level is noted and likely related to facet arthropathy.    Xray hips with pelvis: 03/08/2018  No radiographic evidence of acute traumatic bone injury.    Symmetric degenerative changes about both hip joints.    Lumbosacral facet joint degenerative changes.                                                                                                                                           Results for orders placed during the hospital encounter of 01/27/17   DX-SHOULDER 2+ RIGHT    Impression Degenerative changes involving the glenohumeral and acromioclavicular joints.                     Diagnosis   Visit Diagnoses     ICD-10-CM   1. Polymyalgia rheumatica (HCC) M35.3   2. Fibromyalgia M79.7   3. Depression, unspecified depression type F32.9           ASSESSMENT:  Magdi Pittman 78 y.o. female with myofascial pain that could suggest fibromyalgia     Magdi was seen today for follow-up.    Diagnoses and all orders for this visit:    Polymyalgia rheumatica (HCC)  -     C-REACTIVE PROTEIN CARDIAC  -     WESTERGREN SED RATE; Future    Fibromyalgia  -     Milnacipran HCl 12.5 MG Tab; Take 1 Tab by mouth every day for 30 days.    Depression, unspecified depression type         1. Continue savella 12.5mg daily.  Her depression seems to be improving and pain is less.  2. Vitamin D3 800-1000IU  3. Continue prednisone at 10mg.   Rechecking ESR and CRP.  Plan to consider wean depending on results.  4. Follow-up with PCP, Dr. Holbrook as scheduled.      Follow-up: Return in about 4 weeks (around 8/27/2019).    Thank you very much for asking me to participate in Magdi Pittman's care.  Please contact me with any questions or concerns.      Please note that this dictation was created using voice recognition software. I  have made every reasonable attempt to correct obvious errors but there may be errors of grammar and content that I may have overlooked prior to finalization of this note.      Issa Stern MD  Physical Medicine and Rehabilitation  Interventional Spine and Sports Physiatry  [facility]

## 2019-08-01 ENCOUNTER — OFFICE VISIT (OUTPATIENT)
Dept: MEDICAL GROUP | Facility: MEDICAL CENTER | Age: 79
End: 2019-08-01
Payer: MEDICARE

## 2019-08-01 ENCOUNTER — HOSPITAL ENCOUNTER (OUTPATIENT)
Dept: LAB | Facility: MEDICAL CENTER | Age: 79
End: 2019-08-01
Attending: PHYSICAL MEDICINE & REHABILITATION
Payer: MEDICARE

## 2019-08-01 VITALS
TEMPERATURE: 97.7 F | BODY MASS INDEX: 27.14 KG/M2 | DIASTOLIC BLOOD PRESSURE: 60 MMHG | HEART RATE: 72 BPM | WEIGHT: 159 LBS | OXYGEN SATURATION: 96 % | HEIGHT: 64 IN | SYSTOLIC BLOOD PRESSURE: 118 MMHG

## 2019-08-01 DIAGNOSIS — M79.7 FIBROMYALGIA: ICD-10-CM

## 2019-08-01 DIAGNOSIS — M35.3 PMR (POLYMYALGIA RHEUMATICA) (HCC): ICD-10-CM

## 2019-08-01 DIAGNOSIS — Z79.52 CURRENT USE OF STEROID MEDICATION: ICD-10-CM

## 2019-08-01 DIAGNOSIS — R73.9 HYPERGLYCEMIA: ICD-10-CM

## 2019-08-01 DIAGNOSIS — Z78.0 MENOPAUSE: ICD-10-CM

## 2019-08-01 DIAGNOSIS — M35.3 POLYMYALGIA RHEUMATICA (HCC): ICD-10-CM

## 2019-08-01 LAB
CRP SERPL HS-MCNC: 2.3 MG/L (ref 0–7.5)
ERYTHROCYTE [SEDIMENTATION RATE] IN BLOOD BY WESTERGREN METHOD: 8 MM/HOUR (ref 0–30)

## 2019-08-01 PROCEDURE — 85652 RBC SED RATE AUTOMATED: CPT

## 2019-08-01 PROCEDURE — 36415 COLL VENOUS BLD VENIPUNCTURE: CPT

## 2019-08-01 PROCEDURE — 86141 C-REACTIVE PROTEIN HS: CPT | Mod: GZ

## 2019-08-01 PROCEDURE — 99213 OFFICE O/P EST LOW 20 MIN: CPT | Performed by: FAMILY MEDICINE

## 2019-08-01 NOTE — PROGRESS NOTES
cc:  fibromyalgia    Subjective:     Magdi Pittman is a 78 y.o. female presenting for follow-up of her fibromyalgia.  She has seen physiatry, she may also have PMR.  She is now on Savella and prednisone 10 mg daily.  She reports that her pain has significantly improved.  Is being more active.  She has gained some weight since her last visit, about 6 pounds.  She was initially eating quite poorly, eating a lot of sweets and desserts.  However, she has eliminated that.  She has also cut down on her Tylenol use, is down to 1-2 times a day at most.  Her last hemoglobin A1c was slightly elevated at 5.8 last month.  Denies any polydipsia, polyuria, weight loss      Review of systems:  See above.       Current Outpatient Medications:   •  Cholecalciferol (VITAMIND D3) 1000 UNIT Tab, Take 1,000 Units by mouth every day., Disp: , Rfl:   •  Milnacipran HCl 12.5 MG Tab, Take 1 Tab by mouth every day for 30 days., Disp: 30 Tab, Rfl: 1  •  predniSONE (DELTASONE) 10 MG Tab, Take 1 Tab by mouth every day for 30 days., Disp: 30 Tab, Rfl: 0  •  Misc. Devices Misc, 2L oxygen via nasal cannula at night while sleeping, Disp: 1 Units, Rfl: 0  •  thyroid (ARMOUR THYROID) 60 MG Tab, Take 1 Tab by mouth every day., Disp: 90 Tab, Rfl: 3  •  albuterol (PROAIR HFA) 108 (90 Base) MCG/ACT Aero Soln inhalation aerosol, Inhale 1-2 Puffs by mouth every 6 hours as needed for Shortness of Breath., Disp: 1 Inhaler, Rfl: 3  •  Acetaminophen (TYLENOL ARTHRITIS PAIN PO), Take 650 mg by mouth 2 Times a Day., Disp: , Rfl:   •  Tiotropium Bromide Monohydrate (SPIRIVA RESPIMAT) 2.5 MCG/ACT Aero Soln, Inhale 2 Puffs by mouth every day., Disp: 12 g, Rfl: 3  •  triamterene-hctz (MAXZIDE-25/DYAZIDE) 37.5-25 MG Tab, Take 1 Tab by mouth every day., Disp: 90 Tab, Rfl: 1  •  Cyanocobalamin (VITAMIN B-12 PO), Take  by mouth every day., Disp: , Rfl:   •  Multiple Vitamin (MULTIVITAMIN PO), Take 1 Tab by mouth every day., Disp: , Rfl:     Allergies, past medical  "history, past surgical history, family history, social history reviewed and updated    Objective:     Vitals: /60 (BP Location: Right arm, Patient Position: Sitting)   Pulse 72   Temp 36.5 °C (97.7 °F)   Ht 1.626 m (5' 4\")   Wt 72.1 kg (159 lb)   SpO2 96%   BMI 27.29 kg/m²   General: Alert, pleasant, NAD  HEENT: Normocephalic.    Heart: Regular rate and rhythm.  S1 and S2 normal.  No murmurs appreciated.  Respiratory: Normal respiratory effort.  Clear to auscultation bilaterally.  Abdomen: Non-distended, soft  Psych:  Affect/mood is normal, judgement is good, memory is intact, grooming is appropriate.    Assessment/Plan:     Magdi was seen today for hypothyroidism.    Diagnoses and all orders for this visit:    Fibromyalgia  PMR (polymyalgia rheumatica) (HCC)  Stable, improving.  Continues to see physiatry.    Current use of steroid medication  Stable, continue to monitor.  Discussed healthy diet and exercise.  We will continue to monitor blood sugars.  Bone scan also ordered  -     DS-BONE DENSITY STUDY (DEXA); Future    Hyperglycemia  Stable, continue to monitor    Menopause  -     DS-BONE DENSITY STUDY (DEXA); Future      Return in about 3 months (around 11/1/2019) for routine follow up.    "

## 2019-08-07 DIAGNOSIS — M35.3 POLYMYALGIA RHEUMATICA (HCC): ICD-10-CM

## 2019-08-07 RX ORDER — PREDNISONE 10 MG/1
10 TABLET ORAL DAILY
Qty: 30 TAB | Refills: 0 | Status: SHIPPED | OUTPATIENT
Start: 2019-08-10 | End: 2019-08-30

## 2019-08-15 ENCOUNTER — OFFICE VISIT (OUTPATIENT)
Dept: PULMONOLOGY | Facility: HOSPICE | Age: 79
End: 2019-08-15
Payer: MEDICARE

## 2019-08-15 VITALS
SYSTOLIC BLOOD PRESSURE: 132 MMHG | OXYGEN SATURATION: 93 % | BODY MASS INDEX: 27.49 KG/M2 | WEIGHT: 161 LBS | HEIGHT: 64 IN | HEART RATE: 69 BPM | RESPIRATION RATE: 16 BRPM | DIASTOLIC BLOOD PRESSURE: 68 MMHG

## 2019-08-15 DIAGNOSIS — J44.9 CHRONIC OBSTRUCTIVE PULMONARY DISEASE, UNSPECIFIED COPD TYPE (HCC): ICD-10-CM

## 2019-08-15 DIAGNOSIS — J96.11 CHRONIC RESPIRATORY FAILURE WITH HYPOXIA (HCC): ICD-10-CM

## 2019-08-15 DIAGNOSIS — R91.8 PULMONARY NODULES: ICD-10-CM

## 2019-08-15 PROCEDURE — 99204 OFFICE O/P NEW MOD 45 MIN: CPT | Performed by: INTERNAL MEDICINE

## 2019-08-15 SDOH — HEALTH STABILITY: MENTAL HEALTH: HOW MANY STANDARD DRINKS CONTAINING ALCOHOL DO YOU HAVE ON A TYPICAL DAY?: 1 OR 2

## 2019-08-15 SDOH — HEALTH STABILITY: MENTAL HEALTH: HOW OFTEN DO YOU HAVE A DRINK CONTAINING ALCOHOL?: 2-3 TIMES A WEEK

## 2019-08-15 NOTE — PROGRESS NOTES
"Chief Complaint   Patient presents with   • New Patient     Ref for Pulmonary Nodule       HPI: This patient is an 78 y.o. Female who is referred by Dr. Holbrook, PCP, for COPD and pulmonary nodules.  She is a lifelong non-smoker however had significant secondhand smoke exposures working in a [employer] for 30 years.  She was diagnosed with asthma at the age of 17 which was in remission up until the past decade.  Asthma triggers include environmental allergies, URIs and \"stress\".  She had been on ICS/LABA inhaler (Advair) however this was switched to Spiriva by Dr. Sauceda, [facility]'s pulmonologist, over the past 2 years.  Over the past month she was treated with oral steroids for suspected PMR.  She feels well currently and denies exertional dyspnea, cough, wheezing or chest tightness.  She did not notice any perceptible difference between using ICS/LABA versus Spiriva. Denies PRITESH use.  She would like to wean off steroids.  Pulmonary function testing show moderate COPD with FEV1 1.28 L or 66%, FEV1/FVC: 63%.  Diffusion capacity is normal at 114%.  No significant bronchodilator response noted.  Chest CAT scan June 6, 2019 showed subpleural nodules, all subcentimeter stable from 2017 consistent with benign etiology; additionally right lower lobe atelectasis noted.    Past Medical History:   Diagnosis Date   • Allergic rhinitis    • Arthritis    • ASTHMA    • Bronchitis    • COPD (chronic obstructive pulmonary disease) (HCC)    • Daytime sleepiness    • Depression    • H/O varicose vein ligation 3/26/2014   • History of breast cancer 3/26/2014   • Hyperthyroidism    • Intention tremor 9/28/2016   • Lumbar disc disease 3/26/2014   • Migraines 3/26/2014   • Obesity    • Pneumonia    • Pulmonary emphysema (HCC)    • Rheumatoid arthritis (HCC)    • Rheumatoid factor positive 3/26/2014    -CCP, Negative hepatitis panel     • Ringing in ears    • Shortness of breath    • Unspecified disorder of thyroid    • Wears glasses  "       Social History     Socioeconomic History   • Marital status:      Spouse name: Not on file   • Number of children: Not on file   • Years of education: Not on file   • Highest education level: Not on file   Occupational History   • Not on file   Social Needs   • Financial resource strain: Not on file   • Food insecurity:     Worry: Not on file     Inability: Not on file   • Transportation needs:     Medical: Not on file     Non-medical: Not on file   Tobacco Use   • Smoking status: Never Smoker   • Smokeless tobacco: Never Used   Substance and Sexual Activity   • Alcohol use: Yes     Alcohol/week: 0.6 - 1.2 oz     Types: 1 - 2 Glasses of wine per week     Frequency: 2-3 times a week     Drinks per session: 1 or 2     Comment: 1-2 per week   • Drug use: No   • Sexual activity: Not Currently   Lifestyle   • Physical activity:     Days per week: Not on file     Minutes per session: Not on file   • Stress: Not on file   Relationships   • Social connections:     Talks on phone: Not on file     Gets together: Not on file     Attends [employer] service: Not on file     Active member of club or organization: Not on file     Attends meetings of clubs or organizations: Not on file     Relationship status: Not on file   • Intimate partner violence:     Fear of current or ex partner: Not on file     Emotionally abused: Not on file     Physically abused: Not on file     Forced sexual activity: Not on file   Other Topics Concern   •  Service No   • Blood Transfusions No   • Caffeine Concern No   • Occupational Exposure No   • Hobby Hazards No   • Sleep Concern No   • Stress Concern Yes   • Weight Concern No   • Special Diet No   • Back Care No   • Exercise No   • Bike Helmet No   • Seat Belt Yes   • Self-Exams No   Social History Narrative   • Not on file       Family History   Problem Relation Age of Onset   • Cancer Mother         breast   • Lung Disease Father    • Cancer Sister         breast, pancreatic    • Cancer Brother    • Cancer Maternal Aunt    • Cancer Maternal Grandmother        Current Outpatient Medications on File Prior to Visit   Medication Sig Dispense Refill   • predniSONE (DELTASONE) 10 MG Tab Take 1 Tab by mouth every day for 30 days. 30 Tab 0   • Cholecalciferol (VITAMIND D3) 1000 UNIT Tab Take 1,000 Units by mouth every day.     • Milnacipran HCl 12.5 MG Tab Take 1 Tab by mouth every day for 30 days. 30 Tab 1   • Misc. Devices Misc 2L oxygen via nasal cannula at night while sleeping 1 Units 0   • thyroid (ARMOUR THYROID) 60 MG Tab Take 1 Tab by mouth every day. 90 Tab 3   • albuterol (PROAIR HFA) 108 (90 Base) MCG/ACT Aero Soln inhalation aerosol Inhale 1-2 Puffs by mouth every 6 hours as needed for Shortness of Breath. 1 Inhaler 3   • Acetaminophen (TYLENOL ARTHRITIS PAIN PO) Take 650 mg by mouth 2 Times a Day.     • Tiotropium Bromide Monohydrate (SPIRIVA RESPIMAT) 2.5 MCG/ACT Aero Soln Inhale 2 Puffs by mouth every day. 12 g 3   • triamterene-hctz (MAXZIDE-25/DYAZIDE) 37.5-25 MG Tab Take 1 Tab by mouth every day. 90 Tab 1   • Cyanocobalamin (VITAMIN B-12 PO) Take  by mouth every day.     • Multiple Vitamin (MULTIVITAMIN PO) Take 1 Tab by mouth every day.       No current facility-administered medications on file prior to visit.        Allergies: Asa [aspirin]; Quinine; and Sulfa drugs    ROS:   Constitutional: Denies fevers, chills, night sweats, fatigue or weight loss  Eyes: Denies vision loss, pain, drainage, double vision  Ears, Nose, Throat: Denies earache, difficulty hearing, +tinnitus, denies nasal congestion, hoarseness  Cardiovascular: Denies chest pain, tightness, palpitations, orthopnea or edema  Respiratory: As in HPI  Sleep: +daytime sleepiness, denies snoring, apneas, insomnia, morning headaches  GI: Denies heartburn, dysphagia, nausea, abdominal pain, diarrhea or constipation  : Denies frequent urination, hematuria, discharge or painful urination  Musculoskeletal: Denies back  "pain, painful joints, sore muscles  Neurological: Denies weakness or headaches  Skin: No rashes    /68 (BP Location: Left arm, Patient Position: Sitting, BP Cuff Size: Adult)   Pulse 69   Resp 16   Ht 1.626 m (5' 4\")   Wt 73 kg (161 lb)   SpO2 93%     Physical Exam:  Appearance: Well-nourished, well-developed, in no acute distress  HEENT: Normocephalic, atraumatic, white sclera, PERRLA, oropharynx clear  Neck: No adenopathy or masses  Respiratory: no intercostal retractions or accessory muscle use  Lungs auscultation: Clear to auscultation bilaterally  Cardiovascular: Regular rate rhythm. No murmurs, rubs or gallops.  No LE edema  Abdomen: soft, nondistended  Gait: Normal  Digits: No clubbing, cyanosis  Motor: No focal deficits  Orientation: Oriented to time, person and place    Diagnosis:  1. Chronic obstructive pulmonary disease, unspecified COPD type (HCC)     2. Chronic respiratory failure with hypoxia (HCC)     3. Pulmonary nodules         Plan:  The patient has moderately severe COPD, clinically stable on Spiriva inhaler.  Although she is a lifelong non-smoker, she has had significant secondhand smoke exposures and pulmonary function show chronic obstruction.  Encouraged continuing Spiriva Respimat 2 puff QD.  Recommend annual influenza vaccine.  Chest CAT scan shows no acute process.  Her pulmonary nodules are felt benign.  No further surveillance is required.  Follow-up with PCP for tapering of oral steroids for the myalgias/PMR.  Return in about 6 months (around 2/15/2020).      "

## 2019-08-19 ENCOUNTER — TELEPHONE (OUTPATIENT)
Dept: MEDICAL GROUP | Facility: MEDICAL CENTER | Age: 79
End: 2019-08-19

## 2019-08-19 NOTE — TELEPHONE ENCOUNTER
I am not sure what permanent make-up is.  I would recommend discussing with the provider that is doing it for her

## 2019-08-19 NOTE — TELEPHONE ENCOUNTER
PT is asking to clarify because the provider doing the procedure told her to check since she is on prednisolone. She is having Microblading done to her eyebrows PT states this is a usha permanete  type of tatoo .    Please advise

## 2019-08-19 NOTE — TELEPHONE ENCOUNTER
Magdi Pittman called re       Patient is enquiring about getting permanent makeup , she is on prednisone and would like to know if this is ok    [phone]

## 2019-08-30 ENCOUNTER — OFFICE VISIT (OUTPATIENT)
Dept: PHYSICAL MEDICINE AND REHAB | Facility: MEDICAL CENTER | Age: 79
End: 2019-08-30
Payer: MEDICARE

## 2019-08-30 VITALS
OXYGEN SATURATION: 91 % | HEART RATE: 93 BPM | HEIGHT: 64 IN | BODY MASS INDEX: 27.78 KG/M2 | TEMPERATURE: 97 F | SYSTOLIC BLOOD PRESSURE: 138 MMHG | DIASTOLIC BLOOD PRESSURE: 64 MMHG | WEIGHT: 162.7 LBS

## 2019-08-30 DIAGNOSIS — M19.041 PRIMARY OSTEOARTHRITIS OF BOTH HANDS: ICD-10-CM

## 2019-08-30 DIAGNOSIS — M19.042 PRIMARY OSTEOARTHRITIS OF BOTH HANDS: ICD-10-CM

## 2019-08-30 DIAGNOSIS — F32.A DEPRESSION, UNSPECIFIED DEPRESSION TYPE: ICD-10-CM

## 2019-08-30 DIAGNOSIS — M35.3 POLYMYALGIA RHEUMATICA (HCC): ICD-10-CM

## 2019-08-30 DIAGNOSIS — E55.9 VITAMIN D DEFICIENCY: ICD-10-CM

## 2019-08-30 DIAGNOSIS — M79.7 FIBROMYALGIA: ICD-10-CM

## 2019-08-30 PROCEDURE — 99214 OFFICE O/P EST MOD 30 MIN: CPT | Performed by: PHYSICAL MEDICINE & REHABILITATION

## 2019-08-30 RX ORDER — PREDNISONE 5 MG/1
TABLET ORAL
Qty: 28 TAB | Refills: 0 | Status: SHIPPED | OUTPATIENT
Start: 2019-08-30 | End: 2019-09-27 | Stop reason: SDUPTHER

## 2019-08-30 RX ORDER — PREDNISONE 1 MG/1
TABLET ORAL
Qty: 70 TAB | Refills: 0 | Status: SHIPPED | OUTPATIENT
Start: 2019-08-30 | End: 2019-09-27 | Stop reason: SDUPTHER

## 2019-08-30 ASSESSMENT — PAIN SCALES - GENERAL: PAINLEVEL: 6=MODERATE PAIN

## 2019-08-30 ASSESSMENT — PATIENT HEALTH QUESTIONNAIRE - PHQ9
CLINICAL INTERPRETATION OF PHQ2 SCORE: 2
5. POOR APPETITE OR OVEREATING: 3 - NEARLY EVERY DAY
SUM OF ALL RESPONSES TO PHQ QUESTIONS 1-9: 9

## 2019-08-30 NOTE — PROGRESS NOTES
Follow-up patient note    Physiatry (physical medicine and  Rehabilitation), interventional spine and sports medicine    Date of Service: 08/30/2019    Chief complaint: Fibromyalgia and PMR    HISTORY    HPI: Magdi Pittman 78 y.o. female who presents today for evaluation of her fibromyalgia and after starting prednisone for PMR.    For the last two weeks, she has been more irritable and feels like she is not sleeping as well.  Her dog is part of the problem as the dog is having a hard time sleeping, also.    She has gained some weight, but she does not feel like it is related to increased food intake.    Her tremors are worsened, she thinks.      Overall, her pain feels worse in shoulders, elbows, thighs and legs.  It is an 8/10.  Laying the recliner or in bed is comfortable, but otherwise has trouble getting comfortable.    ORT: 8  PHQ-9: 14, today is a 9    Depression Screening    Interpretation of PHQ-9 Total Score   Score Severity   1-4 No Depression   5-9 Mild Depression   10-14 Moderate Depression   15-19 Moderately Severe Depression   20-27 Severe Depression      Medical records review:  I reviewed the note from the referring provider Nicole Holbrook M.D. dated 05/13/2019.  They have tried wellbutrin, cymbalta, celexa, lexapro, sertraline, amitriptyline without improvement.  She does see a counselor once a month.  They agreed on referral here.    Previous treatments:    Physical Therapy: Yes    Medications the patient is tried: Tylenol, up to three in 24hours.  She has tried wellbutrin, cymbalta, celexa, lexapro, sertraline and amitriptyline.    Previous interventions: none    Previous surgeries to relieve the above pain:  none      ROS: as noted in HPI, PMH or below.  No changes from 07/30/2019 except as HPI  Resp: Bronchitis, COPD exacerbation  Psych: Depression  Endo: Hypothyroidism  : recent UTI symptoms.    Red Flags ROS:   Fever, Chills, Sweats: Denies  Involuntary Weight Loss: Denies  Bladder  Incontinence: Denies  Bowel Incontinence: denies  Saddle Anesthesia: Denies    All other systems reviewed and negative.       PMHx:   Past Medical History:   Diagnosis Date   • Allergic rhinitis    • Arthritis    • ASTHMA    • Bronchitis    • COPD (chronic obstructive pulmonary disease) (HCC)    • Daytime sleepiness    • Depression    • H/O varicose vein ligation 3/26/2014   • History of breast cancer 3/26/2014   • Hyperthyroidism    • Intention tremor 9/28/2016   • Lumbar disc disease 3/26/2014   • Migraines 3/26/2014   • Obesity    • Pneumonia    • Pulmonary emphysema (HCC)    • Rheumatoid arthritis (HCC)    • Rheumatoid factor positive 3/26/2014    -CCP, Negative hepatitis panel     • Ringing in ears    • Shortness of breath    • Unspecified disorder of thyroid    • Wears glasses        PSHx:   Past Surgical History:   Procedure Laterality Date   • KNEE ARTHROSCOPY Right 2/26/2010    Procedure: WITH DEBRIDEMENT MEDIAL FEMORAL CONDYLE & PATELLA;  Surgeon: Gregorio Sloan M.D.;  Location: SURGERY [facility];  Service:    • MENISCECTOMY Right 2/26/2010    Procedure: PARTIAL LATERAL  ;  Surgeon: Gregorio Sloan M.D.;  Location: SURGERY [facility];  Service:    • BASAL CELL EXCISION  2009    left leg   • BREAST BIOPSY  1974    left breast   • TUBAL LIGATION  1973   • VEIN LIGATION  1966    bilateral legs   • TONSILLECTOMY  1958   • PB REMOVAL SYNOVIAL CYST,KNEE  1948    right knee       Family history   Family History   Problem Relation Age of Onset   • Cancer Mother         breast   • Lung Disease Father    • Cancer Sister         breast, pancreatic   • Cancer Brother    • Cancer Maternal Aunt    • Cancer Maternal Grandmother          Medications:   Current Outpatient Medications   Medication   • Milnacipran HCl 12.5 MG Tab   • predniSONE (DELTASONE) 5 MG Tab   • predniSONE (DELTASONE) 1 MG Tab   • Cholecalciferol (VITAMIND D3) 1000 UNIT Tab   • Misc. Devices Misc   • thyroid (ARMOUR THYROID) 60 MG Tab    • albuterol (PROAIR HFA) 108 (90 Base) MCG/ACT Aero Soln inhalation aerosol   • Acetaminophen (TYLENOL ARTHRITIS PAIN PO)   • Tiotropium Bromide Monohydrate (SPIRIVA RESPIMAT) 2.5 MCG/ACT Aero Soln   • triamterene-hctz (MAXZIDE-25/DYAZIDE) 37.5-25 MG Tab   • Cyanocobalamin (VITAMIN B-12 PO)   • Multiple Vitamin (MULTIVITAMIN PO)     No current facility-administered medications for this visit.        Allergies:   Allergies   Allergen Reactions   • Asa [Aspirin]      Does not take D/T asthma hx   • Quinine    • Sulfa Drugs Rash     Skin peeling       Social Hx:   Social History     Socioeconomic History   • Marital status:      Spouse name: Not on file   • Number of children: Not on file   • Years of education: Not on file   • Highest education level: Not on file   Occupational History   • Not on file   Social Needs   • Financial resource strain: Not on file   • Food insecurity:     Worry: Not on file     Inability: Not on file   • Transportation needs:     Medical: Not on file     Non-medical: Not on file   Tobacco Use   • Smoking status: Never Smoker   • Smokeless tobacco: Never Used   Substance and Sexual Activity   • Alcohol use: Yes     Alcohol/week: 0.6 - 1.2 oz     Types: 1 - 2 Glasses of wine per week     Frequency: 2-3 times a week     Drinks per session: 1 or 2     Comment: 1-2 per week   • Drug use: No   • Sexual activity: Not Currently   Lifestyle   • Physical activity:     Days per week: Not on file     Minutes per session: Not on file   • Stress: Not on file   Relationships   • Social connections:     Talks on phone: Not on file     Gets together: Not on file     Attends Orthodoxy service: Not on file     Active member of club or organization: Not on file     Attends meetings of clubs or organizations: Not on file     Relationship status: Not on file   • Intimate partner violence:     Fear of current or ex partner: Not on file     Emotionally abused: Not on file     Physically abused: Not on  "file     Forced sexual activity: Not on file   Other Topics Concern   •  Service No   • Blood Transfusions No   • Caffeine Concern No   • Occupational Exposure No   • Hobby Hazards No   • Sleep Concern No   • Stress Concern Yes   • Weight Concern No   • Special Diet No   • Back Care No   • Exercise No   • Bike Helmet No   • Seat Belt Yes   • Self-Exams No   Social History Narrative   • Not on file         EXAMINATION     Physical Exam:   Vitals: /64 (BP Location: Left arm, Patient Position: Sitting, BP Cuff Size: Large adult long)   Pulse 93   Temp 36.1 °C (97 °F) (Temporal)   Ht 1.626 m (5' 4\")   Wt 73.8 kg (162 lb 11.2 oz)   SpO2 91%     Constitutional:   Body Habitus: Body mass index is 27.93 kg/m².  Cooperation: Fully cooperates with exam  Appearance: Well-groomed, well-nourished, not disheveled, in no acute distress    Eyes: No scleral icterus, no proptosis    ENT -no obvious auditory deficits, no facial droop     Skin -no rashes or lesions noted     Respiratory-  breathing comfortable on room air, no audible wheezing    Cardiovascular- No lower extremity edema is noted.     Psychiatric- alert and oriented ×3. Normal affect.     Gait - normal gait, no use of ambulatory device, no loss of balance    Musculoskeletal -     Thoracic/Lumbar Spine/Sacral Spine/Hips   Inspection: No evidence of atrophy in bilateral lower extremities throughout     There is note of collapse of the left ankle    Neuro     No focal motor deficits of the upper or lower extremities.          MEDICAL DECISION MAKING    Medical records review: see under HPI section.     DATA    Labs:   08/01/2019 CRP 2.3  08/01/2019 ESR 8, was 31  Lab Results   Component Value Date/Time    SODIUM 139 03/19/2019 02:08 PM    POTASSIUM 3.6 03/19/2019 02:08 PM    CHLORIDE 102 03/19/2019 02:08 PM    CO2 27 03/19/2019 02:08 PM    ANION 10.0 03/19/2019 02:08 PM    GLUCOSE 107 (H) 03/19/2019 02:08 PM    BUN 22 03/19/2019 02:08 PM    CREATININE " 0.61 06/06/2019 02:50 PM    CALCIUM 9.6 03/19/2019 02:08 PM    ASTSGOT 27 03/08/2018 11:54 AM    ALTSGPT 31 03/08/2018 11:54 AM    TBILIRUBIN 0.5 03/08/2018 11:54 AM    ALBUMIN 4.4 03/08/2018 11:54 AM    TOTPROTEIN 7.4 03/08/2018 11:54 AM    GLOBULIN 3.0 03/08/2018 11:54 AM    AGRATIO 1.5 03/08/2018 11:54 AM     CRP: 03/8/2018: 4.1    No results found for: PROTHROMBTM, INR     Lab Results   Component Value Date/Time    WBC 6.6 03/08/2018 11:54 AM    RBC 4.31 03/08/2018 11:54 AM    HEMOGLOBIN 13.9 03/08/2018 11:54 AM    HEMATOCRIT 42.1 03/08/2018 11:54 AM    MCV 97.7 03/08/2018 11:54 AM    MCH 32.3 03/08/2018 11:54 AM    MCHC 33.0 (L) 03/08/2018 11:54 AM    MPV 9.4 03/08/2018 11:54 AM    NEUTSPOLYS 60.50 01/27/2017 12:05 PM    LYMPHOCYTES 18.70 (L) 01/27/2017 12:05 PM    MONOCYTES 11.60 01/27/2017 12:05 PM    EOSINOPHILS 7.90 (H) 01/27/2017 12:05 PM    BASOPHILS 1.10 01/27/2017 12:05 PM        Lab Results   Component Value Date/Time    HBA1C 5.8 (H) 07/11/2019 12:27 PM        Imaging: I personally reviewed following images, these are my reads  Xray cervical spine 06/06/2019:      Xray hips and pelvis 03/8/2018  Degenerative changes in the hips bilaterally.  Lumbar facet degenerative changes      IMAGING radiology reads. I reviewed the following radiology reads   Xray cervical spine 06/06/2019  1.  No compression deformity or acute fracture.    2.  Moderate to severe degenerative disc disease and facet arthropathy.    3.  Anterolisthesis at the C6-C7 level is noted and likely related to facet arthropathy.    Xray hips with pelvis: 03/08/2018  No radiographic evidence of acute traumatic bone injury.    Symmetric degenerative changes about both hip joints.    Lumbosacral facet joint degenerative changes.                                                                                                                                           Results for orders placed during the hospital encounter of 01/27/17   JORDANA-SHOULDER  2+ RIGHT    Impression Degenerative changes involving the glenohumeral and acromioclavicular joints.                     Diagnosis   Visit Diagnoses     ICD-10-CM   1. Fibromyalgia M79.7   2. Polymyalgia rheumatica (HCC) M35.3   3. Primary osteoarthritis of both hands M19.041    M19.042   4. Vitamin D deficiency E55.9   5. Depression, unspecified depression type F32.9           ASSESSMENT:  Magdi Pittman 78 y.o. female with myofascial pain that could suggest fibromyalgia     Magdi was seen today for follow-up.    Diagnoses and all orders for this visit:    Fibromyalgia  -     Milnacipran HCl 12.5 MG Tab; Take 1 Tab by mouth every day for 30 days.    Polymyalgia rheumatica (HCC)  -     predniSONE (DELTASONE) 5 MG Tab; Take 9mg for 7days, then take 8mg for 7days, then 7mg for 7 days, then take 6mg for 7 days Combine with 5mg pills for totals  -     predniSONE (DELTASONE) 1 MG Tab; Take    Primary osteoarthritis of both hands    Vitamin D deficiency    Depression, unspecified depression type         1. Continue savella 12.5mg daily.  Discussed possible increase, but she would like to hold off on this for now.  2. Vitamin D3 800-1000IU  3. Continue prednisone, but she is starting to have side effects.  Given improvement in labs, it is not clear that her current complaints are related to PMR.  Discussed starting a wean to see if her symptoms change.  Will reassess when she is at 5mg daily.  She will let me know if her symptoms worsen  4. Discussed possible consultation with Neurology re: tremors.    5. Follow-up with PCP, Dr. Holbrook as scheduled.      Follow-up: No follow-ups on file.    Thank you very much for asking me to participate in Magdi Pittman's care.  Please contact me with any questions or concerns.      Please note that this dictation was created using voice recognition software. I have made every reasonable attempt to correct obvious errors but there may be errors of grammar and content that I may have  overlooked prior to finalization of this note.      Issa Stern MD  Physical Medicine and Rehabilitation  Interventional Spine and Sports Physiatry  [facility]

## 2019-09-03 ENCOUNTER — APPOINTMENT (OUTPATIENT)
Dept: RADIOLOGY | Facility: MEDICAL CENTER | Age: 79
End: 2019-09-03
Attending: FAMILY MEDICINE
Payer: MEDICARE

## 2019-09-26 ENCOUNTER — APPOINTMENT (OUTPATIENT)
Dept: RADIOLOGY | Facility: MEDICAL CENTER | Age: 79
End: 2019-09-26
Attending: FAMILY MEDICINE
Payer: MEDICARE

## 2019-09-27 ENCOUNTER — OFFICE VISIT (OUTPATIENT)
Dept: PHYSICAL MEDICINE AND REHAB | Facility: MEDICAL CENTER | Age: 79
End: 2019-09-27
Payer: MEDICARE

## 2019-09-27 VITALS
DIASTOLIC BLOOD PRESSURE: 86 MMHG | TEMPERATURE: 97.9 F | BODY MASS INDEX: 28.38 KG/M2 | WEIGHT: 166.23 LBS | HEART RATE: 86 BPM | OXYGEN SATURATION: 94 % | HEIGHT: 64 IN | SYSTOLIC BLOOD PRESSURE: 124 MMHG

## 2019-09-27 DIAGNOSIS — M19.041 PRIMARY OSTEOARTHRITIS OF BOTH HANDS: ICD-10-CM

## 2019-09-27 DIAGNOSIS — M19.042 PRIMARY OSTEOARTHRITIS OF BOTH HANDS: ICD-10-CM

## 2019-09-27 DIAGNOSIS — G25.0 BENIGN ESSENTIAL TREMOR: ICD-10-CM

## 2019-09-27 DIAGNOSIS — M79.7 FIBROMYALGIA: ICD-10-CM

## 2019-09-27 DIAGNOSIS — M35.3 POLYMYALGIA RHEUMATICA (HCC): ICD-10-CM

## 2019-09-27 PROCEDURE — 99214 OFFICE O/P EST MOD 30 MIN: CPT | Performed by: PHYSICAL MEDICINE & REHABILITATION

## 2019-09-27 RX ORDER — PREDNISONE 5 MG/1
5 TABLET ORAL DAILY
Qty: 14 TAB | Refills: 0 | Status: SHIPPED | OUTPATIENT
Start: 2019-09-27 | End: 2019-10-11

## 2019-09-27 RX ORDER — PREDNISONE 1 MG/1
TABLET ORAL
Qty: 98 TAB | Refills: 0 | Status: SHIPPED | OUTPATIENT
Start: 2019-10-11 | End: 2019-11-08

## 2019-09-27 ASSESSMENT — PATIENT HEALTH QUESTIONNAIRE - PHQ9
5. POOR APPETITE OR OVEREATING: 1 - SEVERAL DAYS
CLINICAL INTERPRETATION OF PHQ2 SCORE: 1
SUM OF ALL RESPONSES TO PHQ QUESTIONS 1-9: 3

## 2019-09-27 ASSESSMENT — PAIN SCALES - GENERAL: PAINLEVEL: 6=MODERATE PAIN

## 2019-09-27 NOTE — PROGRESS NOTES
Follow-up patient note    Physiatry (physical medicine and  Rehabilitation), interventional spine and sports medicine    Date of Service: 09/27/2019    Chief complaint: Fibromyalgia and PMR    HISTORY    HPI: Magdi Pittman 78 y.o. female who presents today for evaluation of her fibromyalgia and after starting prednisone for PMR.    Magdi reports that her pain has stayed about the same with the reduction of prednisone.  She is taking 6mg daily now.    Her tremors are worsened, she thinks.  This is really bothering her as she needs help for things like writing checks as her handwriting is so bad.    Overall, her pain is still aching in her shoulders elbows, thighs and legs.  It is an 6/10.  Laying the recliner or in bed is most comfortable.    ORT: 8  PHQ-9: 14, previously, now 3    Depression Screening    Interpretation of PHQ-9 Total Score   Score Severity   1-4 No Depression   5-9 Mild Depression   10-14 Moderate Depression   15-19 Moderately Severe Depression   20-27 Severe Depression      Medical records review:  I reviewed the note from the referring provider Nicole Holbrook M.D. dated 05/13/2019.  They have tried wellbutrin, cymbalta, celexa, lexapro, sertraline, amitriptyline without improvement.  She does see a counselor once a month.  They agreed on referral here.    Previous treatments:    Physical Therapy: Yes    Medications the patient is tried: Tylenol, up to three in 24hours.  She has tried wellbutrin, cymbalta, celexa, lexapro, sertraline and amitriptyline.    Previous interventions: none    Previous surgeries to relieve the above pain:  none      ROS: as noted in HPI, PMH or below.  No changes from 08/30/2019 except as HPI  Resp: Bronchitis, COPD exacerbation  Psych: Depression  Endo: Hypothyroidism  : recent UTI symptoms.    Red Flags ROS:   Fever, Chills, Sweats: Denies  Involuntary Weight Loss: Denies  Bladder Incontinence: Denies  Bowel Incontinence: denies  Saddle Anesthesia:  Denies    All other systems reviewed and negative.       PMHx:   Past Medical History:   Diagnosis Date   • Allergic rhinitis    • Arthritis    • ASTHMA    • Bronchitis    • COPD (chronic obstructive pulmonary disease) (HCC)    • Daytime sleepiness    • Depression    • H/O varicose vein ligation 3/26/2014   • History of breast cancer 3/26/2014   • Hyperthyroidism    • Intention tremor 9/28/2016   • Lumbar disc disease 3/26/2014   • Migraines 3/26/2014   • Obesity    • Pneumonia    • Pulmonary emphysema (HCC)    • Rheumatoid arthritis (HCC)    • Rheumatoid factor positive 3/26/2014    -CCP, Negative hepatitis panel     • Ringing in ears    • Shortness of breath    • Unspecified disorder of thyroid    • Wears glasses        PSHx:   Past Surgical History:   Procedure Laterality Date   • KNEE ARTHROSCOPY Right 2/26/2010    Procedure: WITH DEBRIDEMENT MEDIAL FEMORAL CONDYLE & PATELLA;  Surgeon: Gregorio Sloan M.D.;  Location: [facility];  Service:    • MENISCECTOMY Right 2/26/2010    Procedure: PARTIAL LATERAL  ;  Surgeon: Gregorio Sloan M.D.;  Location: SURGERY [facility];  Service:    • BASAL CELL EXCISION  2009    left leg   • BREAST BIOPSY  1974    left breast   • TUBAL LIGATION  1973   • VEIN LIGATION  1966    bilateral legs   • TONSILLECTOMY  1958   • PB REMOVAL SYNOVIAL CYST,KNEE  1948    right knee       Family history   Family History   Problem Relation Age of Onset   • Cancer Mother         breast   • Lung Disease Father    • Cancer Sister         breast, pancreatic   • Cancer Brother    • Cancer Maternal Aunt    • Cancer Maternal Grandmother          Medications:   Current Outpatient Medications   Medication   • predniSONE (DELTASONE) 5 MG Tab   • [START ON 10/11/2019] predniSONE (DELTASONE) 1 MG Tab   • Milnacipran HCl 12.5 MG Tab   • Cholecalciferol (VITAMIND D3) 1000 UNIT Tab   • thyroid (ARMOUR THYROID) 60 MG Tab   • albuterol (PROAIR HFA) 108 (90 Base) MCG/ACT Aero Soln inhalation  aerosol   • Acetaminophen (TYLENOL ARTHRITIS PAIN PO)   • Tiotropium Bromide Monohydrate (SPIRIVA RESPIMAT) 2.5 MCG/ACT Aero Soln   • triamterene-hctz (MAXZIDE-25/DYAZIDE) 37.5-25 MG Tab   • Cyanocobalamin (VITAMIN B-12 PO)   • Multiple Vitamin (MULTIVITAMIN PO)   • Misc. Devices Misc     No current facility-administered medications for this visit.        Allergies:   Allergies   Allergen Reactions   • Asa [Aspirin]      Does not take D/T asthma hx   • Quinine    • Sulfa Drugs Rash     Skin peeling       Social Hx:   Social History     Socioeconomic History   • Marital status:      Spouse name: Not on file   • Number of children: Not on file   • Years of education: Not on file   • Highest education level: Not on file   Occupational History   • Not on file   Social Needs   • Financial resource strain: Not on file   • Food insecurity:     Worry: Not on file     Inability: Not on file   • Transportation needs:     Medical: Not on file     Non-medical: Not on file   Tobacco Use   • Smoking status: Never Smoker   • Smokeless tobacco: Never Used   Substance and Sexual Activity   • Alcohol use: Yes     Alcohol/week: 0.6 - 1.2 oz     Types: 1 - 2 Glasses of wine per week     Frequency: 2-3 times a week     Drinks per session: 1 or 2     Comment: 1-2 per week   • Drug use: No   • Sexual activity: Not Currently   Lifestyle   • Physical activity:     Days per week: Not on file     Minutes per session: Not on file   • Stress: Not on file   Relationships   • Social connections:     Talks on phone: Not on file     Gets together: Not on file     Attends Religion service: Not on file     Active member of club or organization: Not on file     Attends meetings of clubs or organizations: Not on file     Relationship status: Not on file   • Intimate partner violence:     Fear of current or ex partner: Not on file     Emotionally abused: Not on file     Physically abused: Not on file     Forced sexual activity: Not on file  "  Other Topics Concern   •  Service No   • Blood Transfusions No   • Caffeine Concern No   • Occupational Exposure No   • Hobby Hazards No   • Sleep Concern No   • Stress Concern Yes   • Weight Concern No   • Special Diet No   • Back Care No   • Exercise No   • Bike Helmet No   • Seat Belt Yes   • Self-Exams No   Social History Narrative   • Not on file         EXAMINATION     Physical Exam:   Vitals: /86 (BP Location: Right arm, Patient Position: Sitting, BP Cuff Size: Small adult)   Pulse 86   Temp 36.6 °C (97.9 °F) (Temporal)   Ht 1.626 m (5' 4\")   Wt 75.4 kg (166 lb 3.6 oz)   SpO2 94%     Constitutional:   Body Habitus: Body mass index is 28.53 kg/m².  Cooperation: Fully cooperates with exam  Appearance: Well-groomed, well-nourished, not disheveled, in no acute distress    Tremor noted    Eyes: No scleral icterus, no proptosis    ENT -no obvious auditory deficits, no facial droop     Skin -no rashes or lesions noted     Respiratory-  breathing comfortable on room air, no audible wheezing     Psychiatric- alert and oriented ×3. Normal affect.     Gait - normal gait, no use of ambulatory device, no loss of balance        MEDICAL DECISION MAKING    Medical records review: see under HPI section.     DATA    Labs:   08/01/2019 CRP 2.3  08/01/2019 ESR 8, was 31  Lab Results   Component Value Date/Time    SODIUM 139 03/19/2019 02:08 PM    POTASSIUM 3.6 03/19/2019 02:08 PM    CHLORIDE 102 03/19/2019 02:08 PM    CO2 27 03/19/2019 02:08 PM    ANION 10.0 03/19/2019 02:08 PM    GLUCOSE 107 (H) 03/19/2019 02:08 PM    BUN 22 03/19/2019 02:08 PM    CREATININE 0.61 06/06/2019 02:50 PM    CALCIUM 9.6 03/19/2019 02:08 PM    ASTSGOT 27 03/08/2018 11:54 AM    ALTSGPT 31 03/08/2018 11:54 AM    TBILIRUBIN 0.5 03/08/2018 11:54 AM    ALBUMIN 4.4 03/08/2018 11:54 AM    TOTPROTEIN 7.4 03/08/2018 11:54 AM    GLOBULIN 3.0 03/08/2018 11:54 AM    AGRATIO 1.5 03/08/2018 11:54 AM     CRP: 03/8/2018: 4.1    No results found " for: PROTHROMBTM, INR     Lab Results   Component Value Date/Time    WBC 6.6 03/08/2018 11:54 AM    RBC 4.31 03/08/2018 11:54 AM    HEMOGLOBIN 13.9 03/08/2018 11:54 AM    HEMATOCRIT 42.1 03/08/2018 11:54 AM    MCV 97.7 03/08/2018 11:54 AM    MCH 32.3 03/08/2018 11:54 AM    MCHC 33.0 (L) 03/08/2018 11:54 AM    MPV 9.4 03/08/2018 11:54 AM    NEUTSPOLYS 60.50 01/27/2017 12:05 PM    LYMPHOCYTES 18.70 (L) 01/27/2017 12:05 PM    MONOCYTES 11.60 01/27/2017 12:05 PM    EOSINOPHILS 7.90 (H) 01/27/2017 12:05 PM    BASOPHILS 1.10 01/27/2017 12:05 PM        Lab Results   Component Value Date/Time    HBA1C 5.8 (H) 07/11/2019 12:27 PM        Imaging: I personally reviewed following images, these are my reads    Xray hips and pelvis 03/8/2018  Degenerative changes in the hips bilaterally.  Lumbar facet degenerative changes      IMAGING radiology reads. I reviewed the following radiology reads   Xray cervical spine 06/06/2019  1.  No compression deformity or acute fracture.    2.  Moderate to severe degenerative disc disease and facet arthropathy.    3.  Anterolisthesis at the C6-C7 level is noted and likely related to facet arthropathy.    Xray hips with pelvis: 03/08/2018  No radiographic evidence of acute traumatic bone injury.    Symmetric degenerative changes about both hip joints.    Lumbosacral facet joint degenerative changes.                                                                                                                                           Results for orders placed during the hospital encounter of 01/27/17   DX-SHOULDER 2+ RIGHT    Impression Degenerative changes involving the glenohumeral and acromioclavicular joints.                     Diagnosis   Visit Diagnoses     ICD-10-CM   1. Polymyalgia rheumatica (HCC) M35.3   2. Fibromyalgia M79.7   3. Primary osteoarthritis of both hands M19.041    M19.042   4. Benign essential tremor G25.0           ASSESSMENT:  Magdi Pittman 78 y.o. female with  myofascial pain that could suggest fibromyalgia     Magdi was seen today for follow-up.    Diagnoses and all orders for this visit:    Polymyalgia rheumatica (HCC)  -     predniSONE (DELTASONE) 5 MG Tab; Take 1 Tab by mouth every day for 14 days.  -     predniSONE (DELTASONE) 1 MG Tab; Take 4 Tabs by mouth every day for 14 days, THEN 3 Tabs every day for 14 days.    Fibromyalgia  -     Milnacipran HCl 12.5 MG Tab; Take 1 Tab by mouth 2 times a day for 30 days.    Primary osteoarthritis of both hands    Benign essential tremor  -     REFERRAL TO NEUROLOGY         1. Continue savella 12.5mg daily. Discussed increasing this to 12.5mg po bid.  2. Continue wean of prednisone.  Script give for 2 weeks decreasing 5mg, 4mg, 3mg. Plan to reassess at that point.  3. Discussed getting DEXA scan.  4. Discussed possible consultation with Neurology re: tremors.  Placed today.  5. Follow-up with PCP, Dr. Holbrook as scheduled.      Follow-up: Return in about 5 weeks (around 11/1/2019).    Thank you very much for asking me to participate in Magdi Pittman's care.  Please contact me with any questions or concerns.      Please note that this dictation was created using voice recognition software. I have made every reasonable attempt to correct obvious errors but there may be errors of grammar and content that I may have overlooked prior to finalization of this note.      Issa Stern MD  Physical Medicine and Rehabilitation  Interventional Spine and Sports Physiatry  [facility]

## 2019-10-20 ENCOUNTER — HOSPITAL ENCOUNTER (EMERGENCY)
Facility: MEDICAL CENTER | Age: 79
End: 2019-10-20
Attending: EMERGENCY MEDICINE
Payer: MEDICARE

## 2019-10-20 ENCOUNTER — APPOINTMENT (OUTPATIENT)
Dept: RADIOLOGY | Facility: MEDICAL CENTER | Age: 79
End: 2019-10-20
Attending: EMERGENCY MEDICINE
Payer: MEDICARE

## 2019-10-20 VITALS
WEIGHT: 167.33 LBS | HEART RATE: 78 BPM | DIASTOLIC BLOOD PRESSURE: 78 MMHG | BODY MASS INDEX: 28.57 KG/M2 | HEIGHT: 64 IN | RESPIRATION RATE: 16 BRPM | TEMPERATURE: 99.7 F | SYSTOLIC BLOOD PRESSURE: 161 MMHG | OXYGEN SATURATION: 91 %

## 2019-10-20 DIAGNOSIS — M25.511 ACUTE PAIN OF RIGHT SHOULDER: ICD-10-CM

## 2019-10-20 PROCEDURE — A9270 NON-COVERED ITEM OR SERVICE: HCPCS | Performed by: EMERGENCY MEDICINE

## 2019-10-20 PROCEDURE — 700102 HCHG RX REV CODE 250 W/ 637 OVERRIDE(OP): Performed by: EMERGENCY MEDICINE

## 2019-10-20 PROCEDURE — 99284 EMERGENCY DEPT VISIT MOD MDM: CPT

## 2019-10-20 PROCEDURE — 73030 X-RAY EXAM OF SHOULDER: CPT | Mod: RT

## 2019-10-20 RX ORDER — HYDROCODONE BITARTRATE AND ACETAMINOPHEN 5; 325 MG/1; MG/1
1 TABLET ORAL EVERY 6 HOURS PRN
Qty: 12 TAB | Refills: 0 | Status: SHIPPED | OUTPATIENT
Start: 2019-10-20 | End: 2019-10-23

## 2019-10-20 RX ORDER — HYDROCODONE BITARTRATE AND ACETAMINOPHEN 5; 325 MG/1; MG/1
1 TABLET ORAL ONCE
Status: COMPLETED | OUTPATIENT
Start: 2019-10-20 | End: 2019-10-20

## 2019-10-20 RX ADMIN — HYDROCODONE BITARTRATE AND ACETAMINOPHEN 1 TABLET: 5; 325 TABLET ORAL at 21:39

## 2019-10-21 NOTE — ED PROVIDER NOTES
ED Provider Note    Scribed for Leela Yeh M.D. by Umu Deleon. 10/20/2019, 8:07 PM.    Primary care provider: Nicole Holbrook M.D.  Means of arrival: Walk-In   History obtained from: Patient   History limited by: None     CHIEF COMPLAINT  Chief Complaint   Patient presents with   • Shoulder Pain     RIGHT, was reaching for an object when she developed severe shoulder pain, limited ROM to R shoulder, denies trauma, rates pain 8/10       HPI  Magdi Pittman is a 78 y.o. female who presents to the Emergency Department for right shoulder pain onset earlier today. She endorses associated neck pain. The patient reports that she was attempting to open a door when she felt pain in her right shoulder. The patient notes that movement exacerbated the pain, however there were no alleviating factors reported. Denies any pain anywhere else. She denies any history of shoulder problems. Denies being on blood thinners. Patient denies any numbness or weakness in her arm.  She denies any trauma to her neck.  She states the pain is in her shoulder and radiates into her neck. She has decreased movement slowly because of the pain in her shoulder. No recent fevers or illness. No chest pain.    REVIEW OF SYSTEMS  Pertinent positives include right shoulder pain and neck pain. Pertinent negatives include no other pain anywhere else, numbness, weakness, fever, recent illness, vomiting, trauma, blurry vision, facial droop or trouble speaking.. All other systems reviewed and negative.     PAST MEDICAL HISTORY   has a past medical history of Allergic rhinitis, Arthritis, ASTHMA, Bronchitis, COPD (chronic obstructive pulmonary disease) ([facility]), Daytime sleepiness, Depression, H/O varicose vein ligation (3/26/2014), History of breast cancer (3/26/2014), Hyperthyroidism, Intention tremor (9/28/2016), Lumbar disc disease (3/26/2014), Migraines (3/26/2014), Obesity, Pneumonia, Pulmonary emphysema ([facility]), Rheumatoid arthritis ([facility]),  Rheumatoid factor positive (3/26/2014), Ringing in ears, Shortness of breath, Unspecified disorder of thyroid, and Wears glasses.    SURGICAL HISTORY   has a past surgical history that includes removal synovial cyst,knee (1948); vein ligation (1966); tubal ligation (1973); breast biopsy (1974); tonsillectomy (1958); basal cell excision (2009); knee arthroscopy (Right, 2/26/2010); and meniscectomy (Right, 2/26/2010).    SOCIAL HISTORY  Social History     Tobacco Use   • Smoking status: Never Smoker   • Smokeless tobacco: Never Used   Substance Use Topics   • Alcohol use: Yes     Alcohol/week: 0.6 - 1.2 oz     Types: 1 - 2 Glasses of wine per week     Frequency: 2-3 times a week     Drinks per session: 1 or 2     Comment: 1-2 per week   • Drug use: No      Social History     Substance and Sexual Activity   Drug Use No       FAMILY HISTORY  Family History   Problem Relation Age of Onset   • Cancer Mother         breast   • Lung Disease Father    • Cancer Sister         breast, pancreatic   • Cancer Brother    • Cancer Maternal Aunt    • Cancer Maternal Grandmother        CURRENT MEDICATIONS  Home Medications     Reviewed by Coleen Martinez R.N. (Registered Nurse) on 10/20/19 at 1953  Med List Status: Complete   Medication Last Dose Status   Acetaminophen (TYLENOL ARTHRITIS PAIN PO)  Active   albuterol (PROAIR HFA) 108 (90 Base) MCG/ACT Aero Soln inhalation aerosol  Active   Cholecalciferol (VITAMIND D3) 1000 UNIT Tab  Active   Cyanocobalamin (VITAMIN B-12 PO)  Active   Milnacipran HCl 12.5 MG Tab  Active   Misc. Devices Misc  Active   Multiple Vitamin (MULTIVITAMIN PO)  Active   predniSONE (DELTASONE) 1 MG Tab  Active   thyroid (ARMOUR THYROID) 60 MG Tab  Active   Tiotropium Bromide Monohydrate (SPIRIVA RESPIMAT) 2.5 MCG/ACT Aero Soln  Active   triamterene-hctz (MAXZIDE-25/DYAZIDE) 37.5-25 MG Tab  Active                ALLERGIES  Allergies   Allergen Reactions   • Asa [Aspirin]      Does not take D/T asthma hx   •  "Quinine    • Sulfa Drugs Rash     Skin peeling       PHYSICAL EXAM  VITAL SIGNS: BP (!) 181/88   Pulse (!) 101   Temp 37.6 °C (99.7 °F) (Temporal)   Resp 18   Ht 1.626 m (5' 4\")   Wt 75.9 kg (167 lb 5.3 oz)   SpO2 92%   BMI 28.72 kg/m²     Constitutional: Patient was sitting up in bed with family. Well developed, No acute distress, Non-toxic appearance.   HENT: Normocephalic, Atraumatic, Bilateral external ears normal   Eyes: PERRL, EOMI, Conjunctiva normal  Neck: Normal range of motion, No tenderness, Supple, no midline tenderness to palpation, Right paraspinal and trapezius spasm and tenderness.  Cardiovascular: Normal heart rate, Normal rhythm,   Thorax & Lungs: Normal breath sounds, No respiratory distress, No chest tenderness.   Abdomen: Benign abdominal exam, no guarding no rebound no tenderness, no distention  Skin: Warm, Dry, No erythema, No rash.   Back: No midline back tenderness. No CVA tenderness.   Extremities: Intact distal pulses, No edema, right shoulder  No anterior fullness. No swelling to shoulder. No redness or warmth.  Musculoskeletal: Pain with extension of the shoulder but able to range shoulder with assistance. Good  strength. +2 radial pulse.   Neurologic: Alert & oriented x 3, Normal motor function, Normal sensory function, No focal deficits noted. Intact sensation  Psychiatric: Appropriate                                                     DIAGNOSTIC STUDIES / PROCEDURES\    RADIOLOGY  DX-SHOULDER 2+ RIGHT   Final Result         1.  No acute traumatic bony injury.   2.  Severe degenerative changes of the right shoulder joint           The radiologist's interpretation of all radiological studies have been reviewed by me.    COURSE & MEDICAL DECISION MAKING  Nursing notes, Tanner MCLEAN reviewed in chart.     8:07 PM patient presents to the emergency department with right shoulder pain.  Patient was reaching for a door when she had sudden onset of pain in her shoulder that radiates " up into her neck.  There is no midline tenderness to palpation of her neck.  No history of trauma to the neck.  She is not having any weakness or numbness to her right arm.  Her inability to move her arm is secondary to pain.  She has good pulses distally.  She has good  strength.  I am able to range her shoulder with assistance and I think dislocation is unlikely but will obtain an x-ray.  I also think fracture is unlikely as there is no history of trauma.  But cannot exclude an occult fracture.  Also the differentials of possible ligament injury.  Patient currently is not having pain where she request pain medication.  Patient not have any chest pain or shortness of breath.  No other extremity pain.  Has not had any fevers and there is no warmth or erythema to suggest an infectious etiology.    9:12 PM x-rays returned and shows no evidence of dislocation.  There is evidence of severe degenerative changes to the right shoulder joint.  At this point I believe the patient is stable for outpatient management.  I spoke with the family again about the results.  They state they came here because they were more concerned about her pain being related to a stroke.  Repeat neurologic exam is intact.  And again her pain occurred first in her shoulder and then she had problems moving her shoulder secondary to pain.  There is never weakness and she never had any difficulty with her hand or  strength.  She also never had any numbness.  She also did not have any facial or arm involvement.  I gave reassurance.  Patient and family understand the plan.  They were also given strict return precautions.    In prescribing controlled substances to this patient, I certify that I have obtained and reviewed the medical history of Magdi Pittman. I have also made a good marlyn effort to obtain applicable records from other providers who have treated the patient and no other records are available at this time.     I have conducted a  physical exam and documented it. I have reviewed Ms. Pittman’s prescription history as maintained by the [place] Prescription Monitoring Program.     I have assessed the patient’s risk for abuse, dependency, and addiction using the validated Opioid Risk Tool available at https://www.mdcalc.com/jhcbxj-snuj-kvlf-ort-narcotic-abuse.     Given the above, I believe the benefits of controlled substance therapy outweigh the risks. The reasons for prescribing controlled substances include non-narcotic, oral analgesic alternatives have been inadequate for pain control. Accordingly, I have discussed the risk and benefits, treatment plan, and alternative therapies with the patient.         FINAL IMPRESSION  1. Acute pain of right shoulder          IUmu (Viralibbonita), am scribing for, and in the presence of, Leela Yeh M.D..    Electronically signed by: Umu Deleon (Viralibbonita), 10/20/2019    ILeela M.D. personally performed the services described in this documentation, as scribed by Umu Deleon in my presence, and it is both accurate and complete. C.    The note accurately reflects work and decisions made by me.  Leela Yeh  10/20/2019  9:30 PM

## 2019-10-21 NOTE — ED TRIAGE NOTES
Magdi Pittman  78 y.o.  Chief Complaint   Patient presents with   • Shoulder Pain     RIGHT, was reaching for an object when she developed severe shoulder pain, limited ROM to R shoulder, denies trauma, rates pain 8/10     Ambulatory to triage with steady gait for above.    Unable to lift R shoulder in triage.    States that pain is radiating to R neck.    Charge RN Nikki notified of patient.    Patient directly from triage to [facility] [address] via wheelchair accompanied by ED RN and family.

## 2019-10-21 NOTE — DISCHARGE INSTRUCTIONS
You have evidence of severe arthritis to your right shoulder.  I suspect that the cause of your pain.  Is important to follow-up with your regular doctor.  Your blood pressure is slightly high here today to.  That needs to be checked by your regular doctor.  If you develop any coolness to your arm, fevers, weakness to your face or leg or any new or different symptoms return.   not examined

## 2019-10-21 NOTE — ED NOTES
Pt c/o right shoulder pain radiating down arm with limited ROM s/p reaching for door to let dog out.

## 2019-11-06 ENCOUNTER — OFFICE VISIT (OUTPATIENT)
Dept: PHYSICAL MEDICINE AND REHAB | Facility: MEDICAL CENTER | Age: 79
End: 2019-11-06
Payer: MEDICARE

## 2019-11-06 VITALS
HEART RATE: 80 BPM | HEIGHT: 64 IN | SYSTOLIC BLOOD PRESSURE: 132 MMHG | BODY MASS INDEX: 28.23 KG/M2 | TEMPERATURE: 98.4 F | WEIGHT: 165.34 LBS | OXYGEN SATURATION: 90 % | DIASTOLIC BLOOD PRESSURE: 78 MMHG

## 2019-11-06 DIAGNOSIS — F32.A DEPRESSION, UNSPECIFIED DEPRESSION TYPE: ICD-10-CM

## 2019-11-06 DIAGNOSIS — M35.3 POLYMYALGIA RHEUMATICA (HCC): ICD-10-CM

## 2019-11-06 DIAGNOSIS — M79.7 FIBROMYALGIA: ICD-10-CM

## 2019-11-06 DIAGNOSIS — G25.0 BENIGN ESSENTIAL TREMOR: ICD-10-CM

## 2019-11-06 PROCEDURE — 99214 OFFICE O/P EST MOD 30 MIN: CPT | Performed by: PHYSICAL MEDICINE & REHABILITATION

## 2019-11-06 ASSESSMENT — PAIN SCALES - GENERAL: PAINLEVEL: 6=MODERATE PAIN

## 2019-11-06 ASSESSMENT — PATIENT HEALTH QUESTIONNAIRE - PHQ9
SUM OF ALL RESPONSES TO PHQ QUESTIONS 1-9: 11
CLINICAL INTERPRETATION OF PHQ2 SCORE: 4
5. POOR APPETITE OR OVEREATING: 2 - MORE THAN HALF THE DAYS

## 2019-11-06 NOTE — PROGRESS NOTES
Follow-up patient note    Physiatry (physical medicine and  Rehabilitation), interventional spine and sports medicine    Date of Service: 11/6/2019    Chief complaint: Fibromyalgia and PMR    HISTORY    HPI: Magdi Pittman 78 y.o. female who presents today for evaluation of her fibromyalgia and after starting prednisone for PMR.    Magdi is currently taking prednisone 3mg daily.  She has reduced from 6mg at the last visit.  It seems like she has been having more difficulty with her mood.  She wonders if this is due to the milnacipran 12.5mg po bid.    Pain has not changed much with the reduction of prednisone.    Her tremors are worse, she thinks.  This is intermittent.    Overall, her pain is still aching in her shoulders elbows, thighs and legs.  It is an 6/10.    ORT: 8  PHQ-9: 14, previously, now 11    Depression Screening    Interpretation of PHQ-9 Total Score   Score Severity   1-4 No Depression   5-9 Mild Depression   10-14 Moderate Depression   15-19 Moderately Severe Depression   20-27 Severe Depression      Medical records review:  I reviewed the note from the referring provider Nicole Holbrook M.D. dated 05/13/2019.  They have tried wellbutrin, cymbalta, celexa, lexapro, sertraline, amitriptyline without improvement.  She does see a counselor once a month.  They agreed on referral here.    Previous treatments:    Physical Therapy: Yes    Medications the patient is tried: Tylenol, up to three in 24hours.  She has tried wellbutrin, cymbalta, celexa, lexapro, sertraline and amitriptyline.    Previous interventions: none    Previous surgeries to relieve the above pain:  none      ROS: as noted in HPI, PMH or below.  No changes from 09/27/2019 except as HPI  Resp: Bronchitis, COPD exacerbation  Psych: Depression  Endo: Hypothyroidism  : recent UTI symptoms.    Red Flags ROS:   Fever, Chills, Sweats: Denies  Involuntary Weight Loss: Denies  Bladder Incontinence: Denies  Bowel Incontinence:  denies  Saddle Anesthesia: Denies    All other systems reviewed and negative.       PMHx:   Past Medical History:   Diagnosis Date   • Allergic rhinitis    • Arthritis    • ASTHMA    • Bronchitis    • COPD (chronic obstructive pulmonary disease) (HCC)    • Daytime sleepiness    • Depression    • H/O varicose vein ligation 3/26/2014   • History of breast cancer 3/26/2014   • Hyperthyroidism    • Intention tremor 9/28/2016   • Lumbar disc disease 3/26/2014   • Migraines 3/26/2014   • Obesity    • Pneumonia    • Pulmonary emphysema (HCC)    • Rheumatoid arthritis (HCC)    • Rheumatoid factor positive 3/26/2014    -CCP, Negative hepatitis panel     • Ringing in ears    • Shortness of breath    • Unspecified disorder of thyroid    • Wears glasses        PSHx:   Past Surgical History:   Procedure Laterality Date   • KNEE ARTHROSCOPY Right 2/26/2010    Procedure: WITH DEBRIDEMENT MEDIAL FEMORAL CONDYLE & PATELLA;  Surgeon: Gregorio Sloan M.D.;  Location: SURGERY [facility];  Service:    • MENISCECTOMY Right 2/26/2010    Procedure: PARTIAL LATERAL  ;  Surgeon: Gregorio Sloan M.D.;  Location: SURGERY [facility];  Service:    • BASAL CELL EXCISION  2009    left leg   • BREAST BIOPSY  1974    left breast   • TUBAL LIGATION  1973   • VEIN LIGATION  1966    bilateral legs   • TONSILLECTOMY  1958   • PB REMOVAL SYNOVIAL CYST,KNEE  1948    right knee       Family history   Family History   Problem Relation Age of Onset   • Cancer Mother         breast   • Lung Disease Father    • Cancer Sister         breast, pancreatic   • Cancer Brother    • Cancer Maternal Aunt    • Cancer Maternal Grandmother          Medications:   Current Outpatient Medications   Medication   • Cholecalciferol (VITAMIND D3) 1000 UNIT Tab   • thyroid (ARMOUR THYROID) 60 MG Tab   • albuterol (PROAIR HFA) 108 (90 Base) MCG/ACT Aero Soln inhalation aerosol   • Acetaminophen (TYLENOL ARTHRITIS PAIN PO)   • Tiotropium Bromide Monohydrate (SPIRIVA  RESPIMAT) 2.5 MCG/ACT Aero Soln   • triamterene-hctz (MAXZIDE-25/DYAZIDE) 37.5-25 MG Tab   • Cyanocobalamin (VITAMIN B-12 PO)   • Multiple Vitamin (MULTIVITAMIN PO)   • Misc. Devices Misc     No current facility-administered medications for this visit.        Allergies:   Allergies   Allergen Reactions   • Asa [Aspirin]      Does not take D/T asthma hx   • Quinine    • Sulfa Drugs Rash     Skin peeling       Social Hx:   Social History     Socioeconomic History   • Marital status:      Spouse name: Not on file   • Number of children: Not on file   • Years of education: Not on file   • Highest education level: Not on file   Occupational History   • Not on file   Social Needs   • Financial resource strain: Not on file   • Food insecurity:     Worry: Not on file     Inability: Not on file   • Transportation needs:     Medical: Not on file     Non-medical: Not on file   Tobacco Use   • Smoking status: Never Smoker   • Smokeless tobacco: Never Used   Substance and Sexual Activity   • Alcohol use: Yes     Alcohol/week: 0.6 - 1.2 oz     Types: 1 - 2 Glasses of wine per week     Frequency: 2-3 times a week     Drinks per session: 1 or 2     Comment: 1-2 per week   • Drug use: No   • Sexual activity: Not Currently   Lifestyle   • Physical activity:     Days per week: Not on file     Minutes per session: Not on file   • Stress: Not on file   Relationships   • Social connections:     Talks on phone: Not on file     Gets together: Not on file     Attends Hoahaoism service: Not on file     Active member of club or organization: Not on file     Attends meetings of clubs or organizations: Not on file     Relationship status: Not on file   • Intimate partner violence:     Fear of current or ex partner: Not on file     Emotionally abused: Not on file     Physically abused: Not on file     Forced sexual activity: Not on file   Other Topics Concern   •  Service No   • Blood Transfusions No   • Caffeine Concern No   •  "Occupational Exposure No   • Hobby Hazards No   • Sleep Concern No   • Stress Concern Yes   • Weight Concern No   • Special Diet No   • Back Care No   • Exercise No   • Bike Helmet No   • Seat Belt Yes   • Self-Exams No   Social History Narrative   • Not on file         EXAMINATION     Physical Exam:   Vitals: /78 (BP Location: Right arm, Patient Position: Sitting, BP Cuff Size: Large adult long)   Pulse 80   Temp 36.9 °C (98.4 °F) (Temporal)   Ht 1.626 m (5' 4\")   Wt 75 kg (165 lb 5.5 oz)   SpO2 90%     Constitutional:   Body Habitus: Body mass index is 28.38 kg/m².  Cooperation: Fully cooperates with exam  Appearance: Well-groomed, well-nourished, not disheveled, in no acute distress    Mild tremor noted    Eyes: No scleral icterus, no proptosis    ENT -no obvious auditory deficits, no facial droop     Skin -no rashes or lesions noted     Respiratory-  breathing comfortable on room air, no audible wheezing     Psychiatric- alert and oriented ×3. Normal affect.     Gait - normal gait, no use of ambulatory device, no loss of balance        MEDICAL DECISION MAKING    Medical records review: see under HPI section.     DATA    Labs:   08/01/2019 CRP 2.3  08/01/2019 ESR 8, was 31  Lab Results   Component Value Date/Time    SODIUM 139 03/19/2019 02:08 PM    POTASSIUM 3.6 03/19/2019 02:08 PM    CHLORIDE 102 03/19/2019 02:08 PM    CO2 27 03/19/2019 02:08 PM    ANION 10.0 03/19/2019 02:08 PM    GLUCOSE 107 (H) 03/19/2019 02:08 PM    BUN 22 03/19/2019 02:08 PM    CREATININE 0.61 06/06/2019 02:50 PM    CALCIUM 9.6 03/19/2019 02:08 PM    ASTSGOT 27 03/08/2018 11:54 AM    ALTSGPT 31 03/08/2018 11:54 AM    TBILIRUBIN 0.5 03/08/2018 11:54 AM    ALBUMIN 4.4 03/08/2018 11:54 AM    TOTPROTEIN 7.4 03/08/2018 11:54 AM    GLOBULIN 3.0 03/08/2018 11:54 AM    AGRATIO 1.5 03/08/2018 11:54 AM     CRP: 03/8/2018: 4.1    No results found for: PROTHROMBTM, INR     Lab Results   Component Value Date/Time    WBC 6.6 03/08/2018 11:54 " AM    RBC 4.31 03/08/2018 11:54 AM    HEMOGLOBIN 13.9 03/08/2018 11:54 AM    HEMATOCRIT 42.1 03/08/2018 11:54 AM    MCV 97.7 03/08/2018 11:54 AM    MCH 32.3 03/08/2018 11:54 AM    MCHC 33.0 (L) 03/08/2018 11:54 AM    MPV 9.4 03/08/2018 11:54 AM    NEUTSPOLYS 60.50 01/27/2017 12:05 PM    LYMPHOCYTES 18.70 (L) 01/27/2017 12:05 PM    MONOCYTES 11.60 01/27/2017 12:05 PM    EOSINOPHILS 7.90 (H) 01/27/2017 12:05 PM    BASOPHILS 1.10 01/27/2017 12:05 PM        Lab Results   Component Value Date/Time    HBA1C 5.8 (H) 07/11/2019 12:27 PM        Imaging: I personally reviewed following images, these are my reads    Xray hips and pelvis 03/8/2018  Degenerative changes in the hips bilaterally.  Lumbar facet degenerative changes      IMAGING radiology reads. I reviewed the following radiology reads   Xray cervical spine 06/06/2019  1.  No compression deformity or acute fracture.    2.  Moderate to severe degenerative disc disease and facet arthropathy.    3.  Anterolisthesis at the C6-C7 level is noted and likely related to facet arthropathy.    Xray hips with pelvis: 03/08/2018  No radiographic evidence of acute traumatic bone injury.    Symmetric degenerative changes about both hip joints.    Lumbosacral facet joint degenerative changes.                                                                                                                                           Results for orders placed during the hospital encounter of 01/27/17   DX-SHOULDER 2+ RIGHT    Impression Degenerative changes involving the glenohumeral and acromioclavicular joints.                     Diagnosis   Visit Diagnoses     ICD-10-CM   1. Depression, unspecified depression type F32.9   2. Fibromyalgia M79.7   3. Polymyalgia rheumatica (HCC) M35.3   4. Benign essential tremor G25.0           ASSESSMENT:  Magdi Pittman 78 y.o. female with myofascial pain that could suggest fibromyalgia     Magdi was seen today for follow-up.    Diagnoses and all  orders for this visit:    Depression, unspecified depression type  -     Patient has been identified as having a positive depression screening. Appropriate orders and counseling have been given.    Fibromyalgia    Polymyalgia rheumatica (HCC)    Benign essential tremor         1. Discussed discontinuing savella.  2. Prednisone wean.  No big change in pain level.  Decrease to 2mg for 3 days and then 1mg until her pills are gone.  3. Discussed possible consultation with Neurology re: tremors.  Order pending  5. Follow-up with PCP, Dr. Holbrook as scheduled on 11/21/2019        Follow-up: Return in about 4 weeks (around 12/4/2019).    Thank you very much for asking me to participate in Magdi Pittman's care.  Please contact me with any questions or concerns.      Please note that this dictation was created using voice recognition software. I have made every reasonable attempt to correct obvious errors but there may be errors of grammar and content that I may have overlooked prior to finalization of this note.      Issa Stern MD  Physical Medicine and Rehabilitation  Interventional Spine and Sports Physiatry  [facility]

## 2019-12-03 DIAGNOSIS — E43 EDEMA DUE TO MALNUTRITION, DUE TO UNSPECIFIED MALNUTRITION TYPE (HCC): ICD-10-CM

## 2019-12-03 RX ORDER — TIOTROPIUM BROMIDE INHALATION SPRAY 3.12 UG/1
SPRAY, METERED RESPIRATORY (INHALATION)
Qty: 1 INHALER | Refills: 11 | Status: SHIPPED | OUTPATIENT
Start: 2019-12-03 | End: 2020-05-28 | Stop reason: SDUPTHER

## 2019-12-03 RX ORDER — TRIAMTERENE AND HYDROCHLOROTHIAZIDE 37.5; 25 MG/1; MG/1
TABLET ORAL
Qty: 90 TAB | Refills: 1 | Status: SHIPPED | OUTPATIENT
Start: 2019-12-03 | End: 2020-07-14 | Stop reason: SDUPTHER

## 2019-12-05 ENCOUNTER — APPOINTMENT (OUTPATIENT)
Dept: PHYSICAL MEDICINE AND REHAB | Facility: MEDICAL CENTER | Age: 79
End: 2019-12-05
Payer: MEDICARE

## 2019-12-20 ENCOUNTER — OFFICE VISIT (OUTPATIENT)
Dept: MEDICAL GROUP | Facility: MEDICAL CENTER | Age: 79
End: 2019-12-20
Payer: MEDICARE

## 2019-12-20 VITALS
TEMPERATURE: 98.7 F | DIASTOLIC BLOOD PRESSURE: 76 MMHG | BODY MASS INDEX: 27.66 KG/M2 | SYSTOLIC BLOOD PRESSURE: 124 MMHG | HEIGHT: 64 IN | HEART RATE: 83 BPM | RESPIRATION RATE: 16 BRPM | OXYGEN SATURATION: 90 % | WEIGHT: 162 LBS

## 2019-12-20 DIAGNOSIS — E03.4 HYPOTHYROIDISM DUE TO ACQUIRED ATROPHY OF THYROID: ICD-10-CM

## 2019-12-20 DIAGNOSIS — Z23 NEED FOR VACCINATION: ICD-10-CM

## 2019-12-20 DIAGNOSIS — R73.9 HYPERGLYCEMIA: ICD-10-CM

## 2019-12-20 DIAGNOSIS — M79.89 LEG SWELLING: ICD-10-CM

## 2019-12-20 DIAGNOSIS — M79.7 FIBROMYALGIA: ICD-10-CM

## 2019-12-20 DIAGNOSIS — J96.11 CHRONIC RESPIRATORY FAILURE WITH HYPOXIA (HCC): ICD-10-CM

## 2019-12-20 DIAGNOSIS — J44.9 CHRONIC OBSTRUCTIVE PULMONARY DISEASE, UNSPECIFIED COPD TYPE (HCC): ICD-10-CM

## 2019-12-20 DIAGNOSIS — J45.40 MODERATE PERSISTENT ASTHMA WITHOUT COMPLICATION: ICD-10-CM

## 2019-12-20 PROCEDURE — 90662 IIV NO PRSV INCREASED AG IM: CPT | Performed by: FAMILY MEDICINE

## 2019-12-20 PROCEDURE — 99214 OFFICE O/P EST MOD 30 MIN: CPT | Mod: 25 | Performed by: FAMILY MEDICINE

## 2019-12-20 PROCEDURE — G0008 ADMIN INFLUENZA VIRUS VAC: HCPCS | Performed by: FAMILY MEDICINE

## 2019-12-20 NOTE — PROGRESS NOTES
cc:  thyroid    Subjective:     Magdi Pittman is a 79 y.o. female presenting for:    1. Hypothyroidism: Has a history of an underactive thyroid for years.  Has been on [place] Thyroid 60 mg daily. Denies any heat/cold intolerance, diarrhea/constipation, abdominal pain, skin changes, palpitations.  Tremors have been stable.  Last TSH was normal 3/2019     2.  Fibromyalgia: She continues to have generalized muscle aches intermittently.  This has been stable.  She was on prednisone, but has been able to taper off.  She continues to see physiatry.    3. COPD, pulmonary nodules: Has a history of COPD and asthma, sees pulmonology regularly.  She continues with Spiriva, albuterol as needed.  Denies any shortness of breath, cough, fevers.   She currently uses 2 L of oxygen from a portable concentrator at night, this has been stable.  She occasionally has nosebleeds.     4.  Leg swelling: Has intermittent leg swelling, takes Maxide daily.  Denies any leg swelling, chest pain, shortness of breath, lightheadedness, dizziness.    Review of systems:  See above.       Current Outpatient Medications:   •  triamterene-hctz (MAXZIDE-25/DYAZIDE) 37.5-25 MG Tab, TAKE 1 TABLET BY MOUTH EVERY DAY, Disp: 90 Tab, Rfl: 1  •  SPIRIVA RESPIMAT 2.5 MCG/ACT Aero Soln, INHALE 2 PUFFS BY MOUTH EVERY DAY., Disp: 1 Inhaler, Rfl: 11  •  Cholecalciferol (VITAMIND D3) 1000 UNIT Tab, Take 1,000 Units by mouth every day., Disp: , Rfl:   •  Misc. Devices Misc, 2L oxygen via nasal cannula at night while sleeping, Disp: 1 Units, Rfl: 0  •  thyroid (ARMOUR THYROID) 60 MG Tab, Take 1 Tab by mouth every day., Disp: 90 Tab, Rfl: 3  •  albuterol (PROAIR HFA) 108 (90 Base) MCG/ACT Aero Soln inhalation aerosol, Inhale 1-2 Puffs by mouth every 6 hours as needed for Shortness of Breath., Disp: 1 Inhaler, Rfl: 3  •  Acetaminophen (TYLENOL ARTHRITIS PAIN PO), Take 650 mg by mouth 2 Times a Day., Disp: , Rfl:   •  Cyanocobalamin (VITAMIN B-12 PO), Take  by mouth  "every day., Disp: , Rfl:   •  Multiple Vitamin (MULTIVITAMIN PO), Take 1 Tab by mouth every day., Disp: , Rfl:     Allergies, past medical history, past surgical history, family history, social history reviewed and updated    Objective:     Vitals: /76 (BP Location: Left arm)   Pulse 83   Temp 37.1 °C (98.7 °F)   Resp 16   Ht 1.626 m (5' 4\")   Wt 73.5 kg (162 lb)   SpO2 90% Comment: 2 ltr at night  BMI 27.81 kg/m²   General: Alert, pleasant, NAD  HEENT: Normocephalic.   Heart: Regular rate and rhythm.  S1 and S2 normal.  No murmurs appreciated.  Respiratory: Normal respiratory effort.  Clear to auscultation bilaterally.  Abdomen: Non-distended, soft  Skin: Warm, dry, no rashes.  Musculoskeletal: Gait is normal.  Moves all extremities well.  Extremities: No leg edema.  Psych:  Affect/mood is normal, judgement is good, memory is intact, grooming is appropriate.    Assessment/Plan:     Magdi was seen today for hypertension.    Diagnoses and all orders for this visit:    Hypothyroidism due to acquired atrophy of thyroid  Stable, continue [place] Thyroid.  Follow-up in 3 months  -     Basic Metabolic Panel; Future  -     CBC WITHOUT DIFFERENTIAL; Future  -     TSH; Future    Fibromyalgia  Stable, continue to monitor  -     Basic Metabolic Panel; Future  -     CBC WITHOUT DIFFERENTIAL; Future  -     TSH; Future    Moderate persistent asthma without complication  Chronic obstructive pulmonary disease, unspecified COPD type (HCC)  Chronic respiratory failure with hypoxia (HCC)  Stable, continue current inhalers, oxygen    Leg swelling  Stable, continue Maxide  -     Basic Metabolic Panel; Future  -     CBC WITHOUT DIFFERENTIAL; Future  -     TSH; Future    Hyperglycemia  Stable, continue to monitor  -     HEMOGLOBIN A1C; Future    Need for vaccination  -     Influenza Vaccine, High Dose (65+ Only)      Return in about 3 months (around 3/20/2020) for routine follow up.      "

## 2020-02-13 ENCOUNTER — APPOINTMENT (OUTPATIENT)
Dept: PULMONOLOGY | Facility: HOSPICE | Age: 80
End: 2020-02-13
Payer: MEDICARE

## 2020-04-16 RX ORDER — THYROID 60 MG/1
60 TABLET ORAL DAILY
Qty: 90 TAB | Refills: 3 | Status: SHIPPED | OUTPATIENT
Start: 2020-04-16 | End: 2021-01-21

## 2020-05-28 ENCOUNTER — HOSPITAL ENCOUNTER (OUTPATIENT)
Dept: LAB | Facility: MEDICAL CENTER | Age: 80
End: 2020-05-28
Attending: FAMILY MEDICINE
Payer: MEDICARE

## 2020-05-28 ENCOUNTER — OFFICE VISIT (OUTPATIENT)
Dept: MEDICAL GROUP | Facility: MEDICAL CENTER | Age: 80
End: 2020-05-28
Payer: MEDICARE

## 2020-05-28 VITALS
OXYGEN SATURATION: 91 % | DIASTOLIC BLOOD PRESSURE: 72 MMHG | WEIGHT: 157 LBS | BODY MASS INDEX: 26.8 KG/M2 | HEART RATE: 84 BPM | RESPIRATION RATE: 16 BRPM | TEMPERATURE: 97.8 F | SYSTOLIC BLOOD PRESSURE: 122 MMHG | HEIGHT: 64 IN

## 2020-05-28 DIAGNOSIS — R73.9 HYPERGLYCEMIA: ICD-10-CM

## 2020-05-28 DIAGNOSIS — J44.9 CHRONIC OBSTRUCTIVE PULMONARY DISEASE, UNSPECIFIED COPD TYPE (HCC): ICD-10-CM

## 2020-05-28 DIAGNOSIS — E03.4 HYPOTHYROIDISM DUE TO ACQUIRED ATROPHY OF THYROID: ICD-10-CM

## 2020-05-28 DIAGNOSIS — M79.89 LEG SWELLING: ICD-10-CM

## 2020-05-28 DIAGNOSIS — F33.1 MODERATE EPISODE OF RECURRENT MAJOR DEPRESSIVE DISORDER (HCC): ICD-10-CM

## 2020-05-28 DIAGNOSIS — M79.7 FIBROMYALGIA: ICD-10-CM

## 2020-05-28 LAB
ANION GAP SERPL CALC-SCNC: 13 MMOL/L (ref 7–16)
BUN SERPL-MCNC: 22 MG/DL (ref 8–22)
CALCIUM SERPL-MCNC: 9.8 MG/DL (ref 8.5–10.5)
CHLORIDE SERPL-SCNC: 100 MMOL/L (ref 96–112)
CO2 SERPL-SCNC: 25 MMOL/L (ref 20–33)
CREAT SERPL-MCNC: 0.59 MG/DL (ref 0.5–1.4)
ERYTHROCYTE [DISTWIDTH] IN BLOOD BY AUTOMATED COUNT: 43 FL (ref 35.9–50)
EST. AVERAGE GLUCOSE BLD GHB EST-MCNC: 117 MG/DL
FASTING STATUS PATIENT QL REPORTED: NORMAL
GLUCOSE SERPL-MCNC: 89 MG/DL (ref 65–99)
HBA1C MFR BLD: 5.7 % (ref 0–5.6)
HCT VFR BLD AUTO: 41.4 % (ref 37–47)
HGB BLD-MCNC: 14.1 G/DL (ref 12–16)
MCH RBC QN AUTO: 33 PG (ref 27–33)
MCHC RBC AUTO-ENTMCNC: 34.1 G/DL (ref 33.6–35)
MCV RBC AUTO: 97 FL (ref 81.4–97.8)
PLATELET # BLD AUTO: 399 K/UL (ref 164–446)
PMV BLD AUTO: 9.1 FL (ref 9–12.9)
POTASSIUM SERPL-SCNC: 3.6 MMOL/L (ref 3.6–5.5)
RBC # BLD AUTO: 4.27 M/UL (ref 4.2–5.4)
SODIUM SERPL-SCNC: 138 MMOL/L (ref 135–145)
TSH SERPL DL<=0.005 MIU/L-ACNC: 1.03 UIU/ML (ref 0.38–5.33)
WBC # BLD AUTO: 6.1 K/UL (ref 4.8–10.8)

## 2020-05-28 PROCEDURE — 84443 ASSAY THYROID STIM HORMONE: CPT

## 2020-05-28 PROCEDURE — 99214 OFFICE O/P EST MOD 30 MIN: CPT | Performed by: FAMILY MEDICINE

## 2020-05-28 PROCEDURE — 36415 COLL VENOUS BLD VENIPUNCTURE: CPT

## 2020-05-28 PROCEDURE — 85027 COMPLETE CBC AUTOMATED: CPT

## 2020-05-28 PROCEDURE — 83036 HEMOGLOBIN GLYCOSYLATED A1C: CPT | Mod: GA

## 2020-05-28 PROCEDURE — 80048 BASIC METABOLIC PNL TOTAL CA: CPT

## 2020-05-28 RX ORDER — ALBUTEROL SULFATE 90 UG/1
1-2 AEROSOL, METERED RESPIRATORY (INHALATION) EVERY 6 HOURS PRN
Qty: 1 INHALER | Refills: 3 | Status: SHIPPED | OUTPATIENT
Start: 2020-05-28 | End: 2022-08-04 | Stop reason: SDUPTHER

## 2020-05-28 RX ORDER — TIOTROPIUM BROMIDE INHALATION SPRAY 3.12 UG/1
2 SPRAY, METERED RESPIRATORY (INHALATION)
Qty: 1 INHALER | Refills: 11 | Status: SHIPPED | OUTPATIENT
Start: 2020-05-28 | End: 2021-06-20

## 2020-05-28 NOTE — PROGRESS NOTES
cc:  hypothyroidism    Subjective:     Magdi Pittman is a 79 y.o. female presenting for:     1. Hypothyroidism: Has a history of an underactive thyroid for years.  Has been on [place] Thyroid 60 mg daily. Denies any heat/cold intolerance, diarrhea/constipation, abdominal pain, skin changes, palpitations.  Tremors have been stable.  Last TSH was normal 3/2019.  She hasn't done her labs yet.     2.  Fibromyalgia: She continues to have generalized muscle aches intermittently.  This has been stable.  She was on prednisone, but has been able to taper off.  She hasn't followed up with physiatry, yet. Is wondering what to expect with her fibromyalgia in the future.  She had been feeling more depression over the winter, but feels improved.  She is not interested in trying more medications at this point.     3. COPD, pulmonary nodules: Has a history of COPD and asthma, sees pulmonology regularly.  She continues with Spiriva, albuterol as needed.  Denies any shortness of breath, cough, fevers.   She currently uses 2 L of oxygen from a portable concentrator at night, this has been stable.       4.  Leg swelling: Has intermittent leg swelling, takes Maxide daily.  Denies any leg swelling, chest pain, shortness of breath, lightheadedness, dizziness.    Review of systems:  See above.       Current Outpatient Medications:   •  albuterol (PROAIR HFA) 108 (90 Base) MCG/ACT Aero Soln inhalation aerosol, Inhale 1-2 Puffs by mouth every 6 hours as needed for Shortness of Breath., Disp: 1 Inhaler, Rfl: 3  •  Tiotropium Bromide Monohydrate (SPIRIVA RESPIMAT) 2.5 MCG/ACT Aero Soln, Inhale 2 Puffs by mouth every day., Disp: 1 Inhaler, Rfl: 11  •  thyroid (ARMOUR THYROID) 60 MG Tab, Take 1 Tab by mouth every day., Disp: 90 Tab, Rfl: 3  •  triamterene-hctz (MAXZIDE-25/DYAZIDE) 37.5-25 MG Tab, TAKE 1 TABLET BY MOUTH EVERY DAY, Disp: 90 Tab, Rfl: 1  •  Cholecalciferol (VITAMIND D3) 1000 UNIT Tab, Take 1,000 Units by mouth every day., Disp: ,  "Rfl:   •  Misc. Devices Misc, 2L oxygen via nasal cannula at night while sleeping, Disp: 1 Units, Rfl: 0  •  Acetaminophen (TYLENOL ARTHRITIS PAIN PO), Take 650 mg by mouth 2 Times a Day., Disp: , Rfl:   •  Cyanocobalamin (VITAMIN B-12 PO), Take  by mouth every day., Disp: , Rfl:   •  Multiple Vitamin (MULTIVITAMIN PO), Take 1 Tab by mouth every day., Disp: , Rfl:     Allergies, past medical history, past surgical history, family history, social history reviewed and updated    Objective:     Vitals: /72   Pulse 84   Temp 36.6 °C (97.8 °F)   Resp 16   Ht 1.626 m (5' 4\")   Wt 71.2 kg (157 lb)   SpO2 91%   BMI 26.95 kg/m²   General: Alert, pleasant, NAD  HEENT: Normocephalic.  Heart: Regular rate and rhythm.  S1 and S2 normal.  No murmurs appreciated.  Respiratory: Normal respiratory effort.  Clear to auscultation bilaterally.  Abdomen: Non-distended, soft  Psych:  Affect/mood is normal, judgement is good, memory is intact, grooming is appropriate.    Assessment/Plan:     Magdi was seen today for follow-up.    Diagnoses and all orders for this visit:    Hypothyroidism due to acquired atrophy of thyroid  Potentially stable.  Discussed doing her labs soon.  Continue the [place] Thyroid    Fibromyalgia  Stable, discussed prognosis, progression.  She plans to make an appointment with physiatry again soon    Leg swelling  Stable, continue Maxide    Chronic obstructive pulmonary disease, unspecified COPD type (HCC)  Stable, continue current inhalers  -     albuterol (PROAIR HFA) 108 (90 Base) MCG/ACT Aero Soln inhalation aerosol; Inhale 1-2 Puffs by mouth every 6 hours as needed for Shortness of Breath.  -     Tiotropium Bromide Monohydrate (SPIRIVA RESPIMAT) 2.5 MCG/ACT Aero Soln; Inhale 2 Puffs by mouth every day.    Moderate episode of recurrent major depressive disorder (HCC)  Stable, we discussed a referral to psychiatry as she has tried multiple different medications.  She was not interested at this time. "  She will let us know if she changes her mind.      Return in about 4 months (around 9/28/2020).

## 2020-06-04 ENCOUNTER — OFFICE VISIT (OUTPATIENT)
Dept: PULMONOLOGY | Facility: HOSPICE | Age: 80
End: 2020-06-04
Payer: MEDICARE

## 2020-06-04 VITALS
BODY MASS INDEX: 27.14 KG/M2 | OXYGEN SATURATION: 93 % | DIASTOLIC BLOOD PRESSURE: 66 MMHG | HEART RATE: 85 BPM | SYSTOLIC BLOOD PRESSURE: 132 MMHG | HEIGHT: 64 IN | RESPIRATION RATE: 16 BRPM | WEIGHT: 159 LBS

## 2020-06-04 DIAGNOSIS — J96.11 CHRONIC RESPIRATORY FAILURE WITH HYPOXIA (HCC): ICD-10-CM

## 2020-06-04 DIAGNOSIS — J44.9 CHRONIC OBSTRUCTIVE PULMONARY DISEASE, UNSPECIFIED COPD TYPE (HCC): ICD-10-CM

## 2020-06-04 PROCEDURE — 99214 OFFICE O/P EST MOD 30 MIN: CPT | Performed by: INTERNAL MEDICINE

## 2020-06-04 RX ORDER — ALBUTEROL SULFATE 2.5 MG/3ML
2.5 SOLUTION RESPIRATORY (INHALATION) EVERY 4 HOURS PRN
Qty: 30 BULLET | Refills: 3 | Status: SHIPPED | OUTPATIENT
Start: 2020-06-04 | End: 2022-01-07

## 2020-06-04 NOTE — PROGRESS NOTES
"Chief Complaint   Patient presents with   • COPD     follow up, last OV 08/15/2019     HPI: This patient is an 79 y.o. Female who returns for COPD.  She is a lifelong non-smoker however had significant secondhand smoke exposures working in a [employer] for 30 years. She was diagnosed with asthma at the age of 17 which was in remission up until the past decade.  Respiratory triggers include environmental allergies, URIs and \"stress\".  She had been on ICS/LABA inhaler (Advair) however this was switched to Spiriva in recent years.  She denies worsening dyspnea, cough, wheezing or chest tightness.  She did not notice any perceptible difference between using ICS/LABA versus Spiriva. Denies PRITESH use.  Denies AECOPD.  Pulmonary function testing show moderate COPD with FEV1 1.28 L or 66%, FEV1/FVC: 63%.  Diffusion capacity is normal at 114%.    Chest CAT scan June 6, 2019 showed subpleural nodules, all subcentimeter stable from 2017 consistent with benign etiology.      Past Medical History:   Diagnosis Date   • Allergic rhinitis    • Arthritis    • ASTHMA    • Bronchitis    • COPD (chronic obstructive pulmonary disease) (HCC)    • Daytime sleepiness    • Depression    • H/O varicose vein ligation 3/26/2014   • History of breast cancer 3/26/2014   • Hyperthyroidism    • Intention tremor 9/28/2016   • Lumbar disc disease 3/26/2014   • Migraines 3/26/2014   • Obesity    • Pneumonia    • Pulmonary emphysema (HCC)    • Rheumatoid arthritis (HCC)    • Rheumatoid factor positive 3/26/2014    -CCP, Negative hepatitis panel     • Ringing in ears    • Shortness of breath    • Unspecified disorder of thyroid    • Wears glasses        Social History     Socioeconomic History   • Marital status:      Spouse name: Not on file   • Number of children: Not on file   • Years of education: Not on file   • Highest education level: Not on file   Occupational History   • Not on file   Social Needs   • Financial resource strain: Not on file "   • Food insecurity     Worry: Not on file     Inability: Not on file   • Transportation needs     Medical: Not on file     Non-medical: Not on file   Tobacco Use   • Smoking status: Never Smoker   • Smokeless tobacco: Never Used   Substance and Sexual Activity   • Alcohol use: Yes     Alcohol/week: 1.8 - 2.4 oz     Types: 3 - 4 Glasses of wine per week     Frequency: 2-3 times a week     Drinks per session: 1 or 2     Comment: 3-4   • Drug use: No   • Sexual activity: Not Currently   Lifestyle   • Physical activity     Days per week: Not on file     Minutes per session: Not on file   • Stress: Not on file   Relationships   • Social connections     Talks on phone: Not on file     Gets together: Not on file     Attends Hinduism service: Not on file     Active member of club or organization: Not on file     Attends meetings of clubs or organizations: Not on file     Relationship status: Not on file   • Intimate partner violence     Fear of current or ex partner: Not on file     Emotionally abused: Not on file     Physically abused: Not on file     Forced sexual activity: Not on file   Other Topics Concern   •  Service No   • Blood Transfusions No   • Caffeine Concern No   • Occupational Exposure No   • Hobby Hazards No   • Sleep Concern No   • Stress Concern Yes   • Weight Concern No   • Special Diet No   • Back Care No   • Exercise No   • Bike Helmet No   • Seat Belt Yes   • Self-Exams No   Social History Narrative   • Not on file       Family History   Problem Relation Age of Onset   • Cancer Mother         breast   • Lung Disease Father    • Cancer Sister         breast, pancreatic   • Cancer Brother    • Cancer Maternal Aunt    • Cancer Maternal Grandmother        Current Outpatient Medications on File Prior to Visit   Medication Sig Dispense Refill   • albuterol (PROAIR HFA) 108 (90 Base) MCG/ACT Aero Soln inhalation aerosol Inhale 1-2 Puffs by mouth every 6 hours as needed for Shortness of Breath. 1  "Inhaler 3   • Tiotropium Bromide Monohydrate (SPIRIVA RESPIMAT) 2.5 MCG/ACT Aero Soln Inhale 2 Puffs by mouth every day. 1 Inhaler 11   • thyroid (ARMOUR THYROID) 60 MG Tab Take 1 Tab by mouth every day. 90 Tab 3   • triamterene-hctz (MAXZIDE-25/DYAZIDE) 37.5-25 MG Tab TAKE 1 TABLET BY MOUTH EVERY DAY 90 Tab 1   • Cholecalciferol (VITAMIND D3) 1000 UNIT Tab Take 1,000 Units by mouth every day.     • Misc. Devices Misc 2L oxygen via nasal cannula at night while sleeping 1 Units 0   • Acetaminophen (TYLENOL ARTHRITIS PAIN PO) Take 650 mg by mouth 2 Times a Day.     • Cyanocobalamin (VITAMIN B-12 PO) Take  by mouth every day.     • Multiple Vitamin (MULTIVITAMIN PO) Take 1 Tab by mouth every day.       No current facility-administered medications on file prior to visit.        Allergies: Asa [aspirin]; Quinine; and Sulfa drugs    ROS:   Constitutional: Denies fevers, chills, night sweats, fatigue or weight loss  Eyes: Denies vision loss, pain, drainage, double vision  Ears, Nose, Throat: Denies earache, difficulty hearing, tinnitus, nasal congestion, hoarseness  Cardiovascular: Denies chest pain, tightness, palpitations, orthopnea or edema  Respiratory:As in HPISleep: Denies daytime sleepiness, snoring, apneas, insomnia, morning headaches  GI: Denies heartburn, dysphagia, nausea, abdominal pain, diarrhea or constipation  : Denies frequent urination, hematuria, discharge or painful urination  Musculoskeletal: +back pain, painful joints, sore muscles  Neurological: Denies weakness or headaches  Skin: No rashes    /66 (BP Location: Right arm, Patient Position: Sitting, BP Cuff Size: Adult)   Pulse 85   Resp 16   Ht 1.626 m (5' 4\")   Wt 72.1 kg (159 lb)   SpO2 93%     Physical Exam:  Appearance: Well-nourished, well-developed, in no acute distress  HEENT: Normocephalic, atraumatic, white sclera, PERRLA, oropharynx clear  Neck: No adenopathy or masses  Respiratory: no intercostal retractions or accessory muscle " use  Lungs auscultation: Clear to auscultation bilaterally, distant BS  Cardiovascular: Regular rate rhythm. No murmurs, rubs or gallops.  No LE edema  Abdomen: soft, nondistended  Gait: Normal  Digits: No clubbing, cyanosis  Motor: No focal deficits  Orientation: Oriented to time, person and place    Diagnosis:  1. Chronic obstructive pulmonary disease, unspecified COPD type (HCC)     2. Chronic respiratory failure with hypoxia (HCC)         Plan:  The patient has moderate COPD, clinically stable on Spiriva inhaler.  We discussed keeping a nebulizer on hand to use for acute exacerbations.  She was interested in a nebulizer.  Pulmonary  nodules are consistent with benign lesions.  No further surveillance required.  Influenza vaccine in the fall.  Return in about 6 months (around 12/4/2020).

## 2020-07-14 DIAGNOSIS — E43 EDEMA DUE TO MALNUTRITION, DUE TO UNSPECIFIED MALNUTRITION TYPE (HCC): ICD-10-CM

## 2020-07-14 RX ORDER — TRIAMTERENE AND HYDROCHLOROTHIAZIDE 37.5; 25 MG/1; MG/1
1 TABLET ORAL
Qty: 90 TAB | Refills: 1 | Status: SHIPPED | OUTPATIENT
Start: 2020-07-14 | End: 2020-09-24 | Stop reason: SDUPTHER

## 2020-09-24 ENCOUNTER — OFFICE VISIT (OUTPATIENT)
Dept: MEDICAL GROUP | Facility: MEDICAL CENTER | Age: 80
End: 2020-09-24
Payer: MEDICARE

## 2020-09-24 VITALS
OXYGEN SATURATION: 96 % | HEART RATE: 64 BPM | TEMPERATURE: 97.6 F | SYSTOLIC BLOOD PRESSURE: 118 MMHG | RESPIRATION RATE: 16 BRPM | BODY MASS INDEX: 26.98 KG/M2 | WEIGHT: 158 LBS | DIASTOLIC BLOOD PRESSURE: 70 MMHG | HEIGHT: 64 IN

## 2020-09-24 DIAGNOSIS — J44.9 CHRONIC OBSTRUCTIVE PULMONARY DISEASE, UNSPECIFIED COPD TYPE (HCC): ICD-10-CM

## 2020-09-24 DIAGNOSIS — M79.89 LEG SWELLING: ICD-10-CM

## 2020-09-24 DIAGNOSIS — J45.40 MODERATE PERSISTENT ASTHMA WITHOUT COMPLICATION: ICD-10-CM

## 2020-09-24 DIAGNOSIS — M79.7 FIBROMYALGIA: ICD-10-CM

## 2020-09-24 DIAGNOSIS — Z00.00 MEDICARE ANNUAL WELLNESS VISIT, SUBSEQUENT: ICD-10-CM

## 2020-09-24 DIAGNOSIS — Z12.31 ENCOUNTER FOR SCREENING MAMMOGRAM FOR MALIGNANT NEOPLASM OF BREAST: ICD-10-CM

## 2020-09-24 DIAGNOSIS — Z85.3 HX OF BREAST CANCER: ICD-10-CM

## 2020-09-24 DIAGNOSIS — J96.11 CHRONIC RESPIRATORY FAILURE WITH HYPOXIA (HCC): ICD-10-CM

## 2020-09-24 DIAGNOSIS — E03.4 HYPOTHYROIDISM DUE TO ACQUIRED ATROPHY OF THYROID: ICD-10-CM

## 2020-09-24 DIAGNOSIS — Z78.0 POSTMENOPAUSAL: ICD-10-CM

## 2020-09-24 DIAGNOSIS — Z13.820 SCREENING FOR OSTEOPOROSIS: ICD-10-CM

## 2020-09-24 PROBLEM — R91.8 PULMONARY NODULES: Status: RESOLVED | Noted: 2017-06-23 | Resolved: 2020-09-24

## 2020-09-24 PROCEDURE — G0439 PPPS, SUBSEQ VISIT: HCPCS | Performed by: FAMILY MEDICINE

## 2020-09-24 RX ORDER — TRIAMTERENE AND HYDROCHLOROTHIAZIDE 37.5; 25 MG/1; MG/1
1 TABLET ORAL
Qty: 90 TAB | Refills: 3 | Status: SHIPPED | OUTPATIENT
Start: 2020-09-24 | End: 2021-10-10

## 2020-09-24 ASSESSMENT — PATIENT HEALTH QUESTIONNAIRE - PHQ9
5. POOR APPETITE OR OVEREATING: 3 - NEARLY EVERY DAY
CLINICAL INTERPRETATION OF PHQ2 SCORE: 1
SUM OF ALL RESPONSES TO PHQ QUESTIONS 1-9: 5

## 2020-09-24 ASSESSMENT — ACTIVITIES OF DAILY LIVING (ADL): BATHING_REQUIRES_ASSISTANCE: 0

## 2020-09-24 ASSESSMENT — ENCOUNTER SYMPTOMS: GENERAL WELL-BEING: GOOD

## 2020-09-24 NOTE — PROGRESS NOTES
Cc: wellness    HPI:  Magdi Pittman is a 79 y.o. here for Medicare Annual Wellness Visit     Patient Active Problem List    Diagnosis Date Noted   • Moderate persistent asthma without complication 12/13/2018   • Chronic obstructive pulmonary disease (HCC) 09/06/2018   • Fibromyalgia 04/03/2018   • Chronic respiratory failure with hypoxia (HCC) 04/03/2018   • Benign essential tremor 02/22/2018   • Leg swelling 11/02/2017   • Moderate episode of recurrent major depressive disorder (HCC) 09/29/2017   • Hepatic lesion 06/23/2017   • Deformity of left ankle joint 06/23/2017   • Hx of breast cancer 03/21/2017   • Osteoarthritis of hands, bilateral 03/26/2014   • Hypothyroid 03/26/2014       Current Outpatient Medications   Medication Sig Dispense Refill   • triamterene-hctz (MAXZIDE-25/DYAZIDE) 37.5-25 MG Tab Take 1 Tab by mouth every day. 90 Tab 3   • albuterol (PROVENTIL) 2.5mg/3ml Nebu Soln solution for nebulization 3 mL by Nebulization route every four hours as needed for Shortness of Breath. 30 Bullet 3   • albuterol (PROAIR HFA) 108 (90 Base) MCG/ACT Aero Soln inhalation aerosol Inhale 1-2 Puffs by mouth every 6 hours as needed for Shortness of Breath. 1 Inhaler 3   • Tiotropium Bromide Monohydrate (SPIRIVA RESPIMAT) 2.5 MCG/ACT Aero Soln Inhale 2 Puffs by mouth every day. 1 Inhaler 11   • thyroid (ARMOUR THYROID) 60 MG Tab Take 1 Tab by mouth every day. 90 Tab 3   • Cholecalciferol (VITAMIND D3) 1000 UNIT Tab Take 1,000 Units by mouth every day.     • Misc. Devices Misc 2L oxygen via nasal cannula at night while sleeping 1 Units 0   • Acetaminophen (TYLENOL ARTHRITIS PAIN PO) Take 650 mg by mouth 2 Times a Day.     • Cyanocobalamin (VITAMIN B-12 PO) Take  by mouth every day.     • Multiple Vitamin (MULTIVITAMIN PO) Take 1 Tab by mouth every day.       No current facility-administered medications for this visit.             Current supplements as per medication list.       Allergies: Asa [aspirin], Quinine, and  Sulfa drugs    Current social contact/activities: [place], lunches with friends, knitting    She  reports that she has never smoked. She has never used smokeless tobacco. She reports current alcohol use of about 1.8 - 2.4 oz of alcohol per week. She reports that she does not use drugs.  Counseling given: Yes      DPA/Advanced Directive:  Patient has Living will and Durable Power of  on file.     ROS:    Gait: Uses no assistive device  Ostomy: No  Other tubes: No  Amputations: No  Chronic oxygen use: Yes  Last eye exam: 5/2020  Wears hearing aids: No   : Reports urinary leakage during the last 6 months that has somewhat interfered with their daily activities or sleep.    Screening:    POLST/AD    Does the patient have a POLST or Advanced Directive?  Yes    Depression Screening    Little interest or pleasure in doing things?  0 - not at all  Feeling down, depressed , or hopeless? 1 - several days  Trouble falling or staying asleep, or sleeping too much?  0 - not at all  Feeling tired or having little energy?  1 - several days  Poor appetite or overeating?  3 - nearly every day  Feeling bad about yourself - or that you are a failure or have let yourself or your family down? 0 - not at all  Trouble concentrating on things, such as reading the newspaper or watching television? 0 - not at all  Moving or speaking so slowly that other people could have noticed.  Or the opposite - being so fidgety or restless that you have been moving around a lot more than usual?  0 - not at all  Thoughts that you would be better off dead, or of hurting yourself?  0 - not at all  Patient Health Questionnaire Score: 5    If depressive symptoms identified deferred to follow up visit unless specifically addressed in assessment and plan.    Interpretation of PHQ-9 Total Score   Score Severity   1-4 No Depression   5-9 Mild Depression   10-14 Moderate Depression   15-19 Moderately Severe Depression   20-27 Severe  Depression      Screening for Cognitive Impairment    Three Minute Recall (river, nation, finger) 3/3    Demetrius clock face with all 12 numbers and set the hands to show 10 past 11.  Yes    Cognitive concerns identified deferred for follow up unless specifically addressed in assessment and plan.    Fall Risk Assessment    Has the patient had two or more falls in the last year or any fall with injury in the last year?  No    Safety Assessment    Throw rugs on floor.  Yes  Handrails on all stairs.  Yes  Good lighting in all hallways.  Yes  Difficulty hearing.  No  Patient counseled about all safety risks that were identified.    Functional Assessment ADLs    Are there any barriers preventing you from cooking for yourself or meeting nutritional needs?  No.    Are there any barriers preventing you from driving safely or obtaining transportation?  No.    Are there any barriers preventing you from using a telephone or calling for help?  No.    Are there any barriers preventing you from shopping?  No.    Are there any barriers preventing you from taking care of your own finances?  No.    Are there any barriers preventing you from managing your medications?  No.    Are there any barriers preventing you from showering, bathing or dressing yourself? No.    Are you currently engaging in any exercise or physical activity?  Yes.     What is your perception of your health?  Good.    Health Maintenance Summary                BONE DENSITY Overdue 12/20/2003     Annual Wellness Visit Overdue 5/13/2020      Done 5/13/2019 SUBSEQUENT ANNUAL WELLNESS VISIT-INCLUDES PPPS ()     Patient has more history with this topic...    Annual Pulmonary Function Test / Spirometry Overdue 6/20/2020      Not specified 6/20/2019      Patient has more history with this topic...    IMM INFLUENZA Overdue 9/1/2020      Done 12/20/2019 Imm Admin: Influenza Vaccine Adult HD     Patient has more history with this topic...    IMM ZOSTER VACCINES Postponed  10/28/2020 Originally 3/22/2017. System: vaccine not available, other system reasons     Done 1/25/2017 Imm Admin: Zoster Vaccine Live (ZVL) (Zostavax) - HISTORICAL DATA    IMM DTaP/Tdap/Td Vaccine Postponed 12/20/2020 Originally 12/20/1959. Insurance/Financial          Patient Care Team:  Nicole Holbrook M.D. as PCP - General (Family Medicine)  Yi Cureil M.D. (Radiation Therapy)  George Botello D.P.M. (Podiatry)  Catracho Sauceda M.D. (Pulmonary Medicine)  [facility] (DME Supplier)      Social History     Tobacco Use   • Smoking status: Never Smoker   • Smokeless tobacco: Never Used   Substance Use Topics   • Alcohol use: Yes     Alcohol/week: 1.8 - 2.4 oz     Types: 3 - 4 Glasses of wine per week     Frequency: 2-3 times a week     Drinks per session: 1 or 2     Comment: 3-4   • Drug use: No     Family History   Problem Relation Age of Onset   • Cancer Mother         breast   • Lung Disease Father    • Cancer Sister         breast, pancreatic   • Cancer Brother    • Cancer Maternal Aunt    • Cancer Maternal Grandmother      She  has a past medical history of Allergic rhinitis, Arthritis, ASTHMA, Bronchitis, COPD (chronic obstructive pulmonary disease) ([facility]), Daytime sleepiness, Depression, H/O varicose vein ligation (3/26/2014), History of breast cancer (3/26/2014), Hyperthyroidism, Intention tremor (9/28/2016), Lumbar disc disease (3/26/2014), Migraines (3/26/2014), Obesity, Pneumonia, Pulmonary emphysema ([facility]), Pulmonary nodules (6/23/2017), Rheumatoid arthritis ([facility]), Rheumatoid factor positive (3/26/2014), Ringing in ears, Shortness of breath, Unspecified disorder of thyroid, and Wears glasses.   Past Surgical History:   Procedure Laterality Date   • KNEE ARTHROSCOPY Right 2/26/2010    Procedure: WITH DEBRIDEMENT MEDIAL FEMORAL CONDYLE & PATELLA;  Surgeon: Gregorio Sloan M.D.;  Location: SURGERY [facility];  Service:    • MENISCECTOMY Right 2/26/2010    Procedure: PARTIAL LATERAL  ;   "Surgeon: Gregorio Sloan M.D.;  Location: SURGERY [facility];  Service:    • BASAL CELL EXCISION  2009    left leg   • BREAST BIOPSY  1974    left breast   • TUBAL LIGATION  1973   • VEIN LIGATION  1966    bilateral legs   • TONSILLECTOMY  1958   • PB REMOVAL SYNOVIAL CYST,KNEE  1948    right knee       Exam:   /70   Pulse 64   Temp 36.4 °C (97.6 °F)   Resp 16   Ht 1.626 m (5' 4\")   Wt 71.7 kg (158 lb)   SpO2 96%  Body mass index is 27.12 kg/m².    Hearing poor.    Dentition good  Alert, oriented in no acute distress.  Eye contact is good, speech goal directed, affect calm    Assessment and Plan. The following treatment and monitoring plan is recommended:      1. Medicare annual wellness visit, subsequent  - Subsequent Annual Wellness Visit - Includes PPPS ()    2. Chronic obstructive pulmonary disease, unspecified COPD type (HCC)  Stable, continue current inhalers  - Subsequent Annual Wellness Visit - Includes PPPS ()    3. Moderate persistent asthma without complication  Stable, continue current inhalers  - Subsequent Annual Wellness Visit - Includes PPPS ()    4. Chronic respiratory failure with hypoxia (HCC)  Stable, continue oxygen  - Subsequent Annual Wellness Visit - Includes PPPS ()    5. Hypothyroidism due to acquired atrophy of thyroid  Stable, continue levothyroxine  - Subsequent Annual Wellness Visit - Includes PPPS ()    6. Leg swelling  Stable, continue triamterene-hydrochlorothiazide  - triamterene-hctz (MAXZIDE-25/DYAZIDE) 37.5-25 MG Tab; Take 1 Tab by mouth every day.  Dispense: 90 Tab; Refill: 3  - Subsequent Annual Wellness Visit - Includes PPPS ()  - Basic Metabolic Panel; Future    7. Fibromyalgia  Stable, continue to monitor  - Subsequent Annual Wellness Visit - Includes PPPS ()    8. Hx of breast cancer  Stable, continue to monitor  - Subsequent Annual Wellness Visit - Includes PPPS ()    9. Postmenopausal  Stable, is due for DEXA  - " Subsequent Annual Wellness Visit - Includes PPPS ()  - DS-BONE DENSITY STUDY (DEXA); Future    10. Screening for osteoporosis  Stable, is due for DEXA  - Subsequent Annual Wellness Visit - Includes PPPS ()  - DS-BONE DENSITY STUDY (DEXA); Future    11. Encounter for screening mammogram for malignant neoplasm of breast  Stable, is due for mammogram  - Subsequent Annual Wellness Visit - Includes PPPS ()  - MA-SCREENING MAMMO BILAT W/TOMOSYNTHESIS W/CAD; Future      Services suggested: No services needed at this time  Health Care Screening: Age-appropriate preventive services recommended by USPTF and ACIP covered by Medicare were discussed today. Services ordered if indicated and agreed upon by the patient.  Referrals offered: Community-based lifestyle interventions to reduce health risks and promote self-management and wellness, fall prevention, nutrition, physical activity, tobacco-use cessation, weight loss, and mental health services as per orders if indicated.    Discussion today about general wellness and lifestyle habits:    · Prevent falls and reduce trip hazards; Cautioned about securing or removing rugs.  · Have a working fire alarm and carbon monoxide detector;   · Engage in regular physical activity and social activities     Follow-up: Return in about 4 months (around 1/24/2021) for Med check.

## 2020-12-03 ENCOUNTER — APPOINTMENT (OUTPATIENT)
Dept: SLEEP MEDICINE | Facility: MEDICAL CENTER | Age: 80
End: 2020-12-03
Payer: MEDICARE

## 2021-01-11 DIAGNOSIS — Z23 NEED FOR VACCINATION: ICD-10-CM

## 2021-01-19 ENCOUNTER — OFFICE VISIT (OUTPATIENT)
Dept: SLEEP MEDICINE | Facility: MEDICAL CENTER | Age: 81
End: 2021-01-19
Payer: MEDICARE

## 2021-01-19 VITALS
DIASTOLIC BLOOD PRESSURE: 62 MMHG | BODY MASS INDEX: 27.69 KG/M2 | RESPIRATION RATE: 16 BRPM | HEIGHT: 64 IN | OXYGEN SATURATION: 97 % | WEIGHT: 162.2 LBS | HEART RATE: 77 BPM | SYSTOLIC BLOOD PRESSURE: 126 MMHG

## 2021-01-19 DIAGNOSIS — J44.9 CHRONIC OBSTRUCTIVE PULMONARY DISEASE, UNSPECIFIED COPD TYPE (HCC): ICD-10-CM

## 2021-01-19 DIAGNOSIS — J96.11 CHRONIC RESPIRATORY FAILURE WITH HYPOXIA (HCC): ICD-10-CM

## 2021-01-19 DIAGNOSIS — R91.8 PULMONARY NODULES: ICD-10-CM

## 2021-01-19 PROCEDURE — 99214 OFFICE O/P EST MOD 30 MIN: CPT | Performed by: INTERNAL MEDICINE

## 2021-01-19 NOTE — PROGRESS NOTES
"Chief Complaint   Patient presents with   • Follow-Up     Last seen 6/4/20 by Dr. Juarez for COPD, and chronic respiratory failure with hypoxia.       HPI: This patient is an 80 y.o. Female who returns for COPD.  She is a lifelong non-smoker however had significant secondhand smoke exposures working in a [employer] for 30 years. She was diagnosed with asthma at the age of 17.  Respiratory triggers include environmental allergies, URIs and \"stress\".  She had been on ICS/LABA inhaler (Advair) however this was switched to Spiriva in recent years.  She denies worsening dyspnea, cough, wheezing or chest tightness.  She did not notice any perceptible difference between using ICS/LABA versus Spiriva. Denies PRITESH use.  Denies AECOPD over the past year.  Pulmonary function testing show moderate COPD with FEV1 1.28 L or 66%, FEV1/FVC: 63%.  Diffusion capacity is normal at 114%.    Chest CAT scan June 6, 2019 showed subpleural nodules, all subcentimeter stable from 2017 consistent with benign etiology.    Past Medical History:   Diagnosis Date   • Allergic rhinitis    • Arthritis    • ASTHMA    • Bronchitis    • COPD (chronic obstructive pulmonary disease) (HCC)    • Daytime sleepiness    • Depression    • H/O varicose vein ligation 3/26/2014   • History of breast cancer 3/26/2014   • Hyperthyroidism    • Intention tremor 9/28/2016   • Lumbar disc disease 3/26/2014   • Migraines 3/26/2014   • Obesity    • Pneumonia    • Pulmonary emphysema (HCC)    • Pulmonary nodules 6/23/2017   • Rheumatoid arthritis (HCC)    • Rheumatoid factor positive 3/26/2014    -CCP, Negative hepatitis panel     • Ringing in ears    • Shortness of breath    • Unspecified disorder of thyroid    • Wears glasses        Social History     Socioeconomic History   • Marital status:      Spouse name: Not on file   • Number of children: Not on file   • Years of education: Not on file   • Highest education level: Not on file   Occupational History   • Not on " file   Social Needs   • Financial resource strain: Not on file   • Food insecurity     Worry: Not on file     Inability: Not on file   • Transportation needs     Medical: Not on file     Non-medical: Not on file   Tobacco Use   • Smoking status: Never Smoker   • Smokeless tobacco: Never Used   Substance and Sexual Activity   • Alcohol use: Yes     Alcohol/week: 1.8 - 2.4 oz     Types: 3 - 4 Glasses of wine per week     Frequency: 2-3 times a week     Drinks per session: 1 or 2     Comment: 3-4   • Drug use: No   • Sexual activity: Not Currently   Lifestyle   • Physical activity     Days per week: Not on file     Minutes per session: Not on file   • Stress: Not on file   Relationships   • Social connections     Talks on phone: Not on file     Gets together: Not on file     Attends [employer] service: Not on file     Active member of club or organization: Not on file     Attends meetings of clubs or organizations: Not on file     Relationship status: Not on file   • Intimate partner violence     Fear of current or ex partner: Not on file     Emotionally abused: Not on file     Physically abused: Not on file     Forced sexual activity: Not on file   Other Topics Concern   •  Service No   • Blood Transfusions No   • Caffeine Concern No   • Occupational Exposure No   • Hobby Hazards No   • Sleep Concern No   • Stress Concern Yes   • Weight Concern No   • Special Diet No   • Back Care No   • Exercise No   • Bike Helmet No   • Seat Belt Yes   • Self-Exams No   Social History Narrative   • Not on file       Family History   Problem Relation Age of Onset   • Cancer Mother         breast   • Lung Disease Father    • Cancer Sister         breast, pancreatic   • Cancer Brother    • Cancer Maternal Aunt    • Cancer Maternal Grandmother        Current Outpatient Medications on File Prior to Visit   Medication Sig Dispense Refill   • triamterene-hctz (MAXZIDE-25/DYAZIDE) 37.5-25 MG Tab Take 1 Tab by mouth every day. 90 Tab  "3   • albuterol (PROVENTIL) 2.5mg/3ml Nebu Soln solution for nebulization 3 mL by Nebulization route every four hours as needed for Shortness of Breath. 30 Bullet 3   • albuterol (PROAIR HFA) 108 (90 Base) MCG/ACT Aero Soln inhalation aerosol Inhale 1-2 Puffs by mouth every 6 hours as needed for Shortness of Breath. 1 Inhaler 3   • Tiotropium Bromide Monohydrate (SPIRIVA RESPIMAT) 2.5 MCG/ACT Aero Soln Inhale 2 Puffs by mouth every day. 1 Inhaler 11   • thyroid (ARMOUR THYROID) 60 MG Tab Take 1 Tab by mouth every day. 90 Tab 3   • Cholecalciferol (VITAMIND D3) 1000 UNIT Tab Take 1,000 Units by mouth every day.     • Misc. Devices Misc 2L oxygen via nasal cannula at night while sleeping 1 Units 0   • Acetaminophen (TYLENOL ARTHRITIS PAIN PO) Take 650 mg by mouth 2 Times a Day.     • Cyanocobalamin (VITAMIN B-12 PO) Take  by mouth every day.     • Multiple Vitamin (MULTIVITAMIN PO) Take 1 Tab by mouth every day.       No current facility-administered medications on file prior to visit.        Allergies: Asa [aspirin], Quinine, and Sulfa drugs    ROS:   Constitutional: Denies fevers, chills, night sweats, fatigue or weight loss  Eyes: Denies vision loss, pain, drainage, double vision  Ears, Nose, Throat: Denies earache, difficulty hearing, tinnitus, nasal congestion, hoarseness  Cardiovascular: Denies chest pain, tightness, palpitations, orthopnea or edema  Respiratory: As in HPI  Sleep: Denies daytime sleepiness, snoring, apneas, insomnia, morning headaches  GI: Denies heartburn, dysphagia, nausea, abdominal pain, diarrhea or constipation  : Denies frequent urination, hematuria, discharge or painful urination  Musculoskeletal: Denies back pain, painful joints, sore muscles  Neurological: Denies weakness or headaches  Skin: No rashes    /62 (BP Location: Right arm, Patient Position: Sitting, BP Cuff Size: Adult)   Pulse 77   Resp 16   Ht 1.626 m (5' 4\")   Wt 73.6 kg (162 lb 3.2 oz)   SpO2 97%     Physical " Exam:  Appearance: Well-nourished, well-developed, in no acute distress  HEENT: Normocephalic, atraumatic, white sclera, PERRLA, masked  Neck: No adenopathy or masses  Respiratory: no intercostal retractions or accessory muscle use  Lungs auscultation: Clear to auscultation bilaterally, dominished BS  Cardiovascular: Regular rate rhythm. No murmurs, rubs or gallops.  No LE edema  Abdomen: soft, nondistended  Gait: Normal  Digits: No clubbing, cyanosis  Motor: No focal deficits  Orientation: Oriented to time, person and place    Diagnosis:  1. Chronic obstructive pulmonary disease, unspecified COPD type (HCC)  PULMONARY FUNCTION TESTS -Test requested: Complete Pulmonary Function Test   2. Chronic respiratory failure with hypoxia (HCC)     3. Pulmonary nodules         Plan:  Patient's COPD is clinically stable.  Continue Spiriva inhaler.  Update pulmonary function testing in 6 months.  COVID vaccine encouraged.  Pulmonary  nodules are consistent with benign lesions.  No further surveillance required  Return in about 6 months (around 7/19/2021) for with PFT.

## 2021-01-21 ENCOUNTER — OFFICE VISIT (OUTPATIENT)
Dept: MEDICAL GROUP | Facility: MEDICAL CENTER | Age: 81
End: 2021-01-21
Payer: MEDICARE

## 2021-01-21 VITALS
TEMPERATURE: 97.6 F | RESPIRATION RATE: 16 BRPM | DIASTOLIC BLOOD PRESSURE: 70 MMHG | SYSTOLIC BLOOD PRESSURE: 122 MMHG | BODY MASS INDEX: 26.63 KG/M2 | OXYGEN SATURATION: 96 % | HEIGHT: 64 IN | HEART RATE: 88 BPM | WEIGHT: 156 LBS

## 2021-01-21 DIAGNOSIS — M79.89 LEG SWELLING: ICD-10-CM

## 2021-01-21 DIAGNOSIS — E03.4 HYPOTHYROIDISM DUE TO ACQUIRED ATROPHY OF THYROID: ICD-10-CM

## 2021-01-21 DIAGNOSIS — Z23 NEED FOR VACCINATION: ICD-10-CM

## 2021-01-21 DIAGNOSIS — R73.9 HYPERGLYCEMIA: ICD-10-CM

## 2021-01-21 DIAGNOSIS — G25.0 BENIGN ESSENTIAL TREMOR: ICD-10-CM

## 2021-01-21 PROCEDURE — G0008 ADMIN INFLUENZA VIRUS VAC: HCPCS | Performed by: FAMILY MEDICINE

## 2021-01-21 PROCEDURE — 90662 IIV NO PRSV INCREASED AG IM: CPT | Performed by: FAMILY MEDICINE

## 2021-01-21 PROCEDURE — 99214 OFFICE O/P EST MOD 30 MIN: CPT | Mod: 25 | Performed by: FAMILY MEDICINE

## 2021-01-21 RX ORDER — THYROID 60 MG/1
60 TABLET ORAL DAILY
Qty: 90 TAB | Refills: 3 | Status: SHIPPED | OUTPATIENT
Start: 2021-01-21 | End: 2022-01-30

## 2021-01-21 NOTE — PROGRESS NOTES
cc: Hypothyroidism    Subjective:     Magdi Pittman is a 80 y.o. female presenting follow-up.  Reports being stable since her last visit.  She has been watching her diet carefully.  She has also been monitoring her alcohol intake.  She has not had a drink in the past several days.  She normally has about 1 drink a day.  She does have a tremor, which improves when she does drink alcohol.  She has tried propranolol in the past, is not interested in trying another medication.  Denies any chest pain, shortness of breath, lightheadedness, dizziness.  Leg swelling has been stable    Review of systems:  See above.       Current Outpatient Medications:   •  thyroid (ARMOUR THYROID) 60 MG Tab, Take 1 Tab by mouth every day., Disp: 90 Tab, Rfl: 3  •  triamterene-hctz (MAXZIDE-25/DYAZIDE) 37.5-25 MG Tab, Take 1 Tab by mouth every day., Disp: 90 Tab, Rfl: 3  •  albuterol (PROVENTIL) 2.5mg/3ml Nebu Soln solution for nebulization, 3 mL by Nebulization route every four hours as needed for Shortness of Breath., Disp: 30 Bullet, Rfl: 3  •  albuterol (PROAIR HFA) 108 (90 Base) MCG/ACT Aero Soln inhalation aerosol, Inhale 1-2 Puffs by mouth every 6 hours as needed for Shortness of Breath., Disp: 1 Inhaler, Rfl: 3  •  Tiotropium Bromide Monohydrate (SPIRIVA RESPIMAT) 2.5 MCG/ACT Aero Soln, Inhale 2 Puffs by mouth every day., Disp: 1 Inhaler, Rfl: 11  •  Cholecalciferol (VITAMIND D3) 1000 UNIT Tab, Take 1,000 Units by mouth every day., Disp: , Rfl:   •  Misc. Devices Misc, 2L oxygen via nasal cannula at night while sleeping, Disp: 1 Units, Rfl: 0  •  Acetaminophen (TYLENOL ARTHRITIS PAIN PO), Take 650 mg by mouth 2 Times a Day., Disp: , Rfl:   •  Cyanocobalamin (VITAMIN B-12 PO), Take  by mouth every day., Disp: , Rfl:   •  Multiple Vitamin (MULTIVITAMIN PO), Take 1 Tab by mouth every day., Disp: , Rfl:     Allergies, past medical history, past surgical history, family history, social history reviewed and updated    Objective:  "    Vitals: /70   Pulse 88   Temp 36.4 °C (97.6 °F)   Resp 16   Ht 1.626 m (5' 4\")   Wt 70.8 kg (156 lb)   SpO2 96%   BMI 26.78 kg/m²   General: Alert, pleasant, NAD  HEENT: Normocephalic.    Heart: Regular rate and rhythm.  S1 and S2 normal.  No murmurs appreciated.  Respiratory: Normal respiratory effort.  Clear to auscultation bilaterally.  Abdomen: Non-distended, soft  Skin: Warm, dry  Psych:  Affect/mood is normal, judgement is good, memory is intact, grooming is appropriate.    Assessment/Plan:     Magdi was seen today for follow-up.    Diagnoses and all orders for this visit:    Hypothyroidism due to acquired atrophy of thyroid  Stable, will be due for labs at her next visit.  Continue [place] Thyroid  -     TSH; Future    Leg swelling  Stable, continue triamterene-hydrochlorothiazide  -     Basic Metabolic Panel; Future    Benign essential tremor  Stable, continue to monitor    Hyperglycemia  Stable, continue to monitor  -     HEMOGLOBIN A1C; Future    Need for vaccination  -     Influenza Vaccine, High Dose (65+ Only)          Return in about 4 months (around 5/21/2021) for routine follow up.    "

## 2021-03-08 ENCOUNTER — HOSPITAL ENCOUNTER (OUTPATIENT)
Dept: LAB | Facility: MEDICAL CENTER | Age: 81
End: 2021-03-08
Attending: FAMILY MEDICINE
Payer: MEDICARE

## 2021-03-08 DIAGNOSIS — M79.89 LEG SWELLING: ICD-10-CM

## 2021-03-08 DIAGNOSIS — E03.4 HYPOTHYROIDISM DUE TO ACQUIRED ATROPHY OF THYROID: ICD-10-CM

## 2021-03-08 DIAGNOSIS — R73.9 HYPERGLYCEMIA: ICD-10-CM

## 2021-03-08 LAB
ANION GAP SERPL CALC-SCNC: 13 MMOL/L (ref 7–16)
BUN SERPL-MCNC: 25 MG/DL (ref 8–22)
CALCIUM SERPL-MCNC: 10 MG/DL (ref 8.5–10.5)
CHLORIDE SERPL-SCNC: 98 MMOL/L (ref 96–112)
CO2 SERPL-SCNC: 24 MMOL/L (ref 20–33)
CREAT SERPL-MCNC: 0.69 MG/DL (ref 0.5–1.4)
EST. AVERAGE GLUCOSE BLD GHB EST-MCNC: 108 MG/DL
FASTING STATUS PATIENT QL REPORTED: NORMAL
GLUCOSE SERPL-MCNC: 98 MG/DL (ref 65–99)
HBA1C MFR BLD: 5.4 % (ref 4–5.6)
POTASSIUM SERPL-SCNC: 3.9 MMOL/L (ref 3.6–5.5)
SODIUM SERPL-SCNC: 135 MMOL/L (ref 135–145)

## 2021-03-08 PROCEDURE — 83036 HEMOGLOBIN GLYCOSYLATED A1C: CPT | Mod: GA

## 2021-03-08 PROCEDURE — 80048 BASIC METABOLIC PNL TOTAL CA: CPT

## 2021-03-08 PROCEDURE — 84443 ASSAY THYROID STIM HORMONE: CPT

## 2021-03-08 PROCEDURE — 36415 COLL VENOUS BLD VENIPUNCTURE: CPT | Mod: GA

## 2021-03-09 LAB — TSH SERPL DL<=0.005 MIU/L-ACNC: 1.34 UIU/ML (ref 0.38–5.33)

## 2021-04-06 ENCOUNTER — NURSE TRIAGE (OUTPATIENT)
Dept: HEALTH INFORMATION MANAGEMENT | Facility: OTHER | Age: 81
End: 2021-04-06

## 2021-04-06 NOTE — TELEPHONE ENCOUNTER
Pt was transferred by  to be cleared for office visit, pt denies any symptoms, has a 'slight chronic cough' related to COPD.  1. Caller Name: Magdi Pittman                 Call Back Number: [phone]  [facility] PCP or Specialty Provider: Yes Nicole Holbrook        2. Has the patient previously tested positive for COVID-19? No    3.  In the last two weeks, has the patient had any new or worsening symptoms (not explained by alternative diagnosis)? No.    4.  Does patient have any comoribidities? COPD    5.  Has the patient had any known contact with someone who is suspected or confirmed to have COVID-19? No.    5. Disposition: Cleared by RN Triage as potential is low for COVID-19; OK to keep/schedule appointment    Note routed to [facility] Provider: FYI only.

## 2021-05-14 ENCOUNTER — HOSPITAL ENCOUNTER (OUTPATIENT)
Dept: RADIOLOGY | Facility: MEDICAL CENTER | Age: 81
End: 2021-05-14

## 2021-05-14 ENCOUNTER — HOSPITAL ENCOUNTER (OUTPATIENT)
Dept: RADIOLOGY | Facility: MEDICAL CENTER | Age: 81
End: 2021-05-14
Attending: FAMILY MEDICINE
Payer: MEDICARE

## 2021-05-14 DIAGNOSIS — Z13.820 SCREENING FOR OSTEOPOROSIS: ICD-10-CM

## 2021-05-14 DIAGNOSIS — Z12.31 ENCOUNTER FOR SCREENING MAMMOGRAM FOR MALIGNANT NEOPLASM OF BREAST: ICD-10-CM

## 2021-05-14 DIAGNOSIS — Z78.0 POSTMENOPAUSAL: ICD-10-CM

## 2021-05-14 PROCEDURE — 77063 BREAST TOMOSYNTHESIS BI: CPT

## 2021-05-14 PROCEDURE — 77080 DXA BONE DENSITY AXIAL: CPT

## 2021-05-20 ENCOUNTER — OFFICE VISIT (OUTPATIENT)
Dept: MEDICAL GROUP | Facility: MEDICAL CENTER | Age: 81
End: 2021-05-20
Payer: MEDICARE

## 2021-05-20 VITALS
OXYGEN SATURATION: 94 % | WEIGHT: 154 LBS | SYSTOLIC BLOOD PRESSURE: 126 MMHG | BODY MASS INDEX: 26.29 KG/M2 | TEMPERATURE: 97.8 F | DIASTOLIC BLOOD PRESSURE: 78 MMHG | HEART RATE: 74 BPM | HEIGHT: 64 IN | RESPIRATION RATE: 16 BRPM

## 2021-05-20 DIAGNOSIS — M79.7 FIBROMYALGIA: ICD-10-CM

## 2021-05-20 DIAGNOSIS — J45.40 MODERATE PERSISTENT ASTHMA WITHOUT COMPLICATION: ICD-10-CM

## 2021-05-20 DIAGNOSIS — M21.962 DEFORMITY OF LEFT ANKLE JOINT: ICD-10-CM

## 2021-05-20 DIAGNOSIS — G25.0 BENIGN ESSENTIAL TREMOR: ICD-10-CM

## 2021-05-20 DIAGNOSIS — Z85.3 HX OF BREAST CANCER: ICD-10-CM

## 2021-05-20 DIAGNOSIS — F33.1 MODERATE EPISODE OF RECURRENT MAJOR DEPRESSIVE DISORDER (HCC): ICD-10-CM

## 2021-05-20 DIAGNOSIS — J44.9 CHRONIC OBSTRUCTIVE PULMONARY DISEASE, UNSPECIFIED COPD TYPE (HCC): ICD-10-CM

## 2021-05-20 DIAGNOSIS — E03.4 HYPOTHYROIDISM DUE TO ACQUIRED ATROPHY OF THYROID: ICD-10-CM

## 2021-05-20 DIAGNOSIS — J96.11 CHRONIC RESPIRATORY FAILURE WITH HYPOXIA (HCC): ICD-10-CM

## 2021-05-20 PROCEDURE — 99214 OFFICE O/P EST MOD 30 MIN: CPT | Performed by: FAMILY MEDICINE

## 2021-05-20 RX ORDER — PROPRANOLOL HCL 60 MG
60 CAPSULE, EXTENDED RELEASE 24HR ORAL DAILY
Qty: 90 CAPSULE | Refills: 1 | Status: SHIPPED | OUTPATIENT
Start: 2021-05-20 | End: 2021-07-08

## 2021-05-20 NOTE — PROGRESS NOTES
Subjective:     Magdi Pittman is a 80 y.o. female presenting for:    1.  Tremors: Has been somewhat worsening since her last visit.  She notices it in her hands bilaterally when she is holding something or doing an activity.  Improves with alcohol intake.  She averages about 1 drink per night no tremors at rest.  Is willing to retry propranolol again.    2.  Imbalance: Has been somewhat worsening.  She has been having some vision changes as well, plans to see her eye doctor in June.  Is wondering if she can get a walker.  She plans to start exercising at her new living facility, planning to try the fidel chi once a week.    3.  Fibromyalgia, chronic pain: Has been stable.  Has an appointment with her physiatrist month.        Current Outpatient Medications:   •  propranolol LA (INDERAL LA) 60 MG CAPSULE SR 24 HR, Take 1 capsule by mouth every day., Disp: 90 capsule, Rfl: 1  •  thyroid (ARMOUR THYROID) 60 MG Tab, Take 1 Tab by mouth every day., Disp: 90 Tab, Rfl: 3  •  triamterene-hctz (MAXZIDE-25/DYAZIDE) 37.5-25 MG Tab, Take 1 Tab by mouth every day., Disp: 90 Tab, Rfl: 3  •  albuterol (PROVENTIL) 2.5mg/3ml Nebu Soln solution for nebulization, 3 mL by Nebulization route every four hours as needed for Shortness of Breath., Disp: 30 Bullet, Rfl: 3  •  albuterol (PROAIR HFA) 108 (90 Base) MCG/ACT Aero Soln inhalation aerosol, Inhale 1-2 Puffs by mouth every 6 hours as needed for Shortness of Breath., Disp: 1 Inhaler, Rfl: 3  •  Tiotropium Bromide Monohydrate (SPIRIVA RESPIMAT) 2.5 MCG/ACT Aero Soln, Inhale 2 Puffs by mouth every day., Disp: 1 Inhaler, Rfl: 11  •  Cholecalciferol (VITAMIND D3) 1000 UNIT Tab, Take 1,000 Units by mouth every day., Disp: , Rfl:   •  Misc. Devices Misc, 2L oxygen via nasal cannula at night while sleeping, Disp: 1 Units, Rfl: 0  •  Acetaminophen (TYLENOL ARTHRITIS PAIN PO), Take 650 mg by mouth 2 Times a Day., Disp: , Rfl:   •  Cyanocobalamin (VITAMIN B-12 PO), Take  by mouth every day.,  "Disp: , Rfl:   •  Multiple Vitamin (MULTIVITAMIN PO), Take 1 Tab by mouth every day., Disp: , Rfl:     Objective:     Vitals: /78   Pulse 74   Temp 36.6 °C (97.8 °F)   Resp 16   Ht 1.626 m (5' 4\")   Wt 69.9 kg (154 lb)   SpO2 94%   BMI 26.43 kg/m²   General: Alert  HEENT: Normocephalic.   Heart: Regular rate and rhythm.  S1 and S2 normal.  No murmurs appreciated.  Respiratory: Normal respiratory effort.  Clear to auscultation bilaterally.  Abdomen: Non-distended, soft  Extremities: No leg edema.    Walker   Patient has a mobility limitation that impairs their ability to participate in one or more of their mobility related activities of daily living.    The mobility limitation cannot sufficiently be resolved by the use of a cane or crutches and the patient is able to safely use a walker.    The functional mobility deficit can be sufficiently resolved with use of a walker.      Assessment/Plan:     Magdi was seen today for follow-up.    Diagnoses and all orders for this visit:    Hypothyroidism due to acquired atrophy of thyroid  Chronic, stable.  Labs are up-to-date    Hx of breast cancer  Chronic, stable, continue to monitor    Fibromyalgia  Deformity of left ankle joint  Chronic, stable, seeing physiatry        - Misc. Devices Misc; One 4-wheeled walker with hand brakes and a seat.  Patient height 5 foot 4 inches  Dispense: 1 Each; Refill: 0    Benign essential tremor  Chronic, stable.  We will try a course of propranolol  -     propranolol LA (INDERAL LA) 60 MG CAPSULE SR 24 HR; Take 1 capsule by mouth every day.    Chronic obstructive pulmonary disease, unspecified COPD type (HCC)  Moderate persistent asthma without complication  Chronic respiratory failure with hypoxia (HCC)  Chronic, stable, continue current inhalers, oxygen        - Misc. Devices Misc; One 4-wheeled walker with hand brakes and a seat.  Patient height 5 foot 4 inches  Dispense: 1 Each; Refill: 0    Moderate episode of recurrent " major depressive disorder (HCC)  Chronic, stable, continue to monitor      Return in about 4 months (around 9/20/2021) for routine follow up.

## 2021-06-15 ENCOUNTER — OFFICE VISIT (OUTPATIENT)
Dept: PHYSICAL MEDICINE AND REHAB | Facility: MEDICAL CENTER | Age: 81
End: 2021-06-15
Payer: MEDICARE

## 2021-06-15 VITALS
TEMPERATURE: 98.4 F | SYSTOLIC BLOOD PRESSURE: 132 MMHG | HEART RATE: 94 BPM | HEIGHT: 64 IN | WEIGHT: 158.07 LBS | OXYGEN SATURATION: 96 % | DIASTOLIC BLOOD PRESSURE: 62 MMHG | BODY MASS INDEX: 26.99 KG/M2

## 2021-06-15 DIAGNOSIS — M79.7 FIBROMYALGIA: ICD-10-CM

## 2021-06-15 DIAGNOSIS — M54.50 BILATERAL LOW BACK PAIN, UNSPECIFIED CHRONICITY, UNSPECIFIED WHETHER SCIATICA PRESENT: ICD-10-CM

## 2021-06-15 DIAGNOSIS — M25.561 PAIN IN BOTH KNEES, UNSPECIFIED CHRONICITY: ICD-10-CM

## 2021-06-15 DIAGNOSIS — M25.562 PAIN IN BOTH KNEES, UNSPECIFIED CHRONICITY: ICD-10-CM

## 2021-06-15 DIAGNOSIS — G25.0 BENIGN ESSENTIAL TREMOR: ICD-10-CM

## 2021-06-15 PROCEDURE — 99215 OFFICE O/P EST HI 40 MIN: CPT | Performed by: PHYSICAL MEDICINE & REHABILITATION

## 2021-06-15 ASSESSMENT — PATIENT HEALTH QUESTIONNAIRE - PHQ9
5. POOR APPETITE OR OVEREATING: 1 - SEVERAL DAYS
SUM OF ALL RESPONSES TO PHQ QUESTIONS 1-9: 5
CLINICAL INTERPRETATION OF PHQ2 SCORE: 2

## 2021-06-15 ASSESSMENT — PAIN SCALES - GENERAL: PAINLEVEL: 5=MODERATE PAIN

## 2021-06-15 NOTE — PROGRESS NOTES
"Follow up patient note  Pain Medicine, Interventional spine and sports physiatry, Physical medicine rehabilitation      Chief complaint:   Chief Complaint   Patient presents with   • Follow-Up     Neck pain          HISTORY    Please see new patient note by Dr Stern,  for more details.     HPI  Patient identification: Magdi Pittman 80 y.o. female presents for follow-up.     Interval history:   Magdi was last seen 11/06/2019.  She has recently moved to a care home community.  Overall, she has intermittent pain.  Her back and knee pain are joints that are among the most painful for her.    From what she reports, she is not sure that she \"understands\" fibromyalgia.  She wonders about this diagnosis and what it means.  She continues to be bothered by her essential tremor, which impairs her writing to the point that she requires assistance for writing checks.  She did not go to Neurology and is concerned about taking propranolol.    Her feet have been painful, she has seen by Podiatry for that.  She had some injections at the Podiatrist and this has significantly helped.      She has some calf pain and leg pain.  Sometimes, her knees feel painful and she has not fallen.  She did buy a walker yesterday.  She has about 100 steps to the elevator.      I am not sure when she stopped taking milnacipran.  She does not recall taking it at this time.  I had written it for about 6 months, it looks like.      She has been active in her physical fitness for decades, does have access to a gym at her new facility.    Her pain is a 7/10 on the NRS.    Medical records review:  I reviewed the note from the referring provider Nicole Holbrook M.D. dated 05/13/2019.  They have tried wellbutrin, cymbalta, celexa, lexapro, sertraline, amitriptyline without improvement.  She does see a counselor once a month.  They agreed on referral here.     ROS Red Flags :   Fever, Chills, Sweats: Denies  Involuntary Weight Loss: " Denies  Bowel/Bladder Incontinence: Denies  Saddle Anesthesia: Denies    PMHx:   Past Medical History:   Diagnosis Date   • Allergic rhinitis    • Arthritis    • ASTHMA    • Bronchitis    • COPD (chronic obstructive pulmonary disease) ([facility])    • Daytime sleepiness    • Depression    • H/O varicose vein ligation 3/26/2014   • History of breast cancer 3/26/2014   • Hyperthyroidism    • Intention tremor 9/28/2016   • Lumbar disc disease 3/26/2014   • Migraines 3/26/2014   • Obesity    • Pneumonia    • Pulmonary emphysema (HCC)    • Pulmonary nodules 6/23/2017   • Rheumatoid arthritis (HCC)    • Rheumatoid factor positive 3/26/2014    -CCP, Negative hepatitis panel     • Ringing in ears    • Shortness of breath    • Unspecified disorder of thyroid    • Wears glasses        PSHx:   Past Surgical History:   Procedure Laterality Date   • KNEE ARTHROSCOPY Right 2/26/2010    Procedure: WITH DEBRIDEMENT MEDIAL FEMORAL CONDYLE & PATELLA;  Surgeon: Gregorio Sloan M.D.;  Location: SURGERY [facility];  Service:    • MENISCECTOMY Right 2/26/2010    Procedure: PARTIAL LATERAL  ;  Surgeon: Gregorio Sloan M.D.;  Location: SURGERY [facility];  Service:    • BASAL CELL EXCISION  2009    left leg   • BREAST BIOPSY  1974    left breast   • TUBAL LIGATION  1973   • VEIN LIGATION  1966    bilateral legs   • TONSILLECTOMY  1958   • PB REMOVAL SYNOVIAL CYST,KNEE  1948    right knee       Family history   Denies neuromuscular disease  Family History   Problem Relation Age of Onset   • Cancer Mother         breast   • Lung Disease Father    • Cancer Sister         breast, pancreatic   • Cancer Brother    • Cancer Maternal Aunt    • Cancer Maternal Grandmother        Medications:   Current Outpatient Medications   Medication   • propranolol LA (INDERAL LA) 60 MG CAPSULE SR 24 HR   • Misc. Devices Misc   • thyroid (ARMOUR THYROID) 60 MG Tab   • triamterene-hctz (MAXZIDE-25/DYAZIDE) 37.5-25 MG Tab   • albuterol (PROVENTIL)  2.5mg/3ml Nebu Soln solution for nebulization   • albuterol (PROAIR HFA) 108 (90 Base) MCG/ACT Aero Soln inhalation aerosol   • Tiotropium Bromide Monohydrate (SPIRIVA RESPIMAT) 2.5 MCG/ACT Aero Soln   • Cholecalciferol (VITAMIND D3) 1000 UNIT Tab   • Misc. Devices Misc   • Acetaminophen (TYLENOL ARTHRITIS PAIN PO)   • Cyanocobalamin (VITAMIN B-12 PO)   • Multiple Vitamin (MULTIVITAMIN PO)     No current facility-administered medications for this visit.       Allergies:   Allergies   Allergen Reactions   • Asa [Aspirin]      Does not take D/T asthma hx   • Quinine    • Sulfa Drugs Rash     Skin peeling       Social Hx:   Social History     Socioeconomic History   • Marital status:      Spouse name: Not on file   • Number of children: Not on file   • Years of education: Not on file   • Highest education level: Not on file   Occupational History   • Not on file   Tobacco Use   • Smoking status: Never Smoker   • Smokeless tobacco: Never Used   Vaping Use   • Vaping Use: Never used   Substance and Sexual Activity   • Alcohol use: Yes     Alcohol/week: 1.8 - 2.4 oz     Types: 3 - 4 Glasses of wine per week     Comment: 3-4   • Drug use: No   • Sexual activity: Not Currently   Other Topics Concern   •  Service No   • Blood Transfusions No   • Caffeine Concern No   • Occupational Exposure No   • Hobby Hazards No   • Sleep Concern No   • Stress Concern Yes   • Weight Concern No   • Special Diet No   • Back Care No   • Exercise No   • Bike Helmet No   • Seat Belt Yes   • Self-Exams No   Social History Narrative   • Not on file     Social Determinants of Health     Financial Resource Strain:    • Difficulty of Paying Living Expenses:    Food Insecurity:    • Worried About Running Out of Food in the Last Year:    • Ran Out of Food in the Last Year:    Transportation Needs:    • Lack of Transportation (Medical):    • Lack of Transportation (Non-Medical):    Physical Activity:    • Days of Exercise per Week:    •  "Minutes of Exercise per Session:    Stress:    • Feeling of Stress :    Social Connections:    • Frequency of Communication with Friends and Family:    • Frequency of Social Gatherings with Friends and Family:    • Attends Denominational Services:    • Active Member of Clubs or Organizations:    • Attends Club or Organization Meetings:    • Marital Status:    Intimate Partner Violence:    • Fear of Current or Ex-Partner:    • Emotionally Abused:    • Physically Abused:    • Sexually Abused:          EXAMINATION     Physical Exam:   Vitals: /62 (BP Location: Right arm, Patient Position: Sitting, BP Cuff Size: Adult)   Pulse 94   Temp 36.9 °C (98.4 °F) (Temporal)   Ht 1.626 m (5' 4\")   Wt 71.7 kg (158 lb 1.1 oz)   SpO2 96%     Constitutional:   Body Habitus: Body mass index is 27.13 kg/m².  Cooperation: Fully cooperates with exam  Appearance: Well-groomed no disheveled, in no acute distress.  Wearing a face mask    Respiratory-  breathing comfortable on room air, no audible wheezing  Cardiovascular- capillary refills less than 2 seconds. No lower extremity edema is noted.   Psychiatric- alert and oriented ×3. Normal affect.   Musculoskeleta:   Multiple tender points in cervical and lumbar spine, greater trochanteric bursa tenderness, gluteal tender points  Tenderness medial joint line bilaterally.  Mild effusion on the right and negative on the left.  No focal motor or sensory deficits in the lower extremities bilaterally  Gait is steady without assist device      MEDICAL DECISION MAKING    DATA    Labs:   Lab Results   Component Value Date/Time    SODIUM 135 03/08/2021 01:16 PM    POTASSIUM 3.9 03/08/2021 01:16 PM    CHLORIDE 98 03/08/2021 01:16 PM    CO2 24 03/08/2021 01:16 PM    GLUCOSE 98 03/08/2021 01:16 PM    BUN 25 (H) 03/08/2021 01:16 PM    CREATININE 0.69 03/08/2021 01:16 PM        No results found for: PROTHROMBTM, INR     Lab Results   Component Value Date/Time    WBC 6.1 05/28/2020 03:10 PM    RBC " 4.27 05/28/2020 03:10 PM    HEMOGLOBIN 14.1 05/28/2020 03:10 PM    HEMATOCRIT 41.4 05/28/2020 03:10 PM    MCV 97.0 05/28/2020 03:10 PM    MCH 33.0 05/28/2020 03:10 PM    MCHC 34.1 05/28/2020 03:10 PM    MPV 9.1 05/28/2020 03:10 PM    NEUTSPOLYS 60.50 01/27/2017 12:05 PM    LYMPHOCYTES 18.70 (L) 01/27/2017 12:05 PM    MONOCYTES 11.60 01/27/2017 12:05 PM    EOSINOPHILS 7.90 (H) 01/27/2017 12:05 PM    BASOPHILS 1.10 01/27/2017 12:05 PM        Lab Results   Component Value Date/Time    HBA1C 5.4 03/08/2021 01:16 PM          Imaging: I personally reviewed following images    None    I reviewed the following radiology reports    Xray hips 03/08/2018  IMPRESSION:     No radiographic evidence of acute traumatic bone injury.     Symmetric degenerative changes about both hip joints.     Lumbosacral facet joint degenerative changes.                                                                  DIAGNOSIS   Visit Diagnoses     ICD-10-CM   1. Pain in both knees, unspecified chronicity  M25.561    M25.562   2. Fibromyalgia  M79.7   3. Benign essential tremor  G25.0   4. Bilateral low back pain, unspecified chronicity, unspecified whether sciatica present  M54.5         ASSESSMENT and PLAN:     Magdi Pittman 80 y.o. female seen for above    Magdi was seen today for follow-up.    Diagnoses and all orders for this visit:    Pain in both knees, unspecified chronicity  -     DX-KNEE 3 VIEWS RIGHT; Future  -     DX-KNEE 3 VIEWS LEFT; Future    Fibromyalgia    Benign essential tremor    Bilateral low back pain, unspecified chronicity, unspecified whether sciatica present  -     DX-LUMBAR SPINE-2 OR 3 VIEWS; Future        1. Magdi does not particularly recall taking milnacipran, if she filled it or not.  We didn't make plans to restart it for now.    2. Discussed Fibromyalgia and basic principles of diet, exercise, lifestyle management that can be helpful in addition to medications  She does not want to go to physical therapy, but  reports that she will start to work on exercise. Discussed starting 3-5 minutes and gradually increase.    3. Discussed follow-up with Dr. Holbrook as the tremor is difficult for her and she reports that she is not sure about taking propranolol due to possible side effects, so I don't know if she is taking it.    4. Xrays of lumbar spine and knees.    Follow up: 8 weeks    Thank you for allowing me to participate in the care of this patient. If you have any questions please not hesitate to contact me.    My total time spent caring for the patient on the day of the encounter was 40-54 minutes.   This does not include time spent on separately billable procedures/tests.      Please note that this dictation was created using voice recognition software. I have made every reasonable attempt to correct obvious errors but there may be errors of grammar and content that I may have overlooked prior to finalization of this note.      Issa Stern MD  Interventional Spine and Sports Physiatry  Physical Medicine and Rehabilitation  [facility]

## 2021-06-20 RX ORDER — TIOTROPIUM BROMIDE INHALATION SPRAY 3.12 UG/1
5 SPRAY, METERED RESPIRATORY (INHALATION) DAILY
Qty: 4 G | Refills: 6 | Status: SHIPPED | OUTPATIENT
Start: 2021-06-20 | End: 2021-07-08 | Stop reason: SDUPTHER

## 2021-06-22 ENCOUNTER — APPOINTMENT (OUTPATIENT)
Dept: RADIOLOGY | Facility: MEDICAL CENTER | Age: 81
End: 2021-06-22
Attending: PHYSICAL MEDICINE & REHABILITATION
Payer: MEDICARE

## 2021-06-24 ENCOUNTER — HOSPITAL ENCOUNTER (OUTPATIENT)
Dept: RADIOLOGY | Facility: MEDICAL CENTER | Age: 81
End: 2021-06-24
Attending: PHYSICAL MEDICINE & REHABILITATION
Payer: MEDICARE

## 2021-06-24 DIAGNOSIS — M25.562 PAIN IN BOTH KNEES, UNSPECIFIED CHRONICITY: ICD-10-CM

## 2021-06-24 DIAGNOSIS — M25.561 PAIN IN BOTH KNEES, UNSPECIFIED CHRONICITY: ICD-10-CM

## 2021-06-24 DIAGNOSIS — M54.50 BILATERAL LOW BACK PAIN, UNSPECIFIED CHRONICITY, UNSPECIFIED WHETHER SCIATICA PRESENT: ICD-10-CM

## 2021-06-24 PROCEDURE — 72100 X-RAY EXAM L-S SPINE 2/3 VWS: CPT

## 2021-06-24 PROCEDURE — 73562 X-RAY EXAM OF KNEE 3: CPT | Mod: RT

## 2021-06-24 PROCEDURE — 73562 X-RAY EXAM OF KNEE 3: CPT | Mod: LT

## 2021-07-07 ENCOUNTER — NON-PROVIDER VISIT (OUTPATIENT)
Dept: SLEEP MEDICINE | Facility: MEDICAL CENTER | Age: 81
End: 2021-07-07
Attending: INTERNAL MEDICINE
Payer: MEDICARE

## 2021-07-07 VITALS — WEIGHT: 157 LBS | HEIGHT: 64 IN | BODY MASS INDEX: 26.8 KG/M2

## 2021-07-07 DIAGNOSIS — J44.9 CHRONIC OBSTRUCTIVE PULMONARY DISEASE, UNSPECIFIED COPD TYPE (HCC): ICD-10-CM

## 2021-07-07 PROCEDURE — 94010 BREATHING CAPACITY TEST: CPT | Performed by: INTERNAL MEDICINE

## 2021-07-07 PROCEDURE — 94729 DIFFUSING CAPACITY: CPT | Performed by: INTERNAL MEDICINE

## 2021-07-07 PROCEDURE — 94726 PLETHYSMOGRAPHY LUNG VOLUMES: CPT | Performed by: INTERNAL MEDICINE

## 2021-07-07 ASSESSMENT — PULMONARY FUNCTION TESTS
FEV1/FVC_PERCENT_PREDICTED: 82
FEV1: 1.33
FVC_LLN: 2.08
FEV1/FVC: 62
FEV1/FVC_PERCENT_LLN: 64
FEV1/FVC_PERCENT_PREDICTED: 76
FEV1/FVC_PERCENT_PREDICTED: 81
FVC: 2.13
FVC_PERCENT_PREDICTED: 85
FEV1/FVC: 62
FVC_PREDICTED: 2.49
FEV1_PERCENT_PREDICTED: 70
FEV1/FVC_PREDICTED: 77
FEV1_PREDICTED: 1.88
FEV1_LLN: 1.57

## 2021-07-07 NOTE — PROCEDURES
Tech: Latricia Hearn, RRT, CPFT  Tech notes: Good patient effort & cooperation.  The results of this test meet the ATS/ERS standards for acceptability & reproducibility.  Test was performed on the [employer] Body Plethysmograph-Elite DX system.  Predicted values were GLI-2012 for spirometry, GLI- 2017 for DLCO, ITS for Lung Volumes.  The DLCO was uncorrected for Hgb.    Interpretation:  Baseline spirometry shows airflow obstruction with FEV1/FVC ratio of 62 and an FEV1 of 1.33 L or 70% predicted.  Bronchodilator testing was not performed.  Total lung capacity is within normal limits at 5.13 L or 101% predicted.  There is borderline air trapping with residual volume right at 120% predicted.  Diffusion capacity is within normal limits at 106% predicted.  Pulmonary function testing shows airflow obstruction with borderline air trapping which may be consistent with reactive airways disease versus early COPD.  Correlate clinically and with imaging.

## 2021-07-08 ENCOUNTER — OFFICE VISIT (OUTPATIENT)
Dept: SLEEP MEDICINE | Facility: MEDICAL CENTER | Age: 81
End: 2021-07-08
Payer: MEDICARE

## 2021-07-08 VITALS
BODY MASS INDEX: 27.69 KG/M2 | WEIGHT: 162.19 LBS | SYSTOLIC BLOOD PRESSURE: 114 MMHG | OXYGEN SATURATION: 95 % | HEART RATE: 75 BPM | DIASTOLIC BLOOD PRESSURE: 66 MMHG | HEIGHT: 64 IN

## 2021-07-08 DIAGNOSIS — J96.11 CHRONIC RESPIRATORY FAILURE WITH HYPOXIA (HCC): ICD-10-CM

## 2021-07-08 DIAGNOSIS — R91.8 PULMONARY NODULES: ICD-10-CM

## 2021-07-08 DIAGNOSIS — J44.9 CHRONIC OBSTRUCTIVE PULMONARY DISEASE, UNSPECIFIED COPD TYPE (HCC): ICD-10-CM

## 2021-07-08 PROCEDURE — 99214 OFFICE O/P EST MOD 30 MIN: CPT | Performed by: INTERNAL MEDICINE

## 2021-07-08 RX ORDER — TIOTROPIUM BROMIDE INHALATION SPRAY 3.12 UG/1
5 SPRAY, METERED RESPIRATORY (INHALATION) DAILY
Qty: 4 G | Refills: 6 | Status: SHIPPED | OUTPATIENT
Start: 2021-07-08 | End: 2022-08-04 | Stop reason: SDUPTHER

## 2021-07-08 NOTE — PROGRESS NOTES
"Chief Complaint   Patient presents with   • COPD     Last seen 01/19/21   • Results     PFT 07/07/21     HPI: This patient is a jose eduardo 80 y.o. Female who returns for COPD/Asthma.  She is a lifelong non-smoker however had significant secondhand smoke exposures working in a [employer] for 30 years. She was diagnosed with asthma at the age of 17.  Respiratory triggers include environmental allergies, URIs and \"stress\".  She had been on ICS/LABA inhaler (Advair) however this was switched to Spiriva.  She denies worsening dyspnea, cough, wheezing or chest tightness.  She did not notice any perceptible difference between using ICS/LABA versus Spiriva. Denies PRITESH use.  Denies AECOPD over the past year. On nighttime 02.   Pulmonary function testing show moderate COPD with FEV1 improved from 1.28 L or 66% to 1.33 L or 70%. Diffusion capacity is normal at 106%.  Received Covid vaccine.  Chest CAT scan June 6, 2019 showed subpleural nodules, all subcentimeter, stable from 2017 consistent with benign etiology.  Has moved into assisted living and started going to the gym 2x/week.    Past Medical History:   Diagnosis Date   • Allergic rhinitis    • Arthritis    • ASTHMA    • Bronchitis    • COPD (chronic obstructive pulmonary disease) (HCC)    • Daytime sleepiness    • Depression    • H/O varicose vein ligation 3/26/2014   • History of breast cancer 3/26/2014   • Hyperthyroidism    • Intention tremor 9/28/2016   • Lumbar disc disease 3/26/2014   • Migraines 3/26/2014   • Obesity    • Pneumonia    • Pulmonary emphysema (HCC)    • Pulmonary nodules 6/23/2017   • Rheumatoid arthritis (HCC)    • Rheumatoid factor positive 3/26/2014    -CCP, Negative hepatitis panel     • Ringing in ears    • Shortness of breath    • Unspecified disorder of thyroid    • Wears glasses        Social History     Socioeconomic History   • Marital status:      Spouse name: Not on file   • Number of children: Not on file   • Years of education: Not on " file   • Highest education level: Not on file   Occupational History   • Not on file   Tobacco Use   • Smoking status: Never Smoker   • Smokeless tobacco: Never Used   Vaping Use   • Vaping Use: Never used   Substance and Sexual Activity   • Alcohol use: Yes     Alcohol/week: 1.8 - 2.4 oz     Types: 3 - 4 Glasses of wine per week     Comment: 3-4   • Drug use: No   • Sexual activity: Not Currently   Other Topics Concern   •  Service No   • Blood Transfusions No   • Caffeine Concern No   • Occupational Exposure No   • Hobby Hazards No   • Sleep Concern No   • Stress Concern Yes   • Weight Concern No   • Special Diet No   • Back Care No   • Exercise No   • Bike Helmet No   • Seat Belt Yes   • Self-Exams No   Social History Narrative   • Not on file     Social Determinants of Health     Financial Resource Strain:    • Difficulty of Paying Living Expenses:    Food Insecurity:    • Worried About Running Out of Food in the Last Year:    • Ran Out of Food in the Last Year:    Transportation Needs:    • Lack of Transportation (Medical):    • Lack of Transportation (Non-Medical):    Physical Activity:    • Days of Exercise per Week:    • Minutes of Exercise per Session:    Stress:    • Feeling of Stress :    Social Connections:    • Frequency of Communication with Friends and Family:    • Frequency of Social Gatherings with Friends and Family:    • Attends Orthodoxy Services:    • Active Member of Clubs or Organizations:    • Attends Club or Organization Meetings:    • Marital Status:    Intimate Partner Violence:    • Fear of Current or Ex-Partner:    • Emotionally Abused:    • Physically Abused:    • Sexually Abused:        Family History   Problem Relation Age of Onset   • Cancer Mother         breast   • Lung Disease Father    • Cancer Sister         breast, pancreatic   • Cancer Brother    • Cancer Maternal Aunt    • Cancer Maternal Grandmother        Current Outpatient Medications on File Prior to Visit    Medication Sig Dispense Refill   • Non Formulary Request Green tea supplemental     • tiotropium (SPIRIVA RESPIMAT) 2.5 mcg/Act Aero Soln Inhale 2 Inhalation every day. 4 g 6   • Misc. Devices Misc One 4-wheeled walker with hand brakes and a seat.  Patient height 5 foot 4 inches 1 Each 0   • thyroid (ARMOUR THYROID) 60 MG Tab Take 1 Tab by mouth every day. 90 Tab 3   • triamterene-hctz (MAXZIDE-25/DYAZIDE) 37.5-25 MG Tab Take 1 Tab by mouth every day. 90 Tab 3   • albuterol (PROVENTIL) 2.5mg/3ml Nebu Soln solution for nebulization 3 mL by Nebulization route every four hours as needed for Shortness of Breath. 30 Bullet 3   • albuterol (PROAIR HFA) 108 (90 Base) MCG/ACT Aero Soln inhalation aerosol Inhale 1-2 Puffs by mouth every 6 hours as needed for Shortness of Breath. 1 Inhaler 3   • Cholecalciferol (VITAMIND D3) 1000 UNIT Tab Take 1,000 Units by mouth every day.     • Misc. Devices Misc 2L oxygen via nasal cannula at night while sleeping 1 Units 0   • Acetaminophen (TYLENOL ARTHRITIS PAIN PO) Take 650 mg by mouth 2 Times a Day.     • Cyanocobalamin (VITAMIN B-12 PO) Take  by mouth every day.     • Multiple Vitamin (MULTIVITAMIN PO) Take 1 Tab by mouth every day.     • propranolol LA (INDERAL LA) 60 MG CAPSULE SR 24 HR Take 1 capsule by mouth every day. (Patient not taking: Reported on 7/8/2021) 90 capsule 1     No current facility-administered medications on file prior to visit.       Allergies: Asa [aspirin], Quinine, and Sulfa drugs    ROS:   Constitutional: Denies fevers, chills, night sweats, fatigue or weight loss  Eyes: Denies vision loss, pain, drainage, double vision  Ears, Nose, Throat: Denies earache, difficulty hearing, tinnitus, nasal congestion, hoarseness  Cardiovascular: Denies chest pain, tightness, palpitations, orthopnea or edema  Respiratory: As in HPI  Sleep: Denies daytime sleepiness, snoring, apneas, insomnia, morning headaches  GI: Denies heartburn, dysphagia, nausea, abdominal pain,  "diarrhea or constipation  : Denies frequent urination, hematuria, discharge or painful urination  Musculoskeletal: Denies back pain, painful joints, sore muscles  Neurological: Denies weakness or headaches  Skin: No rashes    /66 (BP Location: Right arm, Patient Position: Sitting, BP Cuff Size: Adult)   Pulse 75   Ht 1.626 m (5' 4\")   Wt 73.6 kg (162 lb 3 oz)   SpO2 95%     Physical Exam:  Appearance: Well-nourished, well-developed, in no acute distress  HEENT: Normocephalic, atraumatic, white sclera, PERRLA, oropharynx clear  Neck: No adenopathy or masses  Respiratory: no intercostal retractions or accessory muscle use  Lungs auscultation: Clear to auscultation bilaterally  Cardiovascular: Regular rate rhythm. No murmurs, rubs or gallops.  No LE edema  Abdomen: soft, nondistended  Gait: Normal  Digits: No clubbing, cyanosis  Motor: No focal deficits  Orientation: Oriented to time, person and place    Diagnosis:  1. Chronic obstructive pulmonary disease, unspecified COPD type ([facility])     2. Chronic respiratory failure with hypoxia ([facility])     3. Pulmonary nodules         Plan:  Patient's COPD is clinically stable. Spirometry shows improved FeV1 1.33 L or 70%. Continue Spiriva inhaler.  She is up to date on respiratory vaccines, including COVID vaccine.  Pulmonary  nodules are consistent with benign lesions.  Lifelong nonsmoker. No further surveillance required.  Return in about 6 months (around 1/8/2022).      "

## 2021-08-10 ENCOUNTER — APPOINTMENT (OUTPATIENT)
Dept: PHYSICAL MEDICINE AND REHAB | Facility: MEDICAL CENTER | Age: 81
End: 2021-08-10
Payer: MEDICARE

## 2021-08-10 ENCOUNTER — TELEPHONE (OUTPATIENT)
Dept: MEDICAL GROUP | Facility: MEDICAL CENTER | Age: 81
End: 2021-08-10

## 2021-08-10 DIAGNOSIS — R05.9 COUGH: ICD-10-CM

## 2021-08-10 DIAGNOSIS — R50.9 FEVER, UNSPECIFIED FEVER CAUSE: ICD-10-CM

## 2021-08-10 DIAGNOSIS — R52 BODY ACHES: ICD-10-CM

## 2021-08-10 NOTE — TELEPHONE ENCOUNTER
Phone Number Called: [phone]    Call outcome: Spoke to patient regarding message below.    Message: Called to inform patient that per Dr. Holbrook, she has ordered a covid test for the patient, but Dr. Holbrook does recommend the patient been seen in the ER if she is unable to care for herself or keep herself hydrated. Patient verbalized understanding.

## 2021-08-10 NOTE — TELEPHONE ENCOUNTER
"Phone Number Called: [phone]    Call outcome: Spoke to patient regarding message below.    Message: Called to follow up with patient regarding her voicemail left for us that she is not feeling well. Per patient, she is \"not doing well.\" She has recently lost 6-7 pounds due to not eating a lot. Patient stated that at times she is hungry and at times she has no appetite, but she is \"not really nauseous.\" Patient explained that she has had a metallic taste in her mouth and hasn't been able to taste much of anything. Patient stated her cough is worsening, she hurts all over (eyes, ears, and head), and she is weak. Patient stated she did not go to Urgent Care today as she did not feel good enough to go. Patient also experiencing a low grade fever of 99 - 100. Patient stated she did recently travel, but she is vaccinated. Patient advised that this RN will tell her provider. Appointment scheduled for 08/19/2021 to be evaluated. Patient advised that if she has any difficulty breathing, her symptoms worsen, or she has any change in mental status to go to the ER immediately.      "

## 2021-08-17 ENCOUNTER — HOSPITAL ENCOUNTER (OUTPATIENT)
Dept: LAB | Facility: MEDICAL CENTER | Age: 81
End: 2021-08-17
Attending: FAMILY MEDICINE
Payer: MEDICARE

## 2021-08-17 DIAGNOSIS — R50.9 FEVER, UNSPECIFIED FEVER CAUSE: ICD-10-CM

## 2021-08-17 DIAGNOSIS — R52 BODY ACHES: ICD-10-CM

## 2021-08-17 DIAGNOSIS — R05.9 COUGH: ICD-10-CM

## 2021-08-17 LAB — COVID ORDER STATUS COVID19: NORMAL

## 2021-08-17 PROCEDURE — C9803 HOPD COVID-19 SPEC COLLECT: HCPCS

## 2021-08-17 PROCEDURE — U0005 INFEC AGEN DETEC AMPLI PROBE: HCPCS

## 2021-08-17 PROCEDURE — U0003 INFECTIOUS AGENT DETECTION BY NUCLEIC ACID (DNA OR RNA); SEVERE ACUTE RESPIRATORY SYNDROME CORONAVIRUS 2 (SARS-COV-2) (CORONAVIRUS DISEASE [COVID-19]), AMPLIFIED PROBE TECHNIQUE, MAKING USE OF HIGH THROUGHPUT TECHNOLOGIES AS DESCRIBED BY CMS-2020-01-R: HCPCS

## 2021-08-18 ENCOUNTER — TELEPHONE (OUTPATIENT)
Dept: MEDICAL GROUP | Facility: MEDICAL CENTER | Age: 81
End: 2021-08-18

## 2021-08-18 LAB
SARS-COV-2 RNA RESP QL NAA+PROBE: NOTDETECTED
SPECIMEN SOURCE: NORMAL

## 2021-08-18 NOTE — TELEPHONE ENCOUNTER
"Phone Number Called: [phone]    Call outcome: Spoke to patient regarding message below.    Message: Called to inform patient of message below. Patient stated that she is feeling better but still \"not good.\" She is eating a little more and can feel that her strength is coming back, but she still does not last very long standing up. Patient stated that she still is unable to taste much and she gets nauseous at times. Patient stated her cough is about the same. Per patient, she was able to get herself dressed and to her test yesterday. At this point she is wondering if it's the smoke or walking pneumonia. This RN asked patient if she has any high caloric drinks such as boost or ensure. Patient stated that she does have protein drinks. Patient advised to drink one of those. Patient stated that she always has fluids within arms reach to hydrate herself and she is being more conscious about drinking water. Patient has appointment scheduled for tomorrow, 08/19/2021, with provider. Patient advised that if she cannot make it to the appointment tomorrow due to not feeling well, she needs to be seen in the Emergency Room. Patient also advised once again, that if she cannot take care of herself she needs to be seen in the Emergency Room.           ----- Message from Nicole Holbrook M.D. sent at 8/18/2021  2:04 PM PDT -----  Please let pt know that covid test is negative    "

## 2021-08-19 ENCOUNTER — HOSPITAL ENCOUNTER (OUTPATIENT)
Dept: RADIOLOGY | Facility: MEDICAL CENTER | Age: 81
End: 2021-08-19
Attending: FAMILY MEDICINE
Payer: MEDICARE

## 2021-08-19 ENCOUNTER — OFFICE VISIT (OUTPATIENT)
Dept: MEDICAL GROUP | Facility: MEDICAL CENTER | Age: 81
End: 2021-08-19
Payer: MEDICARE

## 2021-08-19 VITALS
WEIGHT: 148 LBS | BODY MASS INDEX: 25.27 KG/M2 | DIASTOLIC BLOOD PRESSURE: 70 MMHG | HEART RATE: 74 BPM | SYSTOLIC BLOOD PRESSURE: 120 MMHG | HEIGHT: 64 IN | RESPIRATION RATE: 16 BRPM | TEMPERATURE: 97.8 F | OXYGEN SATURATION: 93 %

## 2021-08-19 DIAGNOSIS — J96.11 CHRONIC RESPIRATORY FAILURE WITH HYPOXIA (HCC): ICD-10-CM

## 2021-08-19 DIAGNOSIS — J44.9 CHRONIC OBSTRUCTIVE PULMONARY DISEASE, UNSPECIFIED COPD TYPE (HCC): ICD-10-CM

## 2021-08-19 DIAGNOSIS — J18.9 PNEUMONIA OF LEFT LOWER LOBE DUE TO INFECTIOUS ORGANISM: ICD-10-CM

## 2021-08-19 PROCEDURE — 99214 OFFICE O/P EST MOD 30 MIN: CPT | Performed by: FAMILY MEDICINE

## 2021-08-19 PROCEDURE — 71046 X-RAY EXAM CHEST 2 VIEWS: CPT

## 2021-08-19 RX ORDER — PREDNISONE 10 MG/1
TABLET ORAL
Qty: 32 TABLET | Refills: 0 | Status: SHIPPED | OUTPATIENT
Start: 2021-08-19 | End: 2021-09-03

## 2021-08-19 RX ORDER — AMOXICILLIN AND CLAVULANATE POTASSIUM 875; 125 MG/1; MG/1
1 TABLET, FILM COATED ORAL 2 TIMES DAILY
Qty: 10 TABLET | Refills: 0 | Status: SHIPPED | OUTPATIENT
Start: 2021-08-19 | End: 2021-08-24

## 2021-08-19 RX ORDER — AZITHROMYCIN 250 MG/1
TABLET, FILM COATED ORAL
Qty: 6 TABLET | Refills: 0 | Status: SHIPPED | OUTPATIENT
Start: 2021-08-19 | End: 2021-09-03

## 2021-08-19 NOTE — PROGRESS NOTES
Subjective:     Magdi Pittman is a 80 y.o. female presenting with fatigue.  Has been having weakness, fatigue, body aches, headaches, coughing since the beginning of August.  She has also been having some shortness of breath, chest heaviness, poor appetite, fever to 102 last week.  Denies any vomiting, diarrhea, constipation, abdominal pain, sick contacts.  She did travel from [place] in July.  She has had her Covid vaccine.  She was tested for Covid on 17th, was negative.  She is staying hydrated somewhat.  She has been holding her diuretic.  She continues on her inhalers, has been using her albuterol inhaler more frequently        Current Outpatient Medications:   •  amoxicillin-clavulanate (AUGMENTIN) 875-125 MG Tab, Take 1 Tablet by mouth 2 times a day for 5 days., Disp: 10 Tablet, Rfl: 0  •  azithromycin (ZITHROMAX) 250 MG Tab, Take 2 tablets (500 mg) PO on day 1 and then take 1 tablet (250 mg) daily on 2-5, Disp: 6 Tablet, Rfl: 0  •  predniSONE (DELTASONE) 10 MG Tab, Take orally as follows: 40mg X 3 Days, 30mg X 3 Days, 20mg X 3 Days, 10mg X 3 Days, 5mg X 3 Days, then discontinue., Disp: 32 Tablet, Rfl: 0  •  tiotropium (SPIRIVA RESPIMAT) 2.5 mcg/Act Aero Soln, Inhale 2 Inhalation every day., Disp: 4 g, Rfl: 6  •  thyroid (ARMOUR THYROID) 60 MG Tab, Take 1 Tab by mouth every day., Disp: 90 Tab, Rfl: 3  •  triamterene-hctz (MAXZIDE-25/DYAZIDE) 37.5-25 MG Tab, Take 1 Tab by mouth every day., Disp: 90 Tab, Rfl: 3  •  albuterol (PROVENTIL) 2.5mg/3ml Nebu Soln solution for nebulization, 3 mL by Nebulization route every four hours as needed for Shortness of Breath., Disp: 30 Bullet, Rfl: 3  •  albuterol (PROAIR HFA) 108 (90 Base) MCG/ACT Aero Soln inhalation aerosol, Inhale 1-2 Puffs by mouth every 6 hours as needed for Shortness of Breath., Disp: 1 Inhaler, Rfl: 3  •  Cholecalciferol (VITAMIND D3) 1000 UNIT Tab, Take 1,000 Units by mouth every day., Disp: , Rfl:   •  Misc. Devices Misc, 2L oxygen via nasal  "cannula at night while sleeping, Disp: 1 Units, Rfl: 0  •  Acetaminophen (TYLENOL ARTHRITIS PAIN PO), Take 650 mg by mouth 2 Times a Day., Disp: , Rfl:   •  Cyanocobalamin (VITAMIN B-12 PO), Take  by mouth every day., Disp: , Rfl:   •  Multiple Vitamin (MULTIVITAMIN PO), Take 1 Tab by mouth every day., Disp: , Rfl:     Objective:     Vitals: /70   Pulse 74   Temp 36.6 °C (97.8 °F)   Resp 16   Ht 1.626 m (5' 4\")   Wt 67.1 kg (148 lb)   SpO2 93%   BMI 25.40 kg/m²   General: Alert, appears fatigued  HEENT: Normocephalic  Heart: Regular rate and rhythm.  S1 and S2 normal.  No murmurs appreciated.  Respiratory: Normal respiratory effort.  Faint rales at the bases, left greater than right.  Abdomen: Non-distended, soft  Extremities: No leg edema.    Assessment/Plan:     Magdi was seen today for follow-up.    Diagnoses and all orders for this visit:    Pneumonia of left lower lobe due to infectious organism  Acute, complicated illness.  Suspect pneumonia, will check a chest x-ray and start Augmentin, azithromycin.  We will also start a course of prednisone as she does have COPD and has been using her albuterol inhaler more frequently.  Recommended continuing to hold her diuretic.  Discussed staying well-hydrated.  We will ask our nurse to check in on the patient in a couple days, follow-up in clinic in 2 weeks.  Discussed signs and symptoms to watch for, reasons to go to the ER  -     DX-CHEST-2 VIEWS; Future  -     amoxicillin-clavulanate (AUGMENTIN) 875-125 MG Tab; Take 1 Tablet by mouth 2 times a day for 5 days.  -     azithromycin (ZITHROMAX) 250 MG Tab; Take 2 tablets (500 mg) PO on day 1 and then take 1 tablet (250 mg) daily on 2-5  -     predniSONE (DELTASONE) 10 MG Tab; Take orally as follows: 40mg X 3 Days, 30mg X 3 Days, 20mg X 3 Days, 10mg X 3 Days, 5mg X 3 Days, then discontinue.    Chronic obstructive pulmonary disease, unspecified COPD type (HCC)  Chronic respiratory failure with hypoxia " (HCC)  Chronic, possible exacerbation.  See above        Return in about 2 weeks (around 9/2/2021) for routine follow up.

## 2021-08-23 ENCOUNTER — TELEPHONE (OUTPATIENT)
Dept: MEDICAL GROUP | Facility: MEDICAL CENTER | Age: 81
End: 2021-08-23

## 2021-08-23 NOTE — TELEPHONE ENCOUNTER
Phone Number Called: [phone]    Call outcome: Spoke to patient regarding message below.    Message: Called to inform patient that per Dr. Holbrook, her chest x-ray does confirm the pneumonia we suspected. This RN also calling to check on how patient is feeling per provider's request. Per patient she is feeling much better, but still not 100%. Her cough has eased up and she is eating more than before, but still not a lot. Patient stated that has not had any fevers either. Patient stated she is feeling stronger than she did before, but still not a lot as well. Patient stated that she has finished one antibiotic and will be finishing the other one tomorrow. Patient is still working on her prednisone. Patient is wondering if pneumonia is contagious. Patient advised that it is contagious like any other bacterial or viral infection, and can be spread through sneezes or coughing. However, since she has been on antibiotics and it does seem to be helping, usually you are only contagious up until 24-48 after starting antibiotics if it is bacterial.

## 2021-09-03 ENCOUNTER — OFFICE VISIT (OUTPATIENT)
Dept: MEDICAL GROUP | Facility: MEDICAL CENTER | Age: 81
End: 2021-09-03
Payer: MEDICARE

## 2021-09-03 VITALS
OXYGEN SATURATION: 92 % | DIASTOLIC BLOOD PRESSURE: 60 MMHG | HEIGHT: 64 IN | HEART RATE: 77 BPM | BODY MASS INDEX: 26.73 KG/M2 | WEIGHT: 156.6 LBS | TEMPERATURE: 98.4 F | SYSTOLIC BLOOD PRESSURE: 114 MMHG

## 2021-09-03 DIAGNOSIS — J44.9 CHRONIC OBSTRUCTIVE PULMONARY DISEASE, UNSPECIFIED COPD TYPE (HCC): ICD-10-CM

## 2021-09-03 DIAGNOSIS — J18.9 PNEUMONIA OF LEFT LOWER LOBE DUE TO INFECTIOUS ORGANISM: ICD-10-CM

## 2021-09-03 DIAGNOSIS — G25.0 BENIGN ESSENTIAL TREMOR: ICD-10-CM

## 2021-09-03 PROCEDURE — 99214 OFFICE O/P EST MOD 30 MIN: CPT | Performed by: FAMILY MEDICINE

## 2021-09-03 ASSESSMENT — PAIN SCALES - GENERAL: PAINLEVEL: NO PAIN

## 2021-09-12 ENCOUNTER — APPOINTMENT (OUTPATIENT)
Dept: RADIOLOGY | Facility: MEDICAL CENTER | Age: 81
End: 2021-09-12
Attending: EMERGENCY MEDICINE
Payer: MEDICARE

## 2021-09-12 ENCOUNTER — HOSPITAL ENCOUNTER (EMERGENCY)
Facility: MEDICAL CENTER | Age: 81
End: 2021-09-12
Attending: EMERGENCY MEDICINE
Payer: MEDICARE

## 2021-09-12 VITALS
HEIGHT: 64 IN | RESPIRATION RATE: 16 BRPM | HEART RATE: 85 BPM | BODY MASS INDEX: 25.67 KG/M2 | TEMPERATURE: 98.5 F | WEIGHT: 150.35 LBS | SYSTOLIC BLOOD PRESSURE: 122 MMHG | OXYGEN SATURATION: 94 % | DIASTOLIC BLOOD PRESSURE: 77 MMHG

## 2021-09-12 DIAGNOSIS — J44.1 ACUTE EXACERBATION OF CHRONIC OBSTRUCTIVE PULMONARY DISEASE (COPD) (HCC): ICD-10-CM

## 2021-09-12 LAB
ALBUMIN SERPL BCP-MCNC: 3.6 G/DL (ref 3.2–4.9)
ALBUMIN/GLOB SERPL: 1 G/DL
ALP SERPL-CCNC: 100 U/L (ref 30–99)
ALT SERPL-CCNC: 96 U/L (ref 2–50)
ANION GAP SERPL CALC-SCNC: 13 MMOL/L (ref 7–16)
AST SERPL-CCNC: 62 U/L (ref 12–45)
BASOPHILS # BLD AUTO: 1.5 % (ref 0–1.8)
BASOPHILS # BLD: 0.1 K/UL (ref 0–0.12)
BILIRUB SERPL-MCNC: 0.6 MG/DL (ref 0.1–1.5)
BUN SERPL-MCNC: 20 MG/DL (ref 8–22)
CALCIUM SERPL-MCNC: 9.7 MG/DL (ref 8.4–10.2)
CHLORIDE SERPL-SCNC: 101 MMOL/L (ref 96–112)
CO2 SERPL-SCNC: 21 MMOL/L (ref 20–33)
CREAT SERPL-MCNC: 0.52 MG/DL (ref 0.5–1.4)
EKG IMPRESSION: NORMAL
EOSINOPHIL # BLD AUTO: 0.95 K/UL (ref 0–0.51)
EOSINOPHIL NFR BLD: 14.6 % (ref 0–6.9)
ERYTHROCYTE [DISTWIDTH] IN BLOOD BY AUTOMATED COUNT: 43.5 FL (ref 35.9–50)
GLOBULIN SER CALC-MCNC: 3.5 G/DL (ref 1.9–3.5)
GLUCOSE SERPL-MCNC: 112 MG/DL (ref 65–99)
HCT VFR BLD AUTO: 46.8 % (ref 37–47)
HGB BLD-MCNC: 16.2 G/DL (ref 12–16)
IMM GRANULOCYTES # BLD AUTO: 0.02 K/UL (ref 0–0.11)
IMM GRANULOCYTES NFR BLD AUTO: 0.3 % (ref 0–0.9)
LACTATE BLD-SCNC: 1.8 MMOL/L (ref 0.5–2)
LYMPHOCYTES # BLD AUTO: 1.52 K/UL (ref 1–4.8)
LYMPHOCYTES NFR BLD: 23.3 % (ref 22–41)
MCH RBC QN AUTO: 34 PG (ref 27–33)
MCHC RBC AUTO-ENTMCNC: 34.6 G/DL (ref 33.6–35)
MCV RBC AUTO: 98.1 FL (ref 81.4–97.8)
MONOCYTES # BLD AUTO: 0.89 K/UL (ref 0–0.85)
MONOCYTES NFR BLD AUTO: 13.7 % (ref 0–13.4)
NEUTROPHILS # BLD AUTO: 3.03 K/UL (ref 2–7.15)
NEUTROPHILS NFR BLD: 46.6 % (ref 44–72)
NRBC # BLD AUTO: 0 K/UL
NRBC BLD-RTO: 0 /100 WBC
PLATELET # BLD AUTO: 424 K/UL (ref 164–446)
PMV BLD AUTO: 8.5 FL (ref 9–12.9)
POTASSIUM SERPL-SCNC: 3.7 MMOL/L (ref 3.6–5.5)
PROT SERPL-MCNC: 7.1 G/DL (ref 6–8.2)
RBC # BLD AUTO: 4.77 M/UL (ref 4.2–5.4)
SODIUM SERPL-SCNC: 135 MMOL/L (ref 135–145)
TROPONIN T SERPL-MCNC: 8 NG/L (ref 6–19)
WBC # BLD AUTO: 6.5 K/UL (ref 4.8–10.8)

## 2021-09-12 PROCEDURE — 87040 BLOOD CULTURE FOR BACTERIA: CPT | Mod: 91

## 2021-09-12 PROCEDURE — 94760 N-INVAS EAR/PLS OXIMETRY 1: CPT

## 2021-09-12 PROCEDURE — 83605 ASSAY OF LACTIC ACID: CPT

## 2021-09-12 PROCEDURE — 93005 ELECTROCARDIOGRAM TRACING: CPT | Performed by: EMERGENCY MEDICINE

## 2021-09-12 PROCEDURE — 80053 COMPREHEN METABOLIC PANEL: CPT

## 2021-09-12 PROCEDURE — 71045 X-RAY EXAM CHEST 1 VIEW: CPT

## 2021-09-12 PROCEDURE — 99284 EMERGENCY DEPT VISIT MOD MDM: CPT

## 2021-09-12 PROCEDURE — 85025 COMPLETE CBC W/AUTO DIFF WBC: CPT

## 2021-09-12 PROCEDURE — 96374 THER/PROPH/DIAG INJ IV PUSH: CPT

## 2021-09-12 PROCEDURE — 94640 AIRWAY INHALATION TREATMENT: CPT

## 2021-09-12 PROCEDURE — 84484 ASSAY OF TROPONIN QUANT: CPT

## 2021-09-12 PROCEDURE — 700111 HCHG RX REV CODE 636 W/ 250 OVERRIDE (IP): Performed by: EMERGENCY MEDICINE

## 2021-09-12 PROCEDURE — 700101 HCHG RX REV CODE 250: Performed by: EMERGENCY MEDICINE

## 2021-09-12 RX ORDER — IPRATROPIUM BROMIDE AND ALBUTEROL SULFATE 2.5; .5 MG/3ML; MG/3ML
3 SOLUTION RESPIRATORY (INHALATION)
Status: COMPLETED | OUTPATIENT
Start: 2021-09-12 | End: 2021-09-12

## 2021-09-12 RX ORDER — PREDNISONE 20 MG/1
60 TABLET ORAL DAILY
Qty: 12 TABLET | Refills: 0 | Status: SHIPPED | OUTPATIENT
Start: 2021-09-12 | End: 2021-09-16

## 2021-09-12 RX ORDER — METHYLPREDNISOLONE SODIUM SUCCINATE 125 MG/2ML
125 INJECTION, POWDER, LYOPHILIZED, FOR SOLUTION INTRAMUSCULAR; INTRAVENOUS ONCE
Status: COMPLETED | OUTPATIENT
Start: 2021-09-12 | End: 2021-09-12

## 2021-09-12 RX ORDER — ALBUTEROL SULFATE 2.5 MG/3ML
2.5 SOLUTION RESPIRATORY (INHALATION) EVERY 4 HOURS PRN
Qty: 100 ML | Refills: 3 | Status: SHIPPED | OUTPATIENT
Start: 2021-09-12

## 2021-09-12 RX ADMIN — METHYLPREDNISOLONE SODIUM SUCCINATE 125 MG: 125 INJECTION, POWDER, FOR SOLUTION INTRAMUSCULAR; INTRAVENOUS at 15:18

## 2021-09-12 RX ADMIN — IPRATROPIUM BROMIDE AND ALBUTEROL SULFATE 3 ML: .5; 2.5 SOLUTION RESPIRATORY (INHALATION) at 15:39
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
## 2021-09-14 ENCOUNTER — TELEPHONE (OUTPATIENT)
Dept: MEDICAL GROUP | Facility: MEDICAL CENTER | Age: 81
End: 2021-09-14

## 2021-09-14 NOTE — TELEPHONE ENCOUNTER
Phone Number Called: [phone]    Call outcome: Spoke to patient regarding message below.    Message: Called to follow up with patient regarding her voicemail left for us. Patient stated that she was recently in the ER and the ER doctor told her to discuss her medications with her primary care doctor. Patient wanting confirmation that she can restart her normal medications.

## 2021-09-14 NOTE — TELEPHONE ENCOUNTER
Phone Number Called: [phone]    Call outcome: Spoke to patient regarding message below.    Message: Called to inform patient that per Dr. Holbrook, she can continue to take her regular medications.

## 2021-09-17 LAB
BACTERIA BLD CULT: NORMAL
BACTERIA BLD CULT: NORMAL
SIGNIFICANT IND 70042: NORMAL
SIGNIFICANT IND 70042: NORMAL
SITE SITE: NORMAL
SITE SITE: NORMAL
SOURCE SOURCE: NORMAL
SOURCE SOURCE: NORMAL

## 2021-10-08 DIAGNOSIS — M79.89 LEG SWELLING: ICD-10-CM

## 2021-10-10 RX ORDER — TRIAMTERENE AND HYDROCHLOROTHIAZIDE 37.5; 25 MG/1; MG/1
1 TABLET ORAL
Qty: 90 TABLET | Refills: 3 | Status: SHIPPED | OUTPATIENT
Start: 2021-10-10 | End: 2022-11-28

## 2021-10-15 ENCOUNTER — OFFICE VISIT (OUTPATIENT)
Dept: URGENT CARE | Facility: CLINIC | Age: 81
End: 2021-10-15
Payer: MEDICARE

## 2021-10-15 VITALS
SYSTOLIC BLOOD PRESSURE: 134 MMHG | TEMPERATURE: 97.7 F | HEIGHT: 64 IN | RESPIRATION RATE: 18 BRPM | BODY MASS INDEX: 28 KG/M2 | DIASTOLIC BLOOD PRESSURE: 72 MMHG | HEART RATE: 99 BPM | WEIGHT: 164 LBS | OXYGEN SATURATION: 93 %
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
## 2021-10-15 DIAGNOSIS — R06.2 WHEEZING: ICD-10-CM

## 2021-10-15 DIAGNOSIS — Z87.09 HISTORY OF COPD: ICD-10-CM

## 2021-10-15 DIAGNOSIS — S80.812A CAT SCRATCH OF LEFT LOWER LEG, INITIAL ENCOUNTER: ICD-10-CM

## 2021-10-15 DIAGNOSIS — L03.116 CELLULITIS OF LEFT LOWER EXTREMITY: ICD-10-CM

## 2021-10-15 PROCEDURE — 99213 OFFICE O/P EST LOW 20 MIN: CPT | Performed by: NURSE PRACTITIONER

## 2021-10-15 RX ORDER — AZITHROMYCIN 250 MG/1
TABLET, FILM COATED ORAL
Qty: 6 TABLET | Refills: 0 | Status: SHIPPED | OUTPATIENT
Start: 2021-10-15 | End: 2022-01-07

## 2021-10-15 ASSESSMENT — FIBROSIS 4 INDEX: FIB4 SCORE: 1.22

## 2021-10-15 NOTE — PROGRESS NOTES
Dwayne Pittman is a 80 y.o. female who presents with Cat Bite    Past Medical History:   Diagnosis Date   • Allergic rhinitis    • Arthritis    • ASTHMA    • Bronchitis    • COPD (chronic obstructive pulmonary disease) (HCC)    • Daytime sleepiness    • Depression    • H/O varicose vein ligation 3/26/2014   • History of breast cancer 3/26/2014   • Hyperthyroidism    • Intention tremor 9/28/2016   • Lumbar disc disease 3/26/2014   • Migraines 3/26/2014   • Obesity    • Pneumonia    • Pulmonary emphysema (HCC)    • Pulmonary nodules 6/23/2017   • Rheumatoid arthritis (HCC)    • Rheumatoid factor positive 3/26/2014    -CCP, Negative hepatitis panel     • Ringing in ears    • Shortness of breath    • Unspecified disorder of thyroid    • Wears glasses      Social History     Socioeconomic History   • Marital status:      Spouse name: Not on file   • Number of children: Not on file   • Years of education: Not on file   • Highest education level: Not on file   Occupational History   • Not on file   Tobacco Use   • Smoking status: Never Smoker   • Smokeless tobacco: Never Used   Vaping Use   • Vaping Use: Never used   Substance and Sexual Activity   • Alcohol use: Yes     Alcohol/week: 1.8 - 2.4 oz     Types: 3 - 4 Glasses of wine per week     Comment: 3-4   • Drug use: No   • Sexual activity: Not Currently   Other Topics Concern   •  Service No   • Blood Transfusions No   • Caffeine Concern No   • Occupational Exposure No   • Hobby Hazards No   • Sleep Concern No   • Stress Concern Yes   • Weight Concern No   • Special Diet No   • Back Care No   • Exercise No   • Bike Helmet No   • Seat Belt Yes   • Self-Exams No   Social History Narrative   • Not on file     Social Determinants of Health     Financial Resource Strain:    • Difficulty of Paying Living Expenses:    Food Insecurity:    • Worried About Running Out of Food in the Last Year:    • Ran Out of Food in the Last Year:    Transportation  Needs:    • Lack of Transportation (Medical):    • Lack of Transportation (Non-Medical):    Physical Activity:    • Days of Exercise per Week:    • Minutes of Exercise per Session:    Stress:    • Feeling of Stress :    Social Connections:    • Frequency of Communication with Friends and Family:    • Frequency of Social Gatherings with Friends and Family:    • Attends [employer] Services:    • Active Member of Clubs or Organizations:    • Attends Club or Organization Meetings:    • Marital Status:    Intimate Partner Violence:    • Fear of Current or Ex-Partner:    • Emotionally Abused:    • Physically Abused:    • Sexually Abused:      Family History   Problem Relation Age of Onset   • Cancer Mother         breast   • Lung Disease Father    • Cancer Sister         breast, pancreatic   • Cancer Brother    • Cancer Maternal Aunt    • Cancer Maternal Grandmother        Allergies: Asa [aspirin], Quinine, and Sulfa drugs    Patient is a an 80-year-old female who presents today with complaint of a cat scratch to the left lower extremity.  She states this happened over the last 2 weeks.  She states initially this area was much more red and inflamed.  She states it has improved but she is still having some redness and would like this evaluated.    Secondly, patient complains of cough and wheezing.  Patient states she was seen in emergency and diagnosed with exacerbation of COPD.  States she has had 2 courses of steroids.  She does have a pulmonologist that she has not been able to see recently and her next appointment with her primary care physician is in December of this year.          Other  This is a new problem. The current episode started in the past 7 days. The problem occurs constantly. The problem has been unchanged. Nothing aggravates the symptoms. She has tried nothing for the symptoms. The treatment provided no relief.       Review of Systems   Musculoskeletal:        Redness and swelling to the left lower  "extremity.  Patient states this has improved.  No pain or swelling to the thigh or upper portion of the left lower extremity   All other systems reviewed and are negative.             Objective     Resp 18   Ht 1.626 m (5' 4\")   Wt 74.4 kg (164 lb)   BMI 28.15 kg/m²      Physical Exam  Vitals reviewed.   Constitutional:       Appearance: Normal appearance.   Pulmonary:      Breath sounds: Wheezing present.      Comments: Few scattered wheezes to the upper lobes  Musculoskeletal:        Legs:       Comments: There is a superficial abrasion to the anterior left leg with surrounding mild redness.  No drainage.  Area is not warm to palpation.  No point tenderness to the upper thigh area.  No streaking noted.   Neurological:      Mental Status: She is alert.   Psychiatric:         Mood and Affect: Mood normal.         Behavior: Behavior normal.                             Assessment & Plan   Cellulitis, left lower extremity secondary to cat scratch  COPD with wheezing    Patient declined steroid pack at this time  Continue present medications  Zithromax  Keep follow-up appointment with primary care physician  Advised patient also to follow-up with her pulmonologist  ER precautions for respiratory distress or worsening of symptoms     There are no diagnoses linked to this encounter.              "

## 2021-10-30 NOTE — PROCEDURES
PULMONARY FUNCTION TEST INTERPRETATION    The patient had good effort and cooperation.  The results of the test meet the   ATS standards for acceptability and repeatability.    SPIROMETRY:  Showed borderline FVC at 80% of predicted.  FEV1 was decreased to   1.28 liters, 66% of predicted.  FEV1/FVC ratio was decreased to 63%.  There   was no response to bronchodilators.  Flow volume loop was consistent with   obstruction.    LUNG VOLUMES:  Showed normal residual volume at 113% predicted and total lung   capacity at 96% of predicted.    Diffusion capacity was also normal at 114% of predicted.    IMPRESSION:  The patient has moderate restrictive ventilatory defect.  This   could be secondary to chronic obstructive pulmonary disease or asthma or both.    Clinical correlation is required.       ____________________________________     MD JOHNNA Reis / YARI    DD:  06/30/2019 23:56:41  DT:  07/01/2019 01:49:23    D#:  [id number]  Job#:  [id number]  
Awake/Alert

## 2022-01-06 ENCOUNTER — TELEPHONE (OUTPATIENT)
Dept: MEDICAL GROUP | Facility: MEDICAL CENTER | Age: 82
End: 2022-01-06

## 2022-01-06 NOTE — TELEPHONE ENCOUNTER
Patient called stating that she is moving soon and a form needs to be filled out to let her new living facility know that she is able to live independently.

## 2022-01-06 NOTE — TELEPHONE ENCOUNTER
"Phone Number Called: [phone]    Call outcome: Spoke to patient regarding message below.    Message: Called to inform patient that per Dr. Holbrook, \"She needs an appointment.  I have some spots tomorrow and next week that are still blocked for vacation, not sure why.  You can schedule her in those spots.\" Patient scheduled for 01/07/2022 at 2:00PM.       "

## 2022-01-07 ENCOUNTER — OFFICE VISIT (OUTPATIENT)
Dept: MEDICAL GROUP | Facility: MEDICAL CENTER | Age: 82
End: 2022-01-07
Payer: MEDICARE

## 2022-01-07 VITALS
BODY MASS INDEX: 27.83 KG/M2 | WEIGHT: 163 LBS | SYSTOLIC BLOOD PRESSURE: 130 MMHG | DIASTOLIC BLOOD PRESSURE: 70 MMHG | HEART RATE: 77 BPM | HEIGHT: 64 IN | TEMPERATURE: 98 F | RESPIRATION RATE: 16 BRPM | OXYGEN SATURATION: 93 %

## 2022-01-07 DIAGNOSIS — J44.9 CHRONIC OBSTRUCTIVE PULMONARY DISEASE, UNSPECIFIED COPD TYPE (HCC): ICD-10-CM

## 2022-01-07 DIAGNOSIS — E03.4 HYPOTHYROIDISM DUE TO ACQUIRED ATROPHY OF THYROID: ICD-10-CM

## 2022-01-07 DIAGNOSIS — Z85.3 HX OF BREAST CANCER: ICD-10-CM

## 2022-01-07 DIAGNOSIS — J96.11 CHRONIC RESPIRATORY FAILURE WITH HYPOXIA (HCC): ICD-10-CM

## 2022-01-07 DIAGNOSIS — Z23 NEED FOR VACCINATION: ICD-10-CM

## 2022-01-07 DIAGNOSIS — M79.89 LEG SWELLING: ICD-10-CM

## 2022-01-07 PROBLEM — J45.40 MODERATE PERSISTENT ASTHMA WITHOUT COMPLICATION: Status: RESOLVED | Noted: 2018-12-13 | Resolved: 2022-01-07

## 2022-01-07 PROCEDURE — 90662 IIV NO PRSV INCREASED AG IM: CPT | Performed by: FAMILY MEDICINE

## 2022-01-07 PROCEDURE — 99214 OFFICE O/P EST MOD 30 MIN: CPT | Mod: 25 | Performed by: FAMILY MEDICINE

## 2022-01-07 PROCEDURE — G0008 ADMIN INFLUENZA VIRUS VAC: HCPCS | Performed by: FAMILY MEDICINE

## 2022-01-07 ASSESSMENT — FIBROSIS 4 INDEX: FIB4 SCORE: 1.24

## 2022-01-07 ASSESSMENT — PATIENT HEALTH QUESTIONNAIRE - PHQ9: CLINICAL INTERPRETATION OF PHQ2 SCORE: 0

## 2022-01-07 NOTE — PROGRESS NOTES
"  Subjective:     Magdi Pittman is a 81 y.o. female presenting for a follow-up.  Needs paperwork filled out so that she can move into an independent living situation.  she feels well overall.  Continues on her medications regularly.  Denies any side effects.        Current Outpatient Medications:   •  triamterene-hctz (MAXZIDE-25/DYAZIDE) 37.5-25 MG Tab, Take 1 Tablet by mouth every day., Disp: 90 Tablet, Rfl: 3  •  albuterol (PROVENTIL) 2.5mg/3ml Nebu Soln solution for nebulization, Take 3 mL by nebulization every four hours as needed for Shortness of Breath., Disp: 100 mL, Rfl: 3  •  tiotropium (SPIRIVA RESPIMAT) 2.5 mcg/Act Aero Soln, Inhale 2 Inhalation every day., Disp: 4 g, Rfl: 6  •  thyroid (ARMOUR THYROID) 60 MG Tab, Take 1 Tab by mouth every day., Disp: 90 Tab, Rfl: 3  •  albuterol (PROAIR HFA) 108 (90 Base) MCG/ACT Aero Soln inhalation aerosol, Inhale 1-2 Puffs by mouth every 6 hours as needed for Shortness of Breath., Disp: 1 Inhaler, Rfl: 3  •  Cholecalciferol (VITAMIND D3) 1000 UNIT Tab, Take 1,000 Units by mouth every day., Disp: , Rfl:   •  Misc. Devices Misc, 2L oxygen via nasal cannula at night while sleeping, Disp: 1 Units, Rfl: 0  •  Cyanocobalamin (VITAMIN B-12 PO), Take  by mouth every day., Disp: , Rfl:   •  Multiple Vitamin (MULTIVITAMIN PO), Take 1 Tab by mouth every day., Disp: , Rfl:   •  Acetaminophen (TYLENOL ARTHRITIS PAIN PO), Take 650 mg by mouth 2 Times a Day., Disp: , Rfl:     Objective:     Vitals: /70 (BP Location: Right arm, Patient Position: Sitting, BP Cuff Size: Adult)   Pulse 77   Temp 36.7 °C (98 °F) (Temporal)   Resp 16   Ht 1.626 m (5' 4\")   Wt 73.9 kg (163 lb)   SpO2 93%   BMI 27.98 kg/m²   General: Alert  HEENT: Normocephalic.  Heart: Regular rate and rhythm.  S1 and S2 normal.  No murmurs appreciated.  Respiratory: Normal respiratory effort.  Clear to auscultation bilaterally.  Abdomen: Non-distended, soft  Extremities: No leg edema.    Assessment/Plan: "     Magdi was seen today for other.    Diagnoses and all orders for this visit:    Hypothyroidism due to acquired atrophy of thyroid  Chronic, stable, continue levothyroxine.  Follow-up in 3 months  -     CBC WITHOUT DIFFERENTIAL; Future  -     Comp Metabolic Panel; Future  -     TSH; Future    Chronic obstructive pulmonary disease, unspecified COPD type (HCC)  Chronic respiratory failure with hypoxia (HCC)  Chronic, stable, continue current inhalers, oxygen    Leg swelling  Chronic, stable, continue triamterene-hydrochlorothiazide.    Hx of breast cancer  Chronic, stable, continue to monitor.    Need for vaccination  -     Influenza Vaccine, High Dose (65+ Only)      Paperwork for the [facility] filled out      Return in about 3 months (around 4/7/2022) for routine follow up.

## 2022-01-07 NOTE — LETTER
January 7, 2022        Magdi Pittman  [address]  [address]  [place] [address]        Dear Magdi:      Current Outpatient Medications:   •  triamterene-hctz, 1 Tablet, Oral, QDAY  •  albuterol, 2.5 mg, Nebulization, Q4HRS PRN  •  Spiriva Respimat, 5 mcg, Inhalation, DAILY  •  thyroid, 60 mg, Oral, DAILY  •  albuterol, 1-2 Puff, Inhalation, Q6HRS PRN  •  vitamin D, 1,000 Units, Oral, DAILY  •  Misc. Devices, 2L oxygen via nasal cannula at night while sleeping  •  Cyanocobalamin (VITAMIN B-12 PO), Take  by mouth every day.  •  Multiple Vitamin (MULTIVITAMIN PO), 1 Tablet, Oral, DAILY  •  Acetaminophen (TYLENOL ARTHRITIS PAIN PO), 650 mg, Oral, BID          Nicole Holbrook M.D.    Electronically Signed

## 2022-01-07 NOTE — LETTER
January 7, 2022        [place] [id number]  [address]  [facility] [address]    Current Outpatient Medications:   •  triamterene-hctz, 1 Tablet, Oral, daily for leg swelling  •  albuterol, 2.5 mg, Nebulization, Q4HRS PRN for shortness of breath  •  Spiriva Respimat, 5 mcg, Inhalation, DAILY for COPD  •  thyroid, 60 mg, Oral, DAILY for hyoithyroidism  •  albuterol, 1-2 Puff, Inhalation, Q6HRS PRN for shortness of breath  •  vitamin D, 1,000 Units, Oral, DAILY for vitamin deficiency  •  Misc. Devices, 2L oxygen via nasal cannula at night while sleeping for respiratory failure  •  Cyanocobalamin (VITAMIN B-12 PO), Take  by mouth every day for vitamin deficiency  •  Multiple Vitamin (MULTIVITAMIN PO), 1 Tablet, Oral, DAILY for vitamin deficiency  •  Acetaminophen (TYLENOL ARTHRITIS PAIN PO), 650 mg, Oral, BID for arthritis         Sincerely,        Nicole Holbrook M.D.    Electronically Signed

## 2022-01-30 RX ORDER — THYROID 60 MG/1
60 TABLET ORAL
Qty: 90 TABLET | Refills: 3 | Status: SHIPPED | OUTPATIENT
Start: 2022-01-30 | End: 2022-04-26 | Stop reason: SDUPTHER

## 2022-04-12 ENCOUNTER — HOSPITAL ENCOUNTER (OUTPATIENT)
Dept: LAB | Facility: MEDICAL CENTER | Age: 82
End: 2022-04-12
Attending: FAMILY MEDICINE
Payer: MEDICARE

## 2022-04-12 DIAGNOSIS — E03.4 HYPOTHYROIDISM DUE TO ACQUIRED ATROPHY OF THYROID: ICD-10-CM

## 2022-04-12 LAB
ALBUMIN SERPL BCP-MCNC: 4.3 G/DL (ref 3.2–4.9)
ALBUMIN/GLOB SERPL: 1.5 G/DL
ALP SERPL-CCNC: 83 U/L (ref 30–99)
ALT SERPL-CCNC: 82 U/L (ref 2–50)
ANION GAP SERPL CALC-SCNC: 12 MMOL/L (ref 7–16)
AST SERPL-CCNC: 55 U/L (ref 12–45)
BILIRUB SERPL-MCNC: 0.5 MG/DL (ref 0.1–1.5)
BUN SERPL-MCNC: 21 MG/DL (ref 8–22)
CALCIUM SERPL-MCNC: 9.5 MG/DL (ref 8.5–10.5)
CHLORIDE SERPL-SCNC: 99 MMOL/L (ref 96–112)
CO2 SERPL-SCNC: 26 MMOL/L (ref 20–33)
CREAT SERPL-MCNC: 0.62 MG/DL (ref 0.5–1.4)
ERYTHROCYTE [DISTWIDTH] IN BLOOD BY AUTOMATED COUNT: 44.7 FL (ref 35.9–50)
GFR SERPLBLD CREATININE-BSD FMLA CKD-EPI: 88 ML/MIN/1.73 M 2
GLOBULIN SER CALC-MCNC: 2.9 G/DL (ref 1.9–3.5)
GLUCOSE SERPL-MCNC: 81 MG/DL (ref 65–99)
HCT VFR BLD AUTO: 43.8 % (ref 37–47)
HGB BLD-MCNC: 14.8 G/DL (ref 12–16)
MCH RBC QN AUTO: 33.2 PG (ref 27–33)
MCHC RBC AUTO-ENTMCNC: 33.8 G/DL (ref 33.6–35)
MCV RBC AUTO: 98.2 FL (ref 81.4–97.8)
PLATELET # BLD AUTO: 406 K/UL (ref 164–446)
PMV BLD AUTO: 9.8 FL (ref 9–12.9)
POTASSIUM SERPL-SCNC: 3.6 MMOL/L (ref 3.6–5.5)
PROT SERPL-MCNC: 7.2 G/DL (ref 6–8.2)
RBC # BLD AUTO: 4.46 M/UL (ref 4.2–5.4)
SODIUM SERPL-SCNC: 137 MMOL/L (ref 135–145)
TSH SERPL DL<=0.005 MIU/L-ACNC: 1.68 UIU/ML (ref 0.38–5.33)
WBC # BLD AUTO: 7.3 K/UL (ref 4.8–10.8)

## 2022-04-12 PROCEDURE — 85027 COMPLETE CBC AUTOMATED: CPT

## 2022-04-12 PROCEDURE — 36415 COLL VENOUS BLD VENIPUNCTURE: CPT

## 2022-04-12 PROCEDURE — 80053 COMPREHEN METABOLIC PANEL: CPT

## 2022-04-12 PROCEDURE — 84443 ASSAY THYROID STIM HORMONE: CPT

## 2022-04-13 ENCOUNTER — TELEPHONE (OUTPATIENT)
Dept: MEDICAL GROUP | Facility: MEDICAL CENTER | Age: 82
End: 2022-04-13
Payer: MEDICARE

## 2022-04-13 DIAGNOSIS — R74.01 TRANSAMINITIS: ICD-10-CM

## 2022-04-13 NOTE — TELEPHONE ENCOUNTER
Phone Number Called: [phone]    Call outcome: Spoke to patient regarding message below.    Message: Called to inform patient of message below.               ----- Message from Nicole Holbrook M.D. sent at 4/13/2022  6:40 AM PDT -----  Please let pt know that her electrolytes, blood sugar level, kidney function, thyroid lab, blood counts are normal.  Liver labs are slightly irregular.  I have ordered an ultrasound of the liver to evaluate.

## 2022-04-21 ENCOUNTER — HOSPITAL ENCOUNTER (OUTPATIENT)
Dept: RADIOLOGY | Facility: MEDICAL CENTER | Age: 82
End: 2022-04-21
Attending: FAMILY MEDICINE
Payer: MEDICARE

## 2022-04-21 DIAGNOSIS — R74.01 TRANSAMINITIS: ICD-10-CM

## 2022-04-21 PROCEDURE — 76705 ECHO EXAM OF ABDOMEN: CPT

## 2022-04-22 ENCOUNTER — TELEPHONE (OUTPATIENT)
Dept: MEDICAL GROUP | Facility: MEDICAL CENTER | Age: 82
End: 2022-04-22
Payer: MEDICARE

## 2022-04-22 NOTE — TELEPHONE ENCOUNTER
"Phone Number Called: [phone]    Call outcome: Spoke to patient regarding message below.    Message: Called to inform patient that per Dr. HURTADO, \"Please let pt know that her ultrasound shows gallstones.  If they are symptomatic, please let me know, will place a referral to gen surgery\"   "

## 2022-04-26 ENCOUNTER — OFFICE VISIT (OUTPATIENT)
Dept: MEDICAL GROUP | Facility: MEDICAL CENTER | Age: 82
End: 2022-04-26
Payer: MEDICARE

## 2022-04-26 VITALS
WEIGHT: 158 LBS | DIASTOLIC BLOOD PRESSURE: 68 MMHG | TEMPERATURE: 98.1 F | BODY MASS INDEX: 26.98 KG/M2 | HEIGHT: 64 IN | HEART RATE: 76 BPM | RESPIRATION RATE: 16 BRPM | SYSTOLIC BLOOD PRESSURE: 120 MMHG | OXYGEN SATURATION: 96 %

## 2022-04-26 DIAGNOSIS — J44.9 CHRONIC OBSTRUCTIVE PULMONARY DISEASE, UNSPECIFIED COPD TYPE (HCC): ICD-10-CM

## 2022-04-26 DIAGNOSIS — J96.11 CHRONIC RESPIRATORY FAILURE WITH HYPOXIA (HCC): ICD-10-CM

## 2022-04-26 DIAGNOSIS — K80.20 CALCULUS OF GALLBLADDER WITHOUT CHOLECYSTITIS WITHOUT OBSTRUCTION: ICD-10-CM

## 2022-04-26 DIAGNOSIS — E03.4 HYPOTHYROIDISM DUE TO ACQUIRED ATROPHY OF THYROID: ICD-10-CM

## 2022-04-26 DIAGNOSIS — F33.1 MODERATE EPISODE OF RECURRENT MAJOR DEPRESSIVE DISORDER (HCC): ICD-10-CM

## 2022-04-26 DIAGNOSIS — R74.01 TRANSAMINITIS: ICD-10-CM

## 2022-04-26 PROCEDURE — 99214 OFFICE O/P EST MOD 30 MIN: CPT | Performed by: FAMILY MEDICINE

## 2022-04-26 RX ORDER — THYROID 60 MG/1
60 TABLET ORAL
Qty: 90 TABLET | Refills: 3 | Status: SHIPPED | OUTPATIENT
Start: 2022-04-26 | End: 2023-04-13

## 2022-04-26 ASSESSMENT — FIBROSIS 4 INDEX: FIB4 SCORE: 1.24

## 2022-04-26 NOTE — PROGRESS NOTES
"  Subjective:     Magdi Pittman is a 81 y.o. female presenting for a follow up          Current Outpatient Medications:   •  thyroid (ARMOUR THYROID) 60 MG Tab, Take 1 Tablet by mouth every day., Disp: 90 Tablet, Rfl: 3  •  triamterene-hctz (MAXZIDE-25/DYAZIDE) 37.5-25 MG Tab, Take 1 Tablet by mouth every day., Disp: 90 Tablet, Rfl: 3  •  albuterol (PROVENTIL) 2.5mg/3ml Nebu Soln solution for nebulization, Take 3 mL by nebulization every four hours as needed for Shortness of Breath., Disp: 100 mL, Rfl: 3  •  tiotropium (SPIRIVA RESPIMAT) 2.5 mcg/Act Aero Soln, Inhale 2 Inhalation every day., Disp: 4 g, Rfl: 6  •  albuterol (PROAIR HFA) 108 (90 Base) MCG/ACT Aero Soln inhalation aerosol, Inhale 1-2 Puffs by mouth every 6 hours as needed for Shortness of Breath., Disp: 1 Inhaler, Rfl: 3  •  Cholecalciferol (VITAMIND D3) 1000 UNIT Tab, Take 1,000 Units by mouth every day., Disp: , Rfl:   •  Misc. Devices Misc, 2L oxygen via nasal cannula at night while sleeping, Disp: 1 Units, Rfl: 0  •  Acetaminophen (TYLENOL ARTHRITIS PAIN PO), Take 650 mg by mouth 2 Times a Day., Disp: , Rfl:   •  Cyanocobalamin (VITAMIN B-12 PO), Take  by mouth every day., Disp: , Rfl:   •  Multiple Vitamin (MULTIVITAMIN PO), Take 1 Tab by mouth every day., Disp: , Rfl:     Objective:     Vitals: /68   Pulse 76   Temp 36.7 °C (98.1 °F)   Resp 16   Ht 1.626 m (5' 4\")   Wt 71.7 kg (158 lb)   SpO2 96%   BMI 27.12 kg/m²   General: Alert  HEENT: Normocephalic.   Heart: Regular rate and rhythm.  S1 and S2 normal.  No murmurs appreciated.  Respiratory: Normal respiratory effort.  Clear to auscultation bilaterally.  Abdomen: Non-distended, soft  Extremities: No leg edema.    Assessment/Plan:     Magdi was seen today for follow-up.    Diagnoses and all orders for this visit:    Chronic obstructive pulmonary disease, unspecified COPD type (HCC)  Chronic respiratory failure with hypoxia (HCC)  Chronic, stable, continue current inhalers, " oxygen    Calculus of gallbladder without cholecystitis without obstruction  Transaminitis  Chronic, stable, we discussed her labs, ultrasound.  We will continue to monitor for now.  Is currently asymptomatic.  Discussed signs and symptoms to watch for, reasons to go the emergency department    Hypothyroidism due to acquired atrophy of thyroid  Chronic, stable, continue [place] Thyroid  -     thyroid (ARMOUR THYROID) 60 MG Tab; Take 1 Tablet by mouth every day.    Moderate episode of recurrent major depressive disorder (HCC)  Chronic, stable, continue to monitor        Return in about 4 months (around 8/26/2022) for routine follow up.

## 2022-08-04 RX ORDER — ALBUTEROL SULFATE 90 UG/1
1-2 AEROSOL, METERED RESPIRATORY (INHALATION) EVERY 6 HOURS PRN
Qty: 1 EACH | Refills: 3 | Status: SHIPPED | OUTPATIENT
Start: 2022-08-04 | End: 2023-09-12 | Stop reason: SDUPTHER

## 2022-08-04 RX ORDER — TIOTROPIUM BROMIDE INHALATION SPRAY 3.12 UG/1
5 SPRAY, METERED RESPIRATORY (INHALATION) DAILY
Qty: 4 G | Refills: 6 | Status: SHIPPED | OUTPATIENT
Start: 2022-08-04 | End: 2023-02-06

## 2022-08-30 ENCOUNTER — PATIENT OUTREACH (OUTPATIENT)
Dept: HEALTH INFORMATION MANAGEMENT | Facility: OTHER | Age: 82
End: 2022-08-30

## 2022-08-30 ENCOUNTER — OFFICE VISIT (OUTPATIENT)
Dept: MEDICAL GROUP | Facility: MEDICAL CENTER | Age: 82
End: 2022-08-30
Payer: MEDICARE

## 2022-08-30 VITALS
SYSTOLIC BLOOD PRESSURE: 124 MMHG | DIASTOLIC BLOOD PRESSURE: 72 MMHG | HEIGHT: 64 IN | WEIGHT: 162 LBS | HEART RATE: 66 BPM | TEMPERATURE: 97.8 F | OXYGEN SATURATION: 95 % | BODY MASS INDEX: 27.66 KG/M2 | RESPIRATION RATE: 16 BRPM

## 2022-08-30 DIAGNOSIS — J44.9 CHRONIC OBSTRUCTIVE PULMONARY DISEASE, UNSPECIFIED COPD TYPE (HCC): ICD-10-CM

## 2022-08-30 DIAGNOSIS — R74.01 TRANSAMINITIS: ICD-10-CM

## 2022-08-30 DIAGNOSIS — K80.20 CALCULUS OF GALLBLADDER WITHOUT CHOLECYSTITIS WITHOUT OBSTRUCTION: ICD-10-CM

## 2022-08-30 DIAGNOSIS — M79.89 LEG SWELLING: ICD-10-CM

## 2022-08-30 DIAGNOSIS — J96.11 CHRONIC RESPIRATORY FAILURE WITH HYPOXIA (HCC): ICD-10-CM

## 2022-08-30 PROBLEM — K76.9 HEPATIC LESION: Status: RESOLVED | Noted: 2017-06-23 | Resolved: 2022-08-30

## 2022-08-30 PROCEDURE — 99214 OFFICE O/P EST MOD 30 MIN: CPT | Performed by: FAMILY MEDICINE

## 2022-08-30 RX ORDER — FUROSEMIDE 20 MG/1
20 TABLET ORAL
Qty: 90 TABLET | Refills: 0 | Status: SHIPPED | OUTPATIENT
Start: 2022-08-30 | End: 2022-11-30

## 2022-08-30 ASSESSMENT — FIBROSIS 4 INDEX: FIB4 SCORE: 1.24

## 2022-08-30 NOTE — PROGRESS NOTES
"  Subjective:     Magdi Pittman is a 81 y.o. female presenting for a follow-up        Current Outpatient Medications:     furosemide (LASIX) 20 MG Tab, Take 1 Tablet by mouth 1 time a day as needed (leg swelling)., Disp: 90 Tablet, Rfl: 0    tiotropium (SPIRIVA RESPIMAT) 2.5 mcg/Act Aero Soln, Inhale 2 Inhalation every day., Disp: 4 g, Rfl: 6    albuterol (PROAIR HFA) 108 (90 Base) MCG/ACT Aero Soln inhalation aerosol, Inhale 1-2 Puffs every 6 hours as needed for Shortness of Breath., Disp: 1 Each, Rfl: 3    thyroid (ARMOUR THYROID) 60 MG Tab, Take 1 Tablet by mouth every day., Disp: 90 Tablet, Rfl: 3    triamterene-hctz (MAXZIDE-25/DYAZIDE) 37.5-25 MG Tab, Take 1 Tablet by mouth every day., Disp: 90 Tablet, Rfl: 3    albuterol (PROVENTIL) 2.5mg/3ml Nebu Soln solution for nebulization, Take 3 mL by nebulization every four hours as needed for Shortness of Breath., Disp: 100 mL, Rfl: 3    Cholecalciferol (VITAMIND D3) 1000 UNIT Tab, Take 1,000 Units by mouth every day., Disp: , Rfl:     Misc. Devices Misc, 2L oxygen via nasal cannula at night while sleeping, Disp: 1 Units, Rfl: 0    Acetaminophen (TYLENOL ARTHRITIS PAIN PO), Take 650 mg by mouth 2 Times a Day., Disp: , Rfl:     Cyanocobalamin (VITAMIN B-12 PO), Take  by mouth every day., Disp: , Rfl:     Multiple Vitamin (MULTIVITAMIN PO), Take 1 Tab by mouth every day., Disp: , Rfl:     Objective:     Vitals: /72   Pulse 66   Temp 36.6 °C (97.8 °F)   Resp 16   Ht 1.626 m (5' 4\")   Wt 73.5 kg (162 lb)   SpO2 95%   BMI 27.81 kg/m²   General: Alert  HEENT: Normocephalic.   Heart: Regular rate and rhythm.  S1 and S2 normal.  No murmurs appreciated.  Respiratory: Normal respiratory effort.  Clear to auscultation bilaterally.  Abdomen: Non-distended, soft  Extremities: Nonpitting leg edema bilaterally.    Assessment/Plan:     Magdi was seen today for follow-up.    Diagnoses and all orders for this visit:    Transaminitis  Possible self-limited issue.  She " reports that she has been drinking alcohol lately, is planning to cut down.  We will recheck labs  -     CBC WITHOUT DIFFERENTIAL; Future  -     Comp Metabolic Panel; Future  -     LIPASE; Future  -     FERRITIN; Future  -     IRON/TOTAL IRON BIND; Future    Calculus of gallbladder without cholecystitis without obstruction  Chronic, asymptomatic.  She is not interested in referral to surgery yet.  -     CBC WITHOUT DIFFERENTIAL; Future  -     Comp Metabolic Panel; Future  -     LIPASE; Future    Leg swelling  Chronic, worsening.  Continue with triamterene-hydrochlorothiazide, will add furosemide as needed  -     Comp Metabolic Panel; Future  -     furosemide (LASIX) 20 MG Tab; Take 1 Tablet by mouth 1 time a day as needed (leg swelling).    Chronic obstructive pulmonary disease, unspecified COPD type (HCC)  Chronic respiratory failure with hypoxia (HCC)  Chronic, stable, continue current inhalers.        Return in about 4 months (around 12/30/2022) for routine follow up.

## 2022-08-30 NOTE — PROGRESS NOTES
Met patient in clinic after her visit with Dr. Holbrook. Presented patient with the Personal Care Management Program. Explained benefits of program to patient and provided her with a PCM program brochure with this RN's name and program phone number. Patient in a hurry but would like to look at brochure and a call back tomorrow to further explain program to her. This RN will call patient tomorrow, 8/31/2022.

## 2022-09-09 ENCOUNTER — PATIENT OUTREACH (OUTPATIENT)
Dept: HEALTH INFORMATION MANAGEMENT | Facility: OTHER | Age: 82
End: 2022-09-09
Payer: MEDICARE

## 2022-09-09 DIAGNOSIS — J44.9 CHRONIC OBSTRUCTIVE PULMONARY DISEASE, UNSPECIFIED COPD TYPE (HCC): ICD-10-CM

## 2022-09-09 NOTE — PROGRESS NOTES
Completed follow-up call with patient to see if she decided to enroll in the Personal Care Management Program. Further education provided to patient about the PCM program and the benefits. Patient declines at this time. Patient states she has the PCM program brochure and phone number and if her health or anything changes, she will call this RN.

## 2022-10-18 ENCOUNTER — HOSPITAL ENCOUNTER (OUTPATIENT)
Dept: LAB | Facility: MEDICAL CENTER | Age: 82
End: 2022-10-18
Attending: FAMILY MEDICINE
Payer: MEDICARE

## 2022-10-18 DIAGNOSIS — R74.01 TRANSAMINITIS: ICD-10-CM

## 2022-10-18 DIAGNOSIS — M79.89 LEG SWELLING: ICD-10-CM

## 2022-10-18 DIAGNOSIS — K80.20 CALCULUS OF GALLBLADDER WITHOUT CHOLECYSTITIS WITHOUT OBSTRUCTION: ICD-10-CM

## 2022-10-18 LAB
ALBUMIN SERPL BCP-MCNC: 4.3 G/DL (ref 3.2–4.9)
ALBUMIN/GLOB SERPL: 1.3 G/DL
ALP SERPL-CCNC: 78 U/L (ref 30–99)
ALT SERPL-CCNC: 93 U/L (ref 2–50)
ANION GAP SERPL CALC-SCNC: 15 MMOL/L (ref 7–16)
AST SERPL-CCNC: 64 U/L (ref 12–45)
BILIRUB SERPL-MCNC: 0.9 MG/DL (ref 0.1–1.5)
BUN SERPL-MCNC: 25 MG/DL (ref 8–22)
CALCIUM SERPL-MCNC: 9.8 MG/DL (ref 8.5–10.5)
CHLORIDE SERPL-SCNC: 98 MMOL/L (ref 96–112)
CO2 SERPL-SCNC: 28 MMOL/L (ref 20–33)
CREAT SERPL-MCNC: 0.72 MG/DL (ref 0.5–1.4)
ERYTHROCYTE [DISTWIDTH] IN BLOOD BY AUTOMATED COUNT: 43.6 FL (ref 35.9–50)
FERRITIN SERPL-MCNC: 1117 NG/ML (ref 10–291)
GFR SERPLBLD CREATININE-BSD FMLA CKD-EPI: 82 ML/MIN/1.73 M 2
GLOBULIN SER CALC-MCNC: 3.4 G/DL (ref 1.9–3.5)
GLUCOSE SERPL-MCNC: 88 MG/DL (ref 65–99)
HCT VFR BLD AUTO: 44.1 % (ref 37–47)
HGB BLD-MCNC: 15.1 G/DL (ref 12–16)
IRON SATN MFR SERPL: 93 % (ref 15–55)
IRON SERPL-MCNC: 225 UG/DL (ref 40–170)
LIPASE SERPL-CCNC: 18 U/L (ref 11–82)
MCH RBC QN AUTO: 33.5 PG (ref 27–33)
MCHC RBC AUTO-ENTMCNC: 34.2 G/DL (ref 33.6–35)
MCV RBC AUTO: 97.8 FL (ref 81.4–97.8)
PLATELET # BLD AUTO: 382 K/UL (ref 164–446)
PMV BLD AUTO: 9.7 FL (ref 9–12.9)
POTASSIUM SERPL-SCNC: 3 MMOL/L (ref 3.6–5.5)
PROT SERPL-MCNC: 7.7 G/DL (ref 6–8.2)
RBC # BLD AUTO: 4.51 M/UL (ref 4.2–5.4)
SODIUM SERPL-SCNC: 141 MMOL/L (ref 135–145)
TIBC SERPL-MCNC: 242 UG/DL (ref 250–450)
UIBC SERPL-MCNC: <17 UG/DL (ref 110–370)
WBC # BLD AUTO: 6.3 K/UL (ref 4.8–10.8)

## 2022-10-18 PROCEDURE — 83550 IRON BINDING TEST: CPT

## 2022-10-18 PROCEDURE — 83690 ASSAY OF LIPASE: CPT

## 2022-10-18 PROCEDURE — 82728 ASSAY OF FERRITIN: CPT

## 2022-10-18 PROCEDURE — 36415 COLL VENOUS BLD VENIPUNCTURE: CPT

## 2022-10-18 PROCEDURE — 83540 ASSAY OF IRON: CPT

## 2022-10-18 PROCEDURE — 85027 COMPLETE CBC AUTOMATED: CPT

## 2022-10-18 PROCEDURE — 80053 COMPREHEN METABOLIC PANEL: CPT

## 2022-11-26 DIAGNOSIS — M79.89 LEG SWELLING: ICD-10-CM

## 2022-11-28 RX ORDER — TRIAMTERENE AND HYDROCHLOROTHIAZIDE 37.5; 25 MG/1; MG/1
1 TABLET ORAL
Qty: 90 TABLET | Refills: 3 | Status: SHIPPED | OUTPATIENT
Start: 2022-11-28 | End: 2023-09-12 | Stop reason: SDUPTHER

## 2022-11-30 RX ORDER — FUROSEMIDE 20 MG/1
20 TABLET ORAL
Qty: 90 TABLET | Refills: 0 | Status: SHIPPED | OUTPATIENT
Start: 2022-11-30 | End: 2023-01-16

## 2022-12-12 ENCOUNTER — APPOINTMENT (OUTPATIENT)
Dept: SLEEP MEDICINE | Facility: MEDICAL CENTER | Age: 82
End: 2022-12-12
Payer: MEDICARE

## 2022-12-22 ENCOUNTER — OFFICE VISIT (OUTPATIENT)
Dept: MEDICAL GROUP | Facility: MEDICAL CENTER | Age: 82
End: 2022-12-22
Payer: MEDICARE

## 2022-12-22 VITALS
HEART RATE: 68 BPM | RESPIRATION RATE: 16 BRPM | DIASTOLIC BLOOD PRESSURE: 74 MMHG | HEIGHT: 64 IN | TEMPERATURE: 97.9 F | OXYGEN SATURATION: 98 % | SYSTOLIC BLOOD PRESSURE: 122 MMHG | WEIGHT: 158 LBS | BODY MASS INDEX: 26.98 KG/M2

## 2022-12-22 DIAGNOSIS — R53.83 OTHER FATIGUE: ICD-10-CM

## 2022-12-22 DIAGNOSIS — E87.6 HYPOKALEMIA: ICD-10-CM

## 2022-12-22 DIAGNOSIS — R74.01 TRANSAMINITIS: ICD-10-CM

## 2022-12-22 DIAGNOSIS — Z85.3 HX OF BREAST CANCER: ICD-10-CM

## 2022-12-22 DIAGNOSIS — R63.4 WEIGHT LOSS: ICD-10-CM

## 2022-12-22 DIAGNOSIS — E03.4 HYPOTHYROIDISM DUE TO ACQUIRED ATROPHY OF THYROID: ICD-10-CM

## 2022-12-22 DIAGNOSIS — J44.9 CHRONIC OBSTRUCTIVE PULMONARY DISEASE, UNSPECIFIED COPD TYPE (HCC): ICD-10-CM

## 2022-12-22 PROCEDURE — 99214 OFFICE O/P EST MOD 30 MIN: CPT | Performed by: FAMILY MEDICINE

## 2022-12-22 ASSESSMENT — FIBROSIS 4 INDEX: FIB4 SCORE: 1.46

## 2022-12-22 NOTE — PROGRESS NOTES
"  Subjective:     Magdi Pittman is a 82 y.o. female presenting for a follow up          Current Outpatient Medications:     furosemide (LASIX) 20 MG Tab, Take 1 Tablet by mouth 1 time a day as needed (leg swelling)., Disp: 90 Tablet, Rfl: 0    triamterene-hctz (MAXZIDE-25/DYAZIDE) 37.5-25 MG Tab, Take 1 Tablet by mouth every day., Disp: 90 Tablet, Rfl: 3    tiotropium (SPIRIVA RESPIMAT) 2.5 mcg/Act Aero Soln, Inhale 2 Inhalation every day., Disp: 4 g, Rfl: 6    albuterol (PROAIR HFA) 108 (90 Base) MCG/ACT Aero Soln inhalation aerosol, Inhale 1-2 Puffs every 6 hours as needed for Shortness of Breath., Disp: 1 Each, Rfl: 3    thyroid (ARMOUR THYROID) 60 MG Tab, Take 1 Tablet by mouth every day., Disp: 90 Tablet, Rfl: 3    albuterol (PROVENTIL) 2.5mg/3ml Nebu Soln solution for nebulization, Take 3 mL by nebulization every four hours as needed for Shortness of Breath., Disp: 100 mL, Rfl: 3    Cholecalciferol (VITAMIND D3) 1000 UNIT Tab, Take 1,000 Units by mouth every day., Disp: , Rfl:     Misc. Devices Misc, 2L oxygen via nasal cannula at night while sleeping, Disp: 1 Units, Rfl: 0    Acetaminophen (TYLENOL ARTHRITIS PAIN PO), Take 650 mg by mouth 2 Times a Day., Disp: , Rfl:     Cyanocobalamin (VITAMIN B-12 PO), Take  by mouth every day., Disp: , Rfl:     Multiple Vitamin (MULTIVITAMIN PO), Take 1 Tab by mouth every day., Disp: , Rfl:     Objective:     Vitals: /74   Pulse 68   Temp 36.6 °C (97.9 °F)   Resp 16   Ht 1.626 m (5' 4\")   Wt 71.7 kg (158 lb)   SpO2 98%   BMI 27.12 kg/m²   General: Alert  HEENT: Normocephalic.     Assessment/Plan:     Magdi was seen today for follow-up.    Diagnoses and all orders for this visit:    Other fatigue  Weight loss  Undiagnosed new problem with uncertain prognosis.  She continues to report persistent fatigue.  She is trying to find a purpose in life, is looking into volunteering.  She has lost some weight since her last visit, appetite is not as good as it used to " be.  She has cut down on her alcohol intake.  We discussed checking the following labs, CT.  -     Comp Metabolic Panel; Future  -     CBC WITHOUT DIFFERENTIAL; Future  -     CT-CHEST,ABDOMEN,PELVIS WITH; Future    Transaminitis  Chronic, stable, possibly improved now that she is cut down her alcohol intake.  She has cut down from 1-2 drinks per day down to 1 to 2 glasses of wine per week  -     Comp Metabolic Panel; Future  -     CT-CHEST,ABDOMEN,PELVIS WITH; Future    Hx of breast cancer  Chronic, stable  -     CT-CHEST,ABDOMEN,PELVIS WITH; Future    Hypokalemia  We will recheck metabolic panel    Hypothyroidism due to acquired atrophy of thyroid  Chronic, stable, will recheck a TSH  -     TSH; Future    Chronic obstructive pulmonary disease, unspecified COPD type (HCC)  Chronic, stable, although we discussed stopping her Spiriva to see if this may help with her fatigue.        Return in about 3 months (around 3/22/2023) for routine follow up.

## 2023-01-09 ENCOUNTER — APPOINTMENT (OUTPATIENT)
Dept: SLEEP MEDICINE | Facility: MEDICAL CENTER | Age: 83
End: 2023-01-09
Payer: MEDICARE

## 2023-01-12 ENCOUNTER — HOSPITAL ENCOUNTER (OUTPATIENT)
Dept: LAB | Facility: MEDICAL CENTER | Age: 83
End: 2023-01-12
Attending: FAMILY MEDICINE
Payer: MEDICARE

## 2023-01-12 DIAGNOSIS — R63.4 WEIGHT LOSS: ICD-10-CM

## 2023-01-12 DIAGNOSIS — R74.01 TRANSAMINITIS: ICD-10-CM

## 2023-01-12 DIAGNOSIS — R53.83 OTHER FATIGUE: ICD-10-CM

## 2023-01-12 DIAGNOSIS — E03.4 HYPOTHYROIDISM DUE TO ACQUIRED ATROPHY OF THYROID: ICD-10-CM

## 2023-01-12 LAB
ALBUMIN SERPL BCP-MCNC: 4.2 G/DL (ref 3.2–4.9)
ALBUMIN/GLOB SERPL: 1.2 G/DL
ALP SERPL-CCNC: 85 U/L (ref 30–99)
ALT SERPL-CCNC: 108 U/L (ref 2–50)
ANION GAP SERPL CALC-SCNC: 11 MMOL/L (ref 7–16)
AST SERPL-CCNC: 72 U/L (ref 12–45)
BILIRUB SERPL-MCNC: 0.6 MG/DL (ref 0.1–1.5)
BUN SERPL-MCNC: 25 MG/DL (ref 8–22)
CALCIUM ALBUM COR SERPL-MCNC: 9.8 MG/DL (ref 8.5–10.5)
CALCIUM SERPL-MCNC: 10 MG/DL (ref 8.5–10.5)
CHLORIDE SERPL-SCNC: 97 MMOL/L (ref 96–112)
CO2 SERPL-SCNC: 28 MMOL/L (ref 20–33)
CREAT SERPL-MCNC: 0.79 MG/DL (ref 0.5–1.4)
ERYTHROCYTE [DISTWIDTH] IN BLOOD BY AUTOMATED COUNT: 43.1 FL (ref 35.9–50)
GFR SERPLBLD CREATININE-BSD FMLA CKD-EPI: 74 ML/MIN/1.73 M 2
GLOBULIN SER CALC-MCNC: 3.4 G/DL (ref 1.9–3.5)
GLUCOSE SERPL-MCNC: 85 MG/DL (ref 65–99)
HCT VFR BLD AUTO: 43.4 % (ref 37–47)
HGB BLD-MCNC: 15 G/DL (ref 12–16)
MCH RBC QN AUTO: 33.4 PG (ref 27–33)
MCHC RBC AUTO-ENTMCNC: 34.6 G/DL (ref 33.6–35)
MCV RBC AUTO: 96.7 FL (ref 81.4–97.8)
PLATELET # BLD AUTO: 413 K/UL (ref 164–446)
PMV BLD AUTO: 9.7 FL (ref 9–12.9)
POTASSIUM SERPL-SCNC: 3.3 MMOL/L (ref 3.6–5.5)
PROT SERPL-MCNC: 7.6 G/DL (ref 6–8.2)
RBC # BLD AUTO: 4.49 M/UL (ref 4.2–5.4)
SODIUM SERPL-SCNC: 136 MMOL/L (ref 135–145)
TSH SERPL DL<=0.005 MIU/L-ACNC: 0.85 UIU/ML (ref 0.38–5.33)
WBC # BLD AUTO: 8 K/UL (ref 4.8–10.8)

## 2023-01-12 PROCEDURE — 80053 COMPREHEN METABOLIC PANEL: CPT

## 2023-01-12 PROCEDURE — 84443 ASSAY THYROID STIM HORMONE: CPT

## 2023-01-12 PROCEDURE — 36415 COLL VENOUS BLD VENIPUNCTURE: CPT

## 2023-01-12 PROCEDURE — 85027 COMPLETE CBC AUTOMATED: CPT

## 2023-01-13 RX ORDER — POTASSIUM CHLORIDE 20 MEQ/1
20 TABLET, EXTENDED RELEASE ORAL DAILY
Qty: 90 TABLET | Refills: 3 | Status: SHIPPED | OUTPATIENT
Start: 2023-01-13 | End: 2023-09-12 | Stop reason: SDUPTHER

## 2023-01-16 RX ORDER — FUROSEMIDE 20 MG/1
20 TABLET ORAL
Qty: 90 TABLET | Refills: 3 | Status: SHIPPED | OUTPATIENT
Start: 2023-01-16 | End: 2023-09-12 | Stop reason: SDUPTHER

## 2023-01-26 ENCOUNTER — HOSPITAL ENCOUNTER (OUTPATIENT)
Dept: RADIOLOGY | Facility: MEDICAL CENTER | Age: 83
End: 2023-01-26
Attending: FAMILY MEDICINE
Payer: MEDICARE

## 2023-01-26 DIAGNOSIS — Z85.3 HX OF BREAST CANCER: ICD-10-CM

## 2023-01-26 DIAGNOSIS — R74.01 TRANSAMINITIS: ICD-10-CM

## 2023-01-26 DIAGNOSIS — R63.4 WEIGHT LOSS: ICD-10-CM

## 2023-01-26 DIAGNOSIS — R53.83 OTHER FATIGUE: ICD-10-CM

## 2023-01-26 PROCEDURE — 71260 CT THORAX DX C+: CPT

## 2023-01-26 PROCEDURE — 700117 HCHG RX CONTRAST REV CODE 255: Performed by: FAMILY MEDICINE

## 2023-01-26 RX ADMIN — IOHEXOL 100 ML: 350 INJECTION, SOLUTION INTRAVENOUS at 15:39

## 2023-01-26 RX ADMIN — IOHEXOL 20 ML: 240 INJECTION, SOLUTION INTRATHECAL; INTRAVASCULAR; INTRAVENOUS; ORAL at 15:39

## 2023-02-06 ENCOUNTER — OFFICE VISIT (OUTPATIENT)
Dept: MEDICAL GROUP | Facility: MEDICAL CENTER | Age: 83
End: 2023-02-06
Payer: MEDICARE

## 2023-02-06 VITALS
SYSTOLIC BLOOD PRESSURE: 122 MMHG | TEMPERATURE: 97.9 F | HEIGHT: 64 IN | HEART RATE: 68 BPM | DIASTOLIC BLOOD PRESSURE: 72 MMHG | RESPIRATION RATE: 16 BRPM | OXYGEN SATURATION: 97 % | WEIGHT: 158 LBS | BODY MASS INDEX: 26.98 KG/M2

## 2023-02-06 DIAGNOSIS — R53.83 OTHER FATIGUE: ICD-10-CM

## 2023-02-06 DIAGNOSIS — E87.6 HYPOKALEMIA: ICD-10-CM

## 2023-02-06 DIAGNOSIS — F33.1 MODERATE EPISODE OF RECURRENT MAJOR DEPRESSIVE DISORDER (HCC): ICD-10-CM

## 2023-02-06 DIAGNOSIS — K80.20 CALCULUS OF GALLBLADDER WITHOUT CHOLECYSTITIS WITHOUT OBSTRUCTION: ICD-10-CM

## 2023-02-06 DIAGNOSIS — M79.89 LEG SWELLING: ICD-10-CM

## 2023-02-06 DIAGNOSIS — J44.9 CHRONIC OBSTRUCTIVE PULMONARY DISEASE, UNSPECIFIED COPD TYPE (HCC): ICD-10-CM

## 2023-02-06 DIAGNOSIS — J96.11 CHRONIC RESPIRATORY FAILURE WITH HYPOXIA (HCC): ICD-10-CM

## 2023-02-06 PROCEDURE — 99214 OFFICE O/P EST MOD 30 MIN: CPT | Performed by: FAMILY MEDICINE

## 2023-02-06 ASSESSMENT — PATIENT HEALTH QUESTIONNAIRE - PHQ9
5. POOR APPETITE OR OVEREATING: NOT AT ALL
7. TROUBLE CONCENTRATING ON THINGS, SUCH AS READING THE NEWSPAPER OR WATCHING TELEVISION: SEVERAL DAYS
6. FEELING BAD ABOUT YOURSELF - OR THAT YOU ARE A FAILURE OR HAVE LET YOURSELF OR YOUR FAMILY DOWN: NOT AL ALL
9. THOUGHTS THAT YOU WOULD BE BETTER OFF DEAD, OR OF HURTING YOURSELF: NOT AT ALL
1. LITTLE INTEREST OR PLEASURE IN DOING THINGS: SEVERAL DAYS
4. FEELING TIRED OR HAVING LITTLE ENERGY: NOT AT ALL
8. MOVING OR SPEAKING SO SLOWLY THAT OTHER PEOPLE COULD HAVE NOTICED. OR THE OPPOSITE, BEING SO FIGETY OR RESTLESS THAT YOU HAVE BEEN MOVING AROUND A LOT MORE THAN USUAL: NOT AT ALL
SUM OF ALL RESPONSES TO PHQ QUESTIONS 1-9: 5
2. FEELING DOWN, DEPRESSED, IRRITABLE, OR HOPELESS: MORE THAN HALF THE DAYS
3. TROUBLE FALLING OR STAYING ASLEEP OR SLEEPING TOO MUCH: SEVERAL DAYS
SUM OF ALL RESPONSES TO PHQ9 QUESTIONS 1 AND 2: 3

## 2023-02-06 ASSESSMENT — FIBROSIS 4 INDEX: FIB4 SCORE: 1.41

## 2023-02-06 NOTE — PROGRESS NOTES
"  Subjective:     Magdi Pittman is a 82 y.o. female presenting for a follow up          Current Outpatient Medications:     furosemide (LASIX) 20 MG Tab, TAKE 1 TABLET BY MOUTH 1 TIME A DAY AS NEEDED (LEG SWELLING)., Disp: 90 Tablet, Rfl: 3    potassium chloride SA (KDUR) 20 MEQ Tab CR, Take 1 Tablet by mouth every day., Disp: 90 Tablet, Rfl: 3    triamterene-hctz (MAXZIDE-25/DYAZIDE) 37.5-25 MG Tab, Take 1 Tablet by mouth every day., Disp: 90 Tablet, Rfl: 3    albuterol (PROAIR HFA) 108 (90 Base) MCG/ACT Aero Soln inhalation aerosol, Inhale 1-2 Puffs every 6 hours as needed for Shortness of Breath., Disp: 1 Each, Rfl: 3    thyroid (ARMOUR THYROID) 60 MG Tab, Take 1 Tablet by mouth every day., Disp: 90 Tablet, Rfl: 3    albuterol (PROVENTIL) 2.5mg/3ml Nebu Soln solution for nebulization, Take 3 mL by nebulization every four hours as needed for Shortness of Breath., Disp: 100 mL, Rfl: 3    Cholecalciferol (VITAMIND D3) 1000 UNIT Tab, Take 1,000 Units by mouth every day., Disp: , Rfl:     Misc. Devices Misc, 2L oxygen via nasal cannula at night while sleeping, Disp: 1 Units, Rfl: 0    Acetaminophen (TYLENOL ARTHRITIS PAIN PO), Take 650 mg by mouth 2 Times a Day., Disp: , Rfl:     Cyanocobalamin (VITAMIN B-12 PO), Take  by mouth every day., Disp: , Rfl:     Multiple Vitamin (MULTIVITAMIN PO), Take 1 Tab by mouth every day., Disp: , Rfl:     Objective:     Vitals: /72   Pulse 68   Temp 36.6 °C (97.9 °F)   Resp 16   Ht 1.626 m (5' 4\")   Wt 71.7 kg (158 lb)   SpO2 97%   BMI 27.12 kg/m²   General: Alert  HEENT: Normocephalic.    Assessment/Plan:     Magdi was seen today for follow-up.    Diagnoses and all orders for this visit:    Hypokalemia  Chronic, stable. She started the potassium and her fatigue is somewhat better.  We reviewed her recent labs.  We discussed continue with the potassium supplement while she is on furosemide    Leg swelling  Chronic, stable, continue furosemide and " triamterene/hydrochlorothiazide.  We discussed that if she stops her furosemide, she may be able to stop potassium as well    Calculus of gallbladder without cholecystitis without obstruction  Chronic, stable, we reviewed her CT scan.  She was not interested in surgery.  Discussed signs and symptoms to watch for, reasons to return    Chronic obstructive pulmonary disease, unspecified COPD type (HCC)  Chronic respiratory failure with hypoxia (HCC)  Other fatigue  Chronic, stable.  She stopped the Spiriva and found that her fatigue has improved somewhat.  We will continue to monitor.    Moderate episode of recurrent major depressive disorder (HCC)  Chronic, stable, continue to monitor.  She is still not interested in trying a medication.        Return in about 3 months (around 5/6/2023) for routine follow up.

## 2023-02-16 ENCOUNTER — TELEPHONE (OUTPATIENT)
Dept: MEDICAL GROUP | Facility: MEDICAL CENTER | Age: 83
End: 2023-02-16
Payer: MEDICARE

## 2023-02-16 NOTE — TELEPHONE ENCOUNTER
Pt called asking three questions:      Pt stated there will be someone coming from the [facility] to do shingles vaccines next week and pt wanted to know if she should get one or not.    Pt stated she was told to discontinue using the spiriva, pt asked if she could use the spiriva PRN    Pt was also told she could discontinue the furosemide, pt asked if she could use the furosemide every couple of days if the swelling gets too bad

## 2023-03-23 NOTE — PROGRESS NOTES
Subjective:     Magdi Pittman is a 80 y.o. female presenting for a follow-up of pneumonia.  Since her last visit, her fevers and body aches have resolved.  She continues to have a persistent cough, but this is somewhat better.  She has been feeling short of breath in the morning for the past 2 days, but this improves by the daytime.  She continues on her Spiriva inhaler, has not needed her albuterol inhaler recently.  She completed her antibiotics and steroids.  She is still somewhat hesitant to try the propranolol for her tremors.  Has concerns about side effects, possibly worsening her eye issues, COPD.        Current Outpatient Medications:   •  tiotropium (SPIRIVA RESPIMAT) 2.5 mcg/Act Aero Soln, Inhale 2 Inhalation every day., Disp: 4 g, Rfl: 6  •  thyroid (ARMOUR THYROID) 60 MG Tab, Take 1 Tab by mouth every day., Disp: 90 Tab, Rfl: 3  •  triamterene-hctz (MAXZIDE-25/DYAZIDE) 37.5-25 MG Tab, Take 1 Tab by mouth every day., Disp: 90 Tab, Rfl: 3  •  albuterol (PROVENTIL) 2.5mg/3ml Nebu Soln solution for nebulization, 3 mL by Nebulization route every four hours as needed for Shortness of Breath., Disp: 30 Bullet, Rfl: 3  •  albuterol (PROAIR HFA) 108 (90 Base) MCG/ACT Aero Soln inhalation aerosol, Inhale 1-2 Puffs by mouth every 6 hours as needed for Shortness of Breath., Disp: 1 Inhaler, Rfl: 3  •  Cholecalciferol (VITAMIND D3) 1000 UNIT Tab, Take 1,000 Units by mouth every day., Disp: , Rfl:   •  Misc. Devices Misc, 2L oxygen via nasal cannula at night while sleeping, Disp: 1 Units, Rfl: 0  •  Acetaminophen (TYLENOL ARTHRITIS PAIN PO), Take 650 mg by mouth 2 Times a Day., Disp: , Rfl:   •  Cyanocobalamin (VITAMIN B-12 PO), Take  by mouth every day., Disp: , Rfl:   •  Multiple Vitamin (MULTIVITAMIN PO), Take 1 Tab by mouth every day., Disp: , Rfl:     Objective:     Vitals: /60 (BP Location: Right arm, Patient Position: Sitting, BP Cuff Size: Adult)   Pulse 77   Temp 36.9 °C (98.4 °F) (Temporal)   Ht  "1.626 m (5' 4\")   Wt 71 kg (156 lb 9.6 oz)   SpO2 92%   BMI 26.88 kg/m²   General: Alert  HEENT: Normocephalic.   Heart: Regular rate and rhythm.  S1 and S2 normal.  No murmurs appreciated.  Respiratory: Normal respiratory effort.  Clear to auscultation bilaterally.  Abdomen: Non-distended, soft    Assessment/Plan:     Magdi was seen today for follow-up.    Diagnoses and all orders for this visit:    Pneumonia of left lower lobe due to infectious organism  Acute, complicated illness.  Seems to have resolved or is improving.  Discussed that her cough may last for several more weeks.  Continue with current inhalers.  Discussed signs and symptoms to watch for, reasons to the emergency department.    Chronic obstructive pulmonary disease, unspecified COPD type (HCC)  Chronic, stable, continue current inhalers    Benign essential tremor  Chronic, stable.  We discussed pros and cons of the propranolol.  Recommended trying it for a couple days when she is feeling back to baseline, stopping if she develops any side effects.        Return in about 3 months (around 12/3/2021) for routine follow up.    " Detail Level: Detailed Quality 130: Documentation Of Current Medications In The Medical Record: Current Medications Documented Quality 110: Preventive Care And Screening: Influenza Immunization: Influenza Immunization Administered during Influenza season

## 2023-03-29 ENCOUNTER — OFFICE VISIT (OUTPATIENT)
Dept: MEDICAL GROUP | Facility: MEDICAL CENTER | Age: 83
End: 2023-03-29
Payer: MEDICARE

## 2023-03-29 ENCOUNTER — HOSPITAL ENCOUNTER (OUTPATIENT)
Dept: LAB | Facility: MEDICAL CENTER | Age: 83
End: 2023-03-29
Attending: STUDENT IN AN ORGANIZED HEALTH CARE EDUCATION/TRAINING PROGRAM
Payer: MEDICARE

## 2023-03-29 VITALS
WEIGHT: 161 LBS | HEIGHT: 64 IN | DIASTOLIC BLOOD PRESSURE: 70 MMHG | SYSTOLIC BLOOD PRESSURE: 122 MMHG | OXYGEN SATURATION: 96 % | HEART RATE: 88 BPM | TEMPERATURE: 98.7 F | BODY MASS INDEX: 27.49 KG/M2

## 2023-03-29 DIAGNOSIS — N30.00 ACUTE CYSTITIS WITHOUT HEMATURIA: ICD-10-CM

## 2023-03-29 DIAGNOSIS — E87.6 HYPOKALEMIA: ICD-10-CM

## 2023-03-29 DIAGNOSIS — R30.0 DYSURIA: ICD-10-CM

## 2023-03-29 LAB
ALBUMIN SERPL BCP-MCNC: 4.2 G/DL (ref 3.2–4.9)
ALBUMIN/GLOB SERPL: 1.2 G/DL
ALP SERPL-CCNC: 71 U/L (ref 30–99)
ALT SERPL-CCNC: 108 U/L (ref 2–50)
ANION GAP SERPL CALC-SCNC: 16 MMOL/L (ref 7–16)
APPEARANCE UR: ABNORMAL
APPEARANCE UR: NORMAL
AST SERPL-CCNC: 74 U/L (ref 12–45)
BACTERIA #/AREA URNS HPF: NEGATIVE /HPF
BILIRUB SERPL-MCNC: 0.5 MG/DL (ref 0.1–1.5)
BILIRUB UR QL STRIP.AUTO: NEGATIVE
BILIRUB UR STRIP-MCNC: NORMAL MG/DL
BUN SERPL-MCNC: 16 MG/DL (ref 8–22)
CALCIUM ALBUM COR SERPL-MCNC: 9.7 MG/DL (ref 8.5–10.5)
CALCIUM SERPL-MCNC: 9.9 MG/DL (ref 8.5–10.5)
CAOX CRY #/AREA URNS HPF: ABNORMAL /HPF
CHLORIDE SERPL-SCNC: 102 MMOL/L (ref 96–112)
CO2 SERPL-SCNC: 25 MMOL/L (ref 20–33)
COLOR UR AUTO: NORMAL
COLOR UR: ABNORMAL
CREAT SERPL-MCNC: 0.62 MG/DL (ref 0.5–1.4)
EPI CELLS #/AREA URNS HPF: ABNORMAL /HPF
GFR SERPLBLD CREATININE-BSD FMLA CKD-EPI: 88 ML/MIN/1.73 M 2
GLOBULIN SER CALC-MCNC: 3.6 G/DL (ref 1.9–3.5)
GLUCOSE SERPL-MCNC: 83 MG/DL (ref 65–99)
GLUCOSE UR STRIP.AUTO-MCNC: NEGATIVE MG/DL
GLUCOSE UR STRIP.AUTO-MCNC: NEGATIVE MG/DL
HYALINE CASTS #/AREA URNS LPF: ABNORMAL /LPF
KETONES UR STRIP.AUTO-MCNC: NEGATIVE MG/DL
KETONES UR STRIP.AUTO-MCNC: NORMAL MG/DL
LEUKOCYTE ESTERASE UR QL STRIP.AUTO: ABNORMAL
LEUKOCYTE ESTERASE UR QL STRIP.AUTO: NORMAL
MICRO URNS: ABNORMAL
NITRITE UR QL STRIP.AUTO: POSITIVE
NITRITE UR QL STRIP.AUTO: POSITIVE
PH UR STRIP.AUTO: 6 [PH] (ref 5–8)
PH UR STRIP.AUTO: 6 [PH] (ref 5–8)
POTASSIUM SERPL-SCNC: 3.3 MMOL/L (ref 3.6–5.5)
PROT SERPL-MCNC: 7.8 G/DL (ref 6–8.2)
PROT UR QL STRIP: 30 MG/DL
PROT UR QL STRIP: 30 MG/DL
RBC # URNS HPF: ABNORMAL /HPF
RBC UR QL AUTO: NEGATIVE
RBC UR QL AUTO: NORMAL
SODIUM SERPL-SCNC: 143 MMOL/L (ref 135–145)
SP GR UR STRIP.AUTO: 1.02
SP GR UR STRIP.AUTO: 1.02
UROBILINOGEN UR STRIP-MCNC: 1 MG/DL
UROBILINOGEN UR STRIP.AUTO-MCNC: 0.2 MG/DL
WBC #/AREA URNS HPF: ABNORMAL /HPF

## 2023-03-29 PROCEDURE — 36415 COLL VENOUS BLD VENIPUNCTURE: CPT

## 2023-03-29 PROCEDURE — 87086 URINE CULTURE/COLONY COUNT: CPT

## 2023-03-29 PROCEDURE — 87186 SC STD MICRODIL/AGAR DIL: CPT

## 2023-03-29 PROCEDURE — 81002 URINALYSIS NONAUTO W/O SCOPE: CPT | Performed by: STUDENT IN AN ORGANIZED HEALTH CARE EDUCATION/TRAINING PROGRAM

## 2023-03-29 PROCEDURE — 81001 URINALYSIS AUTO W/SCOPE: CPT

## 2023-03-29 PROCEDURE — 99213 OFFICE O/P EST LOW 20 MIN: CPT | Performed by: STUDENT IN AN ORGANIZED HEALTH CARE EDUCATION/TRAINING PROGRAM

## 2023-03-29 PROCEDURE — 80053 COMPREHEN METABOLIC PANEL: CPT

## 2023-03-29 PROCEDURE — 87077 CULTURE AEROBIC IDENTIFY: CPT

## 2023-03-29 RX ORDER — CEFDINIR 300 MG/1
300 CAPSULE ORAL 2 TIMES DAILY
Qty: 10 CAPSULE | Refills: 0 | Status: SHIPPED | OUTPATIENT
Start: 2023-03-29 | End: 2023-04-03

## 2023-03-29 RX ORDER — PHENAZOPYRIDINE HYDROCHLORIDE 200 MG/1
200 TABLET, FILM COATED ORAL 3 TIMES DAILY PRN
Qty: 6 TABLET | Refills: 0 | Status: SHIPPED | OUTPATIENT
Start: 2023-03-29 | End: 2023-03-31

## 2023-03-29 ASSESSMENT — FIBROSIS 4 INDEX: FIB4 SCORE: 1.41

## 2023-03-29 NOTE — PROGRESS NOTES
"Chief Complaint   Patient presents with    Painful Urination    Urinary Retention    Loose Stools       HPI:  - Patient reports for the past 3 week she has been experiencing urinary frequency, urge. She also reports she has been have 1-3 episodes of bowel movement.   - She reports during evening she does wear diaper, she reports she normally pass some urine during the night.    - she reports change of bowel movement. Typical has BM daily, in past few day she has had a second bowel movement that is smaller, looser.     Symptom onset:  21 days ago   Current symptoms: Urgent, frequent voids. No blood noted in urine.  Since onset symptoms are: Unchanged  Treatments tried: NONE  Associated symptoms: Negative for fever, flank pain, nausea and vomiting, vaginal discharge, pelvic pain.  History is negative for frequent UTI.     ROS:  Denies fever, chills, vomiting or abdominal pain.     OBJECTIVE:  /70 (BP Location: Right arm, Patient Position: Sitting, BP Cuff Size: Adult)   Pulse 88   Temp 37.1 °C (98.7 °F) (Temporal)   Ht 1.626 m (5' 4\")   Wt 73 kg (161 lb)   SpO2 96%   Gen: Alert, NAD.  Chest: Lungs clear to auscultation, CV RRR.  Abdomen: Soft, tender in suprapubic region. No CVAT. Normal bowel sounds.     Lab Results   Component Value Date    POCCOLOR dark yellow 05/13/2019    POCAPPEAR slight cloudy 05/13/2019    POCLEUKEST small 05/13/2019    POCNITRITE neg 05/13/2019    POCUROBILIGE 0.2 05/13/2019    POCPROTEIN 30 05/13/2019    POCURPH 5.0 05/13/2019    POCBLOOD trace 05/13/2019    POCSPGRV 1.025 05/13/2019    POCKETONES trace 05/13/2019    POCBILIRUBIN small 05/13/2019    POCGLUCUA neg 05/13/2019          ASSESSMENT/PLAN:     1. Dysuria    2. Acute cystitis without hematuria    3. Hypokalemia    - Incidental R nephrolithiasis 5mm on last ct scan 1/2023  - signs and symptoms concerning for cystitis, with POC UA with positive Nitrite and low LE and Blood. Inadequate sample for send out. Lab ordered CMP " and UA CX if indicated.      1. Abnormal urine dipstick in office. Urine sent for culture. Start antibiotics cefdinir 300mg BID x 5 days.   2. Provided education to drink plenty of fluids, wipe front to back every void and bowel movement.   3. Return to clinic if symptoms not improving within 3-4 days or in case of vomiting, fever, increasing pain.

## 2023-04-05 ENCOUNTER — OFFICE VISIT (OUTPATIENT)
Dept: MEDICAL GROUP | Facility: MEDICAL CENTER | Age: 83
End: 2023-04-05
Payer: MEDICARE

## 2023-04-05 VITALS
BODY MASS INDEX: 26.8 KG/M2 | OXYGEN SATURATION: 94 % | SYSTOLIC BLOOD PRESSURE: 124 MMHG | HEART RATE: 74 BPM | WEIGHT: 157 LBS | RESPIRATION RATE: 16 BRPM | DIASTOLIC BLOOD PRESSURE: 70 MMHG | TEMPERATURE: 97.6 F | HEIGHT: 64 IN

## 2023-04-05 DIAGNOSIS — N30.01 ACUTE CYSTITIS WITH HEMATURIA: ICD-10-CM

## 2023-04-05 PROCEDURE — 99212 OFFICE O/P EST SF 10 MIN: CPT | Performed by: FAMILY MEDICINE

## 2023-04-05 ASSESSMENT — FIBROSIS 4 INDEX: FIB4 SCORE: 1.45

## 2023-04-05 NOTE — PROGRESS NOTES
"  Subjective:     Magdi Pittman is a 82 y.o. female presenting for a follow-up.  She completed a course of antibiotics for recent UTI.  Her last dose was 2 days ago.  Yesterday, she noted some dysuria, but none since.  She overall feels quite well.  Denies any fevers, nausea.        Current Outpatient Medications:     furosemide (LASIX) 20 MG Tab, TAKE 1 TABLET BY MOUTH 1 TIME A DAY AS NEEDED (LEG SWELLING)., Disp: 90 Tablet, Rfl: 3    potassium chloride SA (KDUR) 20 MEQ Tab CR, Take 1 Tablet by mouth every day., Disp: 90 Tablet, Rfl: 3    triamterene-hctz (MAXZIDE-25/DYAZIDE) 37.5-25 MG Tab, Take 1 Tablet by mouth every day., Disp: 90 Tablet, Rfl: 3    albuterol (PROAIR HFA) 108 (90 Base) MCG/ACT Aero Soln inhalation aerosol, Inhale 1-2 Puffs every 6 hours as needed for Shortness of Breath., Disp: 1 Each, Rfl: 3    thyroid (ARMOUR THYROID) 60 MG Tab, Take 1 Tablet by mouth every day., Disp: 90 Tablet, Rfl: 3    albuterol (PROVENTIL) 2.5mg/3ml Nebu Soln solution for nebulization, Take 3 mL by nebulization every four hours as needed for Shortness of Breath., Disp: 100 mL, Rfl: 3    Cholecalciferol (VITAMIND D3) 1000 UNIT Tab, Take 1,000 Units by mouth every day., Disp: , Rfl:     Misc. Devices Misc, 2L oxygen via nasal cannula at night while sleeping, Disp: 1 Units, Rfl: 0    Acetaminophen (TYLENOL ARTHRITIS PAIN PO), Take 650 mg by mouth 2 Times a Day., Disp: , Rfl:     Cyanocobalamin (VITAMIN B-12 PO), Take  by mouth every day., Disp: , Rfl:     Multiple Vitamin (MULTIVITAMIN PO), Take 1 Tab by mouth every day., Disp: , Rfl:     Objective:     Vitals: /70   Pulse 74   Temp 36.4 °C (97.6 °F)   Resp 16   Ht 1.626 m (5' 4\")   Wt 71.2 kg (157 lb)   SpO2 94%   BMI 26.95 kg/m²   General: Alert  HEENT: Normocephalic.     Assessment/Plan:     Diagnoses and all orders for this visit:    Acute cystitis with hematuria  Self-limited issue.  Previous providers notes reviewed, lab results reviewed.  We discussed " staying hydrated, monitoring for now.        Return in about 3 months (around 7/5/2023) for routine follow up.

## 2023-04-13 RX ORDER — THYROID 60 MG/1
60 TABLET ORAL
Qty: 90 TABLET | Refills: 3 | Status: SHIPPED | OUTPATIENT
Start: 2023-04-13 | End: 2023-09-12 | Stop reason: SDUPTHER

## 2023-07-14 ENCOUNTER — APPOINTMENT (OUTPATIENT)
Dept: MEDICAL GROUP | Facility: MEDICAL CENTER | Age: 83
End: 2023-07-14
Payer: MEDICARE

## 2023-08-08 ENCOUNTER — APPOINTMENT (OUTPATIENT)
Dept: MEDICAL GROUP | Facility: MEDICAL CENTER | Age: 83
End: 2023-08-08
Payer: MEDICARE

## 2023-08-30 ENCOUNTER — TELEPHONE (OUTPATIENT)
Dept: HEALTH INFORMATION MANAGEMENT | Facility: OTHER | Age: 83
End: 2023-08-30
Payer: MEDICARE

## 2023-09-12 ENCOUNTER — OFFICE VISIT (OUTPATIENT)
Dept: MEDICAL GROUP | Facility: MEDICAL CENTER | Age: 83
End: 2023-09-12
Payer: MEDICARE

## 2023-09-12 VITALS
OXYGEN SATURATION: 96 % | DIASTOLIC BLOOD PRESSURE: 72 MMHG | WEIGHT: 158 LBS | RESPIRATION RATE: 16 BRPM | TEMPERATURE: 97.6 F | HEIGHT: 64 IN | SYSTOLIC BLOOD PRESSURE: 120 MMHG | BODY MASS INDEX: 26.98 KG/M2 | HEART RATE: 64 BPM

## 2023-09-12 DIAGNOSIS — E03.4 HYPOTHYROIDISM DUE TO ACQUIRED ATROPHY OF THYROID: ICD-10-CM

## 2023-09-12 DIAGNOSIS — J44.9 CHRONIC OBSTRUCTIVE PULMONARY DISEASE, UNSPECIFIED COPD TYPE (HCC): ICD-10-CM

## 2023-09-12 DIAGNOSIS — F33.1 MODERATE EPISODE OF RECURRENT MAJOR DEPRESSIVE DISORDER (HCC): ICD-10-CM

## 2023-09-12 DIAGNOSIS — M79.89 LEG SWELLING: ICD-10-CM

## 2023-09-12 DIAGNOSIS — E87.6 HYPOKALEMIA: ICD-10-CM

## 2023-09-12 PROCEDURE — 3074F SYST BP LT 130 MM HG: CPT | Performed by: FAMILY MEDICINE

## 2023-09-12 PROCEDURE — 99214 OFFICE O/P EST MOD 30 MIN: CPT | Performed by: FAMILY MEDICINE

## 2023-09-12 PROCEDURE — 3078F DIAST BP <80 MM HG: CPT | Performed by: FAMILY MEDICINE

## 2023-09-12 RX ORDER — POTASSIUM CHLORIDE 20 MEQ/1
20 TABLET, EXTENDED RELEASE ORAL DAILY
Qty: 90 TABLET | Refills: 3 | Status: SHIPPED | OUTPATIENT
Start: 2023-09-12

## 2023-09-12 RX ORDER — ALBUTEROL SULFATE 90 UG/1
1-2 AEROSOL, METERED RESPIRATORY (INHALATION) EVERY 6 HOURS PRN
Qty: 1 EACH | Refills: 3 | Status: SHIPPED | OUTPATIENT
Start: 2023-09-12

## 2023-09-12 RX ORDER — TRIAMTERENE AND HYDROCHLOROTHIAZIDE 37.5; 25 MG/1; MG/1
1 TABLET ORAL
Qty: 90 TABLET | Refills: 3 | Status: SHIPPED | OUTPATIENT
Start: 2023-09-12

## 2023-09-12 RX ORDER — VORTIOXETINE 5 MG/1
5 TABLET, FILM COATED ORAL DAILY
Qty: 90 TABLET | Refills: 1 | Status: SHIPPED | OUTPATIENT
Start: 2023-09-12

## 2023-09-12 RX ORDER — FUROSEMIDE 20 MG/1
20 TABLET ORAL
Qty: 90 TABLET | Refills: 3 | Status: SHIPPED | OUTPATIENT
Start: 2023-09-12

## 2023-09-12 RX ORDER — TIOTROPIUM BROMIDE INHALATION SPRAY 3.12 UG/1
5 SPRAY, METERED RESPIRATORY (INHALATION) DAILY
Qty: 4 G | Refills: 6 | Status: SHIPPED | OUTPATIENT
Start: 2023-09-12

## 2023-09-12 RX ORDER — THYROID 60 MG/1
60 TABLET ORAL
Qty: 90 TABLET | Refills: 3 | Status: SHIPPED | OUTPATIENT
Start: 2023-09-12

## 2023-09-12 ASSESSMENT — FIBROSIS 4 INDEX: FIB4 SCORE: 1.45

## 2023-09-12 NOTE — PROGRESS NOTES
"  Subjective:     Magdi Pittman is a 82 y.o. female presenting for a follow up          Current Outpatient Medications:     potassium chloride SA (KDUR) 20 MEQ Tab CR, Take 1 Tablet by mouth every day., Disp: 90 Tablet, Rfl: 3    thyroid (ARMOUR THYROID) 60 MG Tab, Take 1 Tablet by mouth every day., Disp: 90 Tablet, Rfl: 3    triamterene-hctz (MAXZIDE-25/DYAZIDE) 37.5-25 MG Tab, Take 1 Tablet by mouth every day., Disp: 90 Tablet, Rfl: 3    furosemide (LASIX) 20 MG Tab, Take 1 Tablet by mouth 1 time a day as needed (leg swelling)., Disp: 90 Tablet, Rfl: 3    albuterol (PROAIR HFA) 108 (90 Base) MCG/ACT Aero Soln inhalation aerosol, Inhale 1-2 Puffs every 6 hours as needed for Shortness of Breath., Disp: 1 Each, Rfl: 3    tiotropium (SPIRIVA RESPIMAT) 2.5 mcg/Act Aero Soln, Inhale 2 Inhalations every day., Disp: 4 g, Rfl: 6    Vortioxetine HBr (TRINTELLIX) 5 MG Tab, Take 5 mg by mouth every day., Disp: 90 Tablet, Rfl: 1    albuterol (PROVENTIL) 2.5mg/3ml Nebu Soln solution for nebulization, Take 3 mL by nebulization every four hours as needed for Shortness of Breath., Disp: 100 mL, Rfl: 3    Cholecalciferol (VITAMIND D3) 1000 UNIT Tab, Take 1,000 Units by mouth every day., Disp: , Rfl:     Misc. Devices Misc, 2L oxygen via nasal cannula at night while sleeping, Disp: 1 Units, Rfl: 0    Acetaminophen (TYLENOL ARTHRITIS PAIN PO), Take 650 mg by mouth 2 Times a Day., Disp: , Rfl:     Cyanocobalamin (VITAMIN B-12 PO), Take  by mouth every day., Disp: , Rfl:     Multiple Vitamin (MULTIVITAMIN PO), Take 1 Tab by mouth every day., Disp: , Rfl:     Objective:     Vitals: /72   Pulse 64   Temp 36.4 °C (97.6 °F)   Resp 16   Ht 1.626 m (5' 4\")   Wt 71.7 kg (158 lb)   SpO2 96%   BMI 27.12 kg/m²   General: Alert  HEENT: Normocephalic.     Assessment/Plan:     Diagnoses and all orders for this visit:    Hypothyroidism due to acquired atrophy of thyroid  Chronic, stable, labs are up-to-date.  Continue with [place] " Thyroid.  -     thyroid (ARMOUR THYROID) 60 MG Tab; Take 1 Tablet by mouth every day.    Leg swelling  Chronic, stable, continue triamterene-hydrochlorothiazide.  She takes furosemide occasionally.  -     triamterene-hctz (MAXZIDE-25/DYAZIDE) 37.5-25 MG Tab; Take 1 Tablet by mouth every day.  -     furosemide (LASIX) 20 MG Tab; Take 1 Tablet by mouth 1 time a day as needed (leg swelling).    Hypokalemia  Chronic, stable, recommended that she continue with the potassium, she has been taking it periodically.  Last labs continue to show hypokalemia  -     potassium chloride SA (KDUR) 20 MEQ Tab CR; Take 1 Tablet by mouth every day.    Chronic obstructive pulmonary disease, unspecified COPD type (HCC)  Chronic, stable, continue Spiriva  -     albuterol (PROAIR HFA) 108 (90 Base) MCG/ACT Aero Soln inhalation aerosol; Inhale 1-2 Puffs every 6 hours as needed for Shortness of Breath.  -     tiotropium (SPIRIVA RESPIMAT) 2.5 mcg/Act Aero Soln; Inhale 2 Inhalations every day.    Moderate episode of recurrent major depressive disorder (HCC)  Chronic, uncontrolled.  She has tried citalopram, escitalopram, sertraline, venlafaxine in the past.  She was interested in trying Trintellix.  -     Vortioxetine HBr (TRINTELLIX) 5 MG Tab; Take 5 mg by mouth every day.

## 2023-11-09 ENCOUNTER — TELEPHONE (OUTPATIENT)
Dept: HEALTH INFORMATION MANAGEMENT | Facility: OTHER | Age: 83
End: 2023-11-09
Payer: MEDICARE

## 2024-07-27 ENCOUNTER — HOSPITAL ENCOUNTER (OUTPATIENT)
Facility: MEDICAL CENTER | Age: 84
End: 2024-07-28
Attending: EMERGENCY MEDICINE | Admitting: HOSPITALIST
Payer: MEDICARE

## 2024-07-27 ENCOUNTER — APPOINTMENT (OUTPATIENT)
Dept: RADIOLOGY | Facility: MEDICAL CENTER | Age: 84
End: 2024-07-27
Attending: EMERGENCY MEDICINE
Payer: MEDICARE

## 2024-07-27 DIAGNOSIS — R07.9 CHEST PAIN, UNSPECIFIED TYPE: ICD-10-CM

## 2024-07-27 DIAGNOSIS — R00.2 PALPITATIONS: ICD-10-CM

## 2024-07-27 PROBLEM — J81.0 ACUTE PULMONARY EDEMA (HCC): Status: ACTIVE | Noted: 2024-07-27

## 2024-07-27 LAB
ALBUMIN SERPL BCP-MCNC: 4.1 G/DL (ref 3.2–4.9)
ALBUMIN/GLOB SERPL: 1.2 G/DL
ALP SERPL-CCNC: 101 U/L (ref 30–99)
ALT SERPL-CCNC: 89 U/L (ref 2–50)
ANION GAP SERPL CALC-SCNC: 15 MMOL/L (ref 7–16)
AST SERPL-CCNC: 65 U/L (ref 12–45)
BASOPHILS # BLD AUTO: 1.1 % (ref 0–1.8)
BASOPHILS # BLD: 0.09 K/UL (ref 0–0.12)
BILIRUB SERPL-MCNC: 0.4 MG/DL (ref 0.1–1.5)
BUN SERPL-MCNC: 28 MG/DL (ref 8–22)
CALCIUM ALBUM COR SERPL-MCNC: 10.1 MG/DL (ref 8.5–10.5)
CALCIUM SERPL-MCNC: 10.2 MG/DL (ref 8.5–10.5)
CHLORIDE SERPL-SCNC: 97 MMOL/L (ref 96–112)
CO2 SERPL-SCNC: 27 MMOL/L (ref 20–33)
CREAT SERPL-MCNC: 0.91 MG/DL (ref 0.5–1.4)
EKG IMPRESSION: NORMAL
EOSINOPHIL # BLD AUTO: 0.38 K/UL (ref 0–0.51)
EOSINOPHIL NFR BLD: 4.8 % (ref 0–6.9)
ERYTHROCYTE [DISTWIDTH] IN BLOOD BY AUTOMATED COUNT: 44.5 FL (ref 35.9–50)
GFR SERPLBLD CREATININE-BSD FMLA CKD-EPI: 62 ML/MIN/1.73 M 2
GLOBULIN SER CALC-MCNC: 3.4 G/DL (ref 1.9–3.5)
GLUCOSE SERPL-MCNC: 129 MG/DL (ref 65–99)
HCT VFR BLD AUTO: 45.1 % (ref 37–47)
HGB BLD-MCNC: 15.4 G/DL (ref 12–16)
IMM GRANULOCYTES # BLD AUTO: 0.02 K/UL (ref 0–0.11)
IMM GRANULOCYTES NFR BLD AUTO: 0.3 % (ref 0–0.9)
LYMPHOCYTES # BLD AUTO: 3.09 K/UL (ref 1–4.8)
LYMPHOCYTES NFR BLD: 39.4 % (ref 22–41)
MCH RBC QN AUTO: 33.3 PG (ref 27–33)
MCHC RBC AUTO-ENTMCNC: 34.1 G/DL (ref 32.2–35.5)
MCV RBC AUTO: 97.4 FL (ref 81.4–97.8)
MONOCYTES # BLD AUTO: 1.09 K/UL (ref 0–0.85)
MONOCYTES NFR BLD AUTO: 13.9 % (ref 0–13.4)
NEUTROPHILS # BLD AUTO: 3.17 K/UL (ref 1.82–7.42)
NEUTROPHILS NFR BLD: 40.5 % (ref 44–72)
NRBC # BLD AUTO: 0 K/UL
NRBC BLD-RTO: 0 /100 WBC (ref 0–0.2)
PLATELET # BLD AUTO: 429 K/UL (ref 164–446)
PMV BLD AUTO: 9.1 FL (ref 9–12.9)
POTASSIUM SERPL-SCNC: 3.2 MMOL/L (ref 3.6–5.5)
PROT SERPL-MCNC: 7.5 G/DL (ref 6–8.2)
RBC # BLD AUTO: 4.63 M/UL (ref 4.2–5.4)
SODIUM SERPL-SCNC: 139 MMOL/L (ref 135–145)
T3FREE SERPL-MCNC: 3.45 PG/ML (ref 2–4.4)
T4 FREE SERPL-MCNC: 0.83 NG/DL (ref 0.93–1.7)
T4 SERPL-MCNC: 5.3 UG/DL (ref 4–12)
THYROPEROXIDASE AB SERPL-ACNC: 16 IU/ML (ref 0–9)
TROPONIN T SERPL-MCNC: 9 NG/L (ref 6–19)
TSH SERPL DL<=0.005 MIU/L-ACNC: 0.15 UIU/ML (ref 0.38–5.33)
WBC # BLD AUTO: 7.8 K/UL (ref 4.8–10.8)

## 2024-07-27 PROCEDURE — 86376 MICROSOMAL ANTIBODY EACH: CPT

## 2024-07-27 PROCEDURE — 99223 1ST HOSP IP/OBS HIGH 75: CPT | Performed by: HOSPITALIST

## 2024-07-27 PROCEDURE — 99285 EMERGENCY DEPT VISIT HI MDM: CPT

## 2024-07-27 PROCEDURE — G0378 HOSPITAL OBSERVATION PER HR: HCPCS

## 2024-07-27 PROCEDURE — 80053 COMPREHEN METABOLIC PANEL: CPT

## 2024-07-27 PROCEDURE — 84481 FREE ASSAY (FT-3): CPT

## 2024-07-27 PROCEDURE — 36415 COLL VENOUS BLD VENIPUNCTURE: CPT

## 2024-07-27 PROCEDURE — 84443 ASSAY THYROID STIM HORMONE: CPT

## 2024-07-27 PROCEDURE — 85025 COMPLETE CBC W/AUTO DIFF WBC: CPT

## 2024-07-27 PROCEDURE — 84436 ASSAY OF TOTAL THYROXINE: CPT

## 2024-07-27 PROCEDURE — 71045 X-RAY EXAM CHEST 1 VIEW: CPT

## 2024-07-27 PROCEDURE — 84484 ASSAY OF TROPONIN QUANT: CPT

## 2024-07-27 PROCEDURE — 93005 ELECTROCARDIOGRAM TRACING: CPT | Performed by: EMERGENCY MEDICINE

## 2024-07-27 PROCEDURE — 84439 ASSAY OF FREE THYROXINE: CPT

## 2024-07-27 PROCEDURE — 93005 ELECTROCARDIOGRAM TRACING: CPT

## 2024-07-27 RX ORDER — HYDRALAZINE HYDROCHLORIDE 20 MG/ML
10 INJECTION INTRAMUSCULAR; INTRAVENOUS EVERY 4 HOURS PRN
Status: DISCONTINUED | OUTPATIENT
Start: 2024-07-27 | End: 2024-07-28 | Stop reason: HOSPADM

## 2024-07-27 RX ORDER — POTASSIUM CHLORIDE 1500 MG/1
20 TABLET, EXTENDED RELEASE ORAL
COMMUNITY

## 2024-07-27 RX ORDER — ACETAMINOPHEN 325 MG/1
650 TABLET ORAL EVERY 6 HOURS PRN
Status: DISCONTINUED | OUTPATIENT
Start: 2024-07-27 | End: 2024-07-28 | Stop reason: HOSPADM

## 2024-07-27 RX ORDER — AMINOPHYLLINE 25 MG/ML
100 INJECTION, SOLUTION INTRAVENOUS
Status: DISCONTINUED | OUTPATIENT
Start: 2024-07-27 | End: 2024-07-28 | Stop reason: HOSPADM

## 2024-07-27 RX ORDER — NITROGLYCERIN 0.4 MG/1
0.4 TABLET SUBLINGUAL
Status: DISCONTINUED | OUTPATIENT
Start: 2024-07-27 | End: 2024-07-28 | Stop reason: HOSPADM

## 2024-07-27 RX ORDER — TRIAMTERENE/HYDROCHLOROTHIAZID 37.5-25 MG
1 TABLET ORAL DAILY
Status: DISCONTINUED | OUTPATIENT
Start: 2024-07-28 | End: 2024-07-28 | Stop reason: HOSPADM

## 2024-07-27 RX ORDER — SENNOSIDES 8.6 MG
650 CAPSULE ORAL 2 TIMES DAILY PRN
COMMUNITY

## 2024-07-27 RX ORDER — THYROID 30 MG/1
30 TABLET ORAL EVERY MORNING
Status: DISCONTINUED | OUTPATIENT
Start: 2024-07-28 | End: 2024-07-27

## 2024-07-27 RX ORDER — B-COMPLEX WITH VITAMIN C
1 TABLET ORAL DAILY
COMMUNITY

## 2024-07-27 RX ORDER — TRIAMTERENE AND HYDROCHLOROTHIAZIDE 37.5; 25 MG/1; MG/1
1 CAPSULE ORAL DAILY
Status: ON HOLD | COMMUNITY
End: 2024-07-28

## 2024-07-27 RX ORDER — ASPIRIN 81 MG/1
81 TABLET ORAL DAILY
Status: DISCONTINUED | OUTPATIENT
Start: 2024-07-27 | End: 2024-07-28 | Stop reason: HOSPADM

## 2024-07-27 RX ORDER — ONDANSETRON 4 MG/1
4 TABLET, ORALLY DISINTEGRATING ORAL EVERY 4 HOURS PRN
Status: DISCONTINUED | OUTPATIENT
Start: 2024-07-27 | End: 2024-07-28 | Stop reason: HOSPADM

## 2024-07-27 RX ORDER — REGADENOSON 0.08 MG/ML
0.4 INJECTION, SOLUTION INTRAVENOUS
Status: DISCONTINUED | OUTPATIENT
Start: 2024-07-27 | End: 2024-07-28 | Stop reason: HOSPADM

## 2024-07-27 RX ORDER — ONDANSETRON 2 MG/ML
4 INJECTION INTRAMUSCULAR; INTRAVENOUS EVERY 4 HOURS PRN
Status: DISCONTINUED | OUTPATIENT
Start: 2024-07-27 | End: 2024-07-28 | Stop reason: HOSPADM

## 2024-07-27 RX ORDER — THYROID 30 MG/1
60 TABLET ORAL EVERY MORNING
Status: DISCONTINUED | OUTPATIENT
Start: 2024-07-28 | End: 2024-07-28 | Stop reason: HOSPADM

## 2024-07-27 ASSESSMENT — ENCOUNTER SYMPTOMS
WHEEZING: 0
BACK PAIN: 0
PND: 0
PHOTOPHOBIA: 0
TINGLING: 0
DIAPHORESIS: 0
BLURRED VISION: 0
DIARRHEA: 0
POLYDIPSIA: 0
VOMITING: 0
HEADACHES: 0
BRUISES/BLEEDS EASILY: 0
STRIDOR: 0
SHORTNESS OF BREATH: 1
DIZZINESS: 0
MYALGIAS: 0
DEPRESSION: 0
CHILLS: 0
WEAKNESS: 0
FALLS: 0
EYE PAIN: 0
HEARTBURN: 0
COUGH: 1
HALLUCINATIONS: 0
FLANK PAIN: 0
SINUS PAIN: 0
PALPITATIONS: 1
ORTHOPNEA: 0
NECK PAIN: 0
SORE THROAT: 0
SPUTUM PRODUCTION: 0
CLAUDICATION: 0
ABDOMINAL PAIN: 0
HEMOPTYSIS: 0
TREMORS: 0
DOUBLE VISION: 0
FEVER: 0
CONSTIPATION: 0
BLOOD IN STOOL: 0
NAUSEA: 0

## 2024-07-27 ASSESSMENT — FIBROSIS 4 INDEX
FIB4 SCORE: 1.37
FIB4 SCORE: 1.47

## 2024-07-27 ASSESSMENT — LIFESTYLE VARIABLES: SUBSTANCE_ABUSE: 0

## 2024-07-27 ASSESSMENT — SOCIAL DETERMINANTS OF HEALTH (SDOH)
WITHIN THE LAST YEAR, HAVE YOU BEEN KICKED, HIT, SLAPPED, OR OTHERWISE PHYSICALLY HURT BY YOUR PARTNER OR EX-PARTNER?: NO
WITHIN THE LAST YEAR, HAVE YOU BEEN AFRAID OF YOUR PARTNER OR EX-PARTNER?: NO
WITHIN THE LAST YEAR, HAVE YOU BEEN HUMILIATED OR EMOTIONALLY ABUSED IN OTHER WAYS BY YOUR PARTNER OR EX-PARTNER?: NO
WITHIN THE LAST YEAR, HAVE TO BEEN RAPED OR FORCED TO HAVE ANY KIND OF SEXUAL ACTIVITY BY YOUR PARTNER OR EX-PARTNER?: NO

## 2024-07-27 ASSESSMENT — PAIN DESCRIPTION - PAIN TYPE
TYPE: ACUTE PAIN
TYPE: ACUTE PAIN

## 2024-07-28 ENCOUNTER — APPOINTMENT (OUTPATIENT)
Dept: CARDIOLOGY | Facility: MEDICAL CENTER | Age: 84
End: 2024-07-28
Attending: HOSPITALIST
Payer: MEDICARE

## 2024-07-28 ENCOUNTER — APPOINTMENT (OUTPATIENT)
Dept: RADIOLOGY | Facility: MEDICAL CENTER | Age: 84
End: 2024-07-28
Attending: HOSPITALIST
Payer: MEDICARE

## 2024-07-28 VITALS
OXYGEN SATURATION: 92 % | HEART RATE: 63 BPM | TEMPERATURE: 97.5 F | RESPIRATION RATE: 16 BRPM | BODY MASS INDEX: 28.09 KG/M2 | WEIGHT: 158.51 LBS | SYSTOLIC BLOOD PRESSURE: 163 MMHG | DIASTOLIC BLOOD PRESSURE: 77 MMHG | HEIGHT: 63 IN

## 2024-07-28 LAB
ALBUMIN SERPL BCP-MCNC: 3.4 G/DL (ref 3.2–4.9)
ALBUMIN/GLOB SERPL: 1.2 G/DL
ALP SERPL-CCNC: 63 U/L (ref 30–99)
ALT SERPL-CCNC: 72 U/L (ref 2–50)
ANION GAP SERPL CALC-SCNC: 12 MMOL/L (ref 7–16)
AST SERPL-CCNC: 54 U/L (ref 12–45)
BASOPHILS # BLD AUTO: 1.5 % (ref 0–1.8)
BASOPHILS # BLD: 0.08 K/UL (ref 0–0.12)
BILIRUB SERPL-MCNC: 0.8 MG/DL (ref 0.1–1.5)
BUN SERPL-MCNC: 25 MG/DL (ref 8–22)
CALCIUM ALBUM COR SERPL-MCNC: 9.8 MG/DL (ref 8.5–10.5)
CALCIUM SERPL-MCNC: 9.3 MG/DL (ref 8.5–10.5)
CHLORIDE SERPL-SCNC: 101 MMOL/L (ref 96–112)
CHOLEST SERPL-MCNC: 173 MG/DL (ref 100–199)
CO2 SERPL-SCNC: 27 MMOL/L (ref 20–33)
CREAT SERPL-MCNC: 0.62 MG/DL (ref 0.5–1.4)
EKG IMPRESSION: NORMAL
EOSINOPHIL # BLD AUTO: 0.4 K/UL (ref 0–0.51)
EOSINOPHIL NFR BLD: 7.3 % (ref 0–6.9)
ERYTHROCYTE [DISTWIDTH] IN BLOOD BY AUTOMATED COUNT: 43.6 FL (ref 35.9–50)
GFR SERPLBLD CREATININE-BSD FMLA CKD-EPI: 87 ML/MIN/1.73 M 2
GLOBULIN SER CALC-MCNC: 2.9 G/DL (ref 1.9–3.5)
GLUCOSE SERPL-MCNC: 94 MG/DL (ref 65–99)
HCT VFR BLD AUTO: 40.2 % (ref 37–47)
HDLC SERPL-MCNC: 86 MG/DL
HGB BLD-MCNC: 13.7 G/DL (ref 12–16)
IMM GRANULOCYTES # BLD AUTO: 0.02 K/UL (ref 0–0.11)
IMM GRANULOCYTES NFR BLD AUTO: 0.4 % (ref 0–0.9)
LDLC SERPL CALC-MCNC: 76 MG/DL
LV EJECT FRACT  99904: 55
LV EJECT FRACT MOD 2C 99903: 68.74
LV EJECT FRACT MOD 4C 99902: 60.22
LV EJECT FRACT MOD BP 99901: 64.3
LYMPHOCYTES # BLD AUTO: 1.89 K/UL (ref 1–4.8)
LYMPHOCYTES NFR BLD: 34.4 % (ref 22–41)
MCH RBC QN AUTO: 32.6 PG (ref 27–33)
MCHC RBC AUTO-ENTMCNC: 34.1 G/DL (ref 32.2–35.5)
MCV RBC AUTO: 95.7 FL (ref 81.4–97.8)
MONOCYTES # BLD AUTO: 0.94 K/UL (ref 0–0.85)
MONOCYTES NFR BLD AUTO: 17.1 % (ref 0–13.4)
NEUTROPHILS # BLD AUTO: 2.17 K/UL (ref 1.82–7.42)
NEUTROPHILS NFR BLD: 39.3 % (ref 44–72)
NRBC # BLD AUTO: 0 K/UL
NRBC BLD-RTO: 0 /100 WBC (ref 0–0.2)
PLATELET # BLD AUTO: 329 K/UL (ref 164–446)
PMV BLD AUTO: 9.1 FL (ref 9–12.9)
POTASSIUM SERPL-SCNC: 3 MMOL/L (ref 3.6–5.5)
PROT SERPL-MCNC: 6.3 G/DL (ref 6–8.2)
RBC # BLD AUTO: 4.2 M/UL (ref 4.2–5.4)
SODIUM SERPL-SCNC: 140 MMOL/L (ref 135–145)
TRIGL SERPL-MCNC: 56 MG/DL (ref 0–149)
TROPONIN T SERPL-MCNC: 14 NG/L (ref 6–19)
TROPONIN T SERPL-MCNC: 15 NG/L (ref 6–19)
WBC # BLD AUTO: 5.5 K/UL (ref 4.8–10.8)

## 2024-07-28 PROCEDURE — 93306 TTE W/DOPPLER COMPLETE: CPT | Mod: 26 | Performed by: INTERNAL MEDICINE

## 2024-07-28 PROCEDURE — G0378 HOSPITAL OBSERVATION PER HR: HCPCS

## 2024-07-28 PROCEDURE — 84484 ASSAY OF TROPONIN QUANT: CPT

## 2024-07-28 PROCEDURE — A9502 TC99M TETROFOSMIN: HCPCS

## 2024-07-28 PROCEDURE — 93010 ELECTROCARDIOGRAM REPORT: CPT | Performed by: STUDENT IN AN ORGANIZED HEALTH CARE EDUCATION/TRAINING PROGRAM

## 2024-07-28 PROCEDURE — 83520 IMMUNOASSAY QUANT NOS NONAB: CPT

## 2024-07-28 PROCEDURE — 99239 HOSP IP/OBS DSCHRG MGMT >30: CPT | Performed by: STUDENT IN AN ORGANIZED HEALTH CARE EDUCATION/TRAINING PROGRAM

## 2024-07-28 PROCEDURE — 97165 OT EVAL LOW COMPLEX 30 MIN: CPT

## 2024-07-28 PROCEDURE — 700102 HCHG RX REV CODE 250 W/ 637 OVERRIDE(OP): Performed by: HOSPITALIST

## 2024-07-28 PROCEDURE — A9270 NON-COVERED ITEM OR SERVICE: HCPCS | Performed by: HOSPITALIST

## 2024-07-28 PROCEDURE — 700111 HCHG RX REV CODE 636 W/ 250 OVERRIDE (IP)

## 2024-07-28 PROCEDURE — 700102 HCHG RX REV CODE 250 W/ 637 OVERRIDE(OP): Mod: JZ | Performed by: STUDENT IN AN ORGANIZED HEALTH CARE EDUCATION/TRAINING PROGRAM

## 2024-07-28 PROCEDURE — 93005 ELECTROCARDIOGRAM TRACING: CPT | Performed by: HOSPITALIST

## 2024-07-28 PROCEDURE — 85025 COMPLETE CBC W/AUTO DIFF WBC: CPT

## 2024-07-28 PROCEDURE — 80053 COMPREHEN METABOLIC PANEL: CPT

## 2024-07-28 PROCEDURE — A9270 NON-COVERED ITEM OR SERVICE: HCPCS | Mod: JZ | Performed by: STUDENT IN AN ORGANIZED HEALTH CARE EDUCATION/TRAINING PROGRAM

## 2024-07-28 PROCEDURE — 93306 TTE W/DOPPLER COMPLETE: CPT

## 2024-07-28 PROCEDURE — 36415 COLL VENOUS BLD VENIPUNCTURE: CPT

## 2024-07-28 PROCEDURE — 80061 LIPID PANEL: CPT

## 2024-07-28 RX ORDER — POTASSIUM CHLORIDE 1500 MG/1
40 TABLET, EXTENDED RELEASE ORAL ONCE
Status: COMPLETED | OUTPATIENT
Start: 2024-07-28 | End: 2024-07-28

## 2024-07-28 RX ORDER — THYROID 60 MG/1
60 TABLET ORAL EVERY MORNING
COMMUNITY

## 2024-07-28 RX ORDER — REGADENOSON 0.08 MG/ML
INJECTION, SOLUTION INTRAVENOUS
Status: COMPLETED
Start: 2024-07-28 | End: 2024-07-28

## 2024-07-28 RX ORDER — FUROSEMIDE 20 MG
20 TABLET ORAL
COMMUNITY

## 2024-07-28 RX ADMIN — THYROID 60 MG: 30 TABLET ORAL at 05:53

## 2024-07-28 RX ADMIN — TRIAMTERENE AND HYDROCHLOROTHIAZIDE 1 TABLET: 37.5; 25 TABLET ORAL at 08:52

## 2024-07-28 RX ADMIN — REGADENOSON 0.4 MG: 0.08 INJECTION, SOLUTION INTRAVENOUS at 11:13

## 2024-07-28 RX ADMIN — POTASSIUM CHLORIDE 40 MEQ: 1500 TABLET, EXTENDED RELEASE ORAL at 08:51

## 2024-07-28 RX ADMIN — ACETAMINOPHEN 650 MG: 325 TABLET ORAL at 00:27

## 2024-07-28 SDOH — ECONOMIC STABILITY: TRANSPORTATION INSECURITY
IN THE PAST 12 MONTHS, HAS LACK OF RELIABLE TRANSPORTATION KEPT YOU FROM MEDICAL APPOINTMENTS, MEETINGS, WORK OR FROM GETTING THINGS NEEDED FOR DAILY LIVING?: NO

## 2024-07-28 SDOH — ECONOMIC STABILITY: TRANSPORTATION INSECURITY
IN THE PAST 12 MONTHS, HAS THE LACK OF TRANSPORTATION KEPT YOU FROM MEDICAL APPOINTMENTS OR FROM GETTING MEDICATIONS?: NO

## 2024-07-28 ASSESSMENT — COGNITIVE AND FUNCTIONAL STATUS - GENERAL
DAILY ACTIVITIY SCORE: 23
SUGGESTED CMS G CODE MODIFIER DAILY ACTIVITY: CI
HELP NEEDED FOR BATHING: A LITTLE

## 2024-07-28 ASSESSMENT — PATIENT HEALTH QUESTIONNAIRE - PHQ9
6. FEELING BAD ABOUT YOURSELF - OR THAT YOU ARE A FAILURE OR HAVE LET YOURSELF OR YOUR FAMILY DOWN: SEVERAL DAYS
SUM OF ALL RESPONSES TO PHQ QUESTIONS 1-9: 11
1. LITTLE INTEREST OR PLEASURE IN DOING THINGS: MORE THAN HALF THE DAYS
3. TROUBLE FALLING OR STAYING ASLEEP OR SLEEPING TOO MUCH: NOT AT ALL
4. FEELING TIRED OR HAVING LITTLE ENERGY: MORE THAN HALF THE DAYS
8. MOVING OR SPEAKING SO SLOWLY THAT OTHER PEOPLE COULD HAVE NOTICED. OR THE OPPOSITE, BEING SO FIGETY OR RESTLESS THAT YOU HAVE BEEN MOVING AROUND A LOT MORE THAN USUAL: NOT AT ALL
9. THOUGHTS THAT YOU WOULD BE BETTER OFF DEAD, OR OF HURTING YOURSELF: NOT AT ALL
2. FEELING DOWN, DEPRESSED, IRRITABLE, OR HOPELESS: NEARLY EVERY DAY
5. POOR APPETITE OR OVEREATING: NEARLY EVERY DAY
SUM OF ALL RESPONSES TO PHQ9 QUESTIONS 1 AND 2: 5
7. TROUBLE CONCENTRATING ON THINGS, SUCH AS READING THE NEWSPAPER OR WATCHING TELEVISION: NOT AT ALL

## 2024-07-28 ASSESSMENT — SOCIAL DETERMINANTS OF HEALTH (SDOH)
IN THE PAST 12 MONTHS, HAS THE ELECTRIC, GAS, OIL, OR WATER COMPANY THREATENED TO SHUT OFF SERVICE IN YOUR HOME?: NO
WITHIN THE PAST 12 MONTHS, THE FOOD YOU BOUGHT JUST DIDN'T LAST AND YOU DIDN'T HAVE MONEY TO GET MORE: NEVER TRUE
WITHIN THE PAST 12 MONTHS, YOU WORRIED THAT YOUR FOOD WOULD RUN OUT BEFORE YOU GOT THE MONEY TO BUY MORE: NEVER TRUE

## 2024-07-28 ASSESSMENT — PAIN DESCRIPTION - PAIN TYPE
TYPE: ACUTE PAIN
TYPE: ACUTE PAIN

## 2024-07-28 ASSESSMENT — LIFESTYLE VARIABLES
HAVE YOU EVER FELT YOU SHOULD CUT DOWN ON YOUR DRINKING: YES
EVER HAD A DRINK FIRST THING IN THE MORNING TO STEADY YOUR NERVES TO GET RID OF A HANGOVER: NO
TOTAL SCORE: 3
DOES PATIENT WANT TO TALK TO SOMEONE ABOUT QUITTING: NO
EVER FELT BAD OR GUILTY ABOUT YOUR DRINKING: YES
AVERAGE NUMBER OF DAYS PER WEEK YOU HAVE A DRINK CONTAINING ALCOHOL: 6
ALCOHOL_USE: YES
ON A TYPICAL DAY WHEN YOU DRINK ALCOHOL HOW MANY DRINKS DO YOU HAVE: 2
CONSUMPTION TOTAL: POSITIVE
DOES PATIENT WANT TO STOP DRINKING: YES
TOTAL SCORE: 3
TOTAL SCORE: 3
HOW MANY TIMES IN THE PAST YEAR HAVE YOU HAD 5 OR MORE DRINKS IN A DAY: 0
HAVE PEOPLE ANNOYED YOU BY CRITICIZING YOUR DRINKING: YES

## 2024-07-28 ASSESSMENT — ACTIVITIES OF DAILY LIVING (ADL): TOILETING: INDEPENDENT

## 2024-07-28 NOTE — ED TRIAGE NOTES
"83 y.o. female Magdi Pittman    Chief Complaint   Patient presents with    Palpitations     X 1 hour     BIB EMS to [facility]   Picked up from home  EMS called by patient     Patient presented to ED with complaint of palpitation an hour ago subsided spontaneously by the time EMS reach on scene. As per patient she started experiencing palpitations an hour ago when she checked her HR on home pulse oximetry, it was fluctuating between 130-170/ minutes so she called 911. Patient endorses she has fever for a week. Patient is COPD and has base line cough denies any changes. Denies any dysuria, change in bowel movement. FSBG by EMS 130mg/dl    AAO X4 , Respirations even and unlabored on room air , afebrile at this time.     ED RN protocol initiated for Chest pain       Medications given en route:none     Ht 1.646 m (5' 4.8\")   Wt 71 kg (156 lb 8.4 oz)   BMI 26.21 kg/m²     Past Medical History:   Diagnosis Date    Allergic rhinitis     Arthritis     ASTHMA     Bronchitis     COPD (chronic obstructive pulmonary disease) (HCC)     Daytime sleepiness     Depression     H/O varicose vein ligation 3/26/2014    History of breast cancer 3/26/2014    Hyperthyroidism     Intention tremor 9/28/2016    Lumbar disc disease 3/26/2014    Migraines 3/26/2014    Obesity     Pneumonia     Pulmonary emphysema (HCC)     Pulmonary nodules 6/23/2017    Rheumatoid arthritis (HCC)     Rheumatoid factor positive 3/26/2014    -CCP, Negative hepatitis panel      Ringing in ears     Shortness of breath     Unspecified disorder of thyroid     Wears glasses        Past Surgical History:   Procedure Laterality Date    KNEE ARTHROSCOPY Right 2/26/2010    Procedure: WITH DEBRIDEMENT MEDIAL FEMORAL CONDYLE & PATELLA;  Surgeon: Gregorio Sloan M.D.;  Location: SURGERY [facility];  Service:     MENISCECTOMY Right 2/26/2010    Procedure: PARTIAL LATERAL  ;  Surgeon: Gregorio Sloan M.D.;  Location: SURGERY [facility];  Service:     BASAL CELL " EXCISION  2009    left leg    BREAST BIOPSY  1974    left breast    TUBAL LIGATION  1973    VEIN LIGATION  1966    bilateral legs    TONSILLECTOMY  1958    PB REMOVAL SYNOVIAL CYST,KNEE  1948    right knee

## 2024-07-28 NOTE — HOSPITAL COURSE
Magdi Pittman is a 83 y.o. female who presented 7/27/2024 with past medical history of hypothyroidism, hypertension, hypokalemia, COPD, depression who presents to the hospital for palpitations.  It is associated with chest pain does radiate into the left jaw and left arm.  She denies any lightheadedness, syncope, nausea, vomiting, diarrhea, shortness of breath.  Patient occasionally does have fevers which are resolving now.  She checked her heart monitor and noticed her heart rate was ranging between 165-170.  8 months ago patient thyroid medications was increased where she was was taken extra pill every 7 days.  Patient has never had any heart issues in the past.     Chest x-ray interpreted by me found mild pulm edema  EKG interpreted by me found sinus tachycardia with PVCs    Hospital course under my care: afebrile and vitals wnl. Exam at bedside was grossly unremarkable. Labs and imagings findings discussed with patient. Serial trop flat.     Low TSH, low free T4, anti-TPO + : hashimoto's?    Echo reviewed: normal LVEF, no wall motion abnormalities and no significant valvular diseases.     NST done was without reversible ischemia or jeopardized myocardium. At this point, ACS is unlikely. We discussed about possible alternate causes:

## 2024-07-28 NOTE — PROGRESS NOTES
Called patient's home pharmacy, [facility] on [address] ([phone]), and verified strengths of [place] Thyroid, furosemide, and Spiriva. She has not been dispensed Spiriva since January 2024.    Sarah Castaneda, Pharmacy Intern

## 2024-07-28 NOTE — PROGRESS NOTES
4 Eyes Skin Assessment Completed by OLIVIA Tiwari and OLIVIA Ibarra.    Head WDL  Ears WDL  Nose WDL  Mouth WDL  Neck WDL  Breast/Chest WDL  Shoulder Blades WDL  Spine WDL  (R) Arm/Elbow/Hand WDL  (L) Arm/Elbow/Hand WDL  Abdomen WDL  Groin WDL  Scrotum/Coccyx/Buttocks Redness and Blanching  (R) Leg WDL  (L) Leg WDL  (R) Heel/Foot/Toe Redness, Callous on heel  (L) Heel/Foot/Toe Redness, Callous on heel    Devices In Places Tele Box, Blood Pressure Cuff, and Pulse Ox    Interventions In Place Pillows and Barrier Cream    Possible Skin Injury No    Pictures Uploaded Into Epic N/A  Wound Consult Placed N/A  RN Wound Prevention Protocol Ordered No

## 2024-07-28 NOTE — PROGRESS NOTES
SR HR 73-79  Ectopy: PVC and Trig  0.22/0.07/0.40 per monitor rooms strip interpretation    Strip not available

## 2024-07-28 NOTE — H&P
Hospital Medicine History & Physical Note    Date of Service  7/27/2024    Primary Care Physician  Gilson Snell M.D.    Consultants      Specialist Names:     Code Status  Full Code    Chief Complaint  Chief Complaint   Patient presents with    Palpitations     X 1 hour         History of Presenting Illness  Magdi Pittman is a 83 y.o. female who presented 7/27/2024 with past medical history of hypothyroidism, hypertension, hypokalemia, COPD, depression who presents to the hospital for palpitations.  It is associated with chest pain does radiate into the left jaw and left arm.  She denies any lightheadedness, syncope, nausea, vomiting, diarrhea, shortness of breath.  Patient occasionally does have fevers which are resolving now.  She checked her heart monitor and noticed her heart rate was ranging between 165-170.  8 months ago patient thyroid medications was increased where she was was taken extra pill every 7 days.  Patient has never had any heart issues in the past.    Chest x-ray interpreted by me found mild pulm edema  EKG interpreted by me found sinus tachycardia with PVCs    I discussed the plan of care with patient.    Review of Systems  Review of Systems   Constitutional:  Negative for chills, diaphoresis, fever and malaise/fatigue.   HENT:  Negative for congestion, ear discharge, ear pain, hearing loss, nosebleeds, sinus pain, sore throat and tinnitus.    Eyes:  Negative for blurred vision, double vision, photophobia and pain.   Respiratory:  Positive for cough and shortness of breath. Negative for hemoptysis, sputum production, wheezing and stridor.    Cardiovascular:  Positive for chest pain and palpitations. Negative for orthopnea, claudication, leg swelling and PND.   Gastrointestinal:  Negative for abdominal pain, blood in stool, constipation, diarrhea, heartburn, melena, nausea and vomiting.   Genitourinary:  Negative for dysuria, flank pain, frequency, hematuria and urgency.   Musculoskeletal:   "Negative for back pain, falls, joint pain, myalgias and neck pain.   Skin:  Negative for itching and rash.   Neurological:  Negative for dizziness, tingling, tremors, weakness and headaches.   Endo/Heme/Allergies:  Negative for environmental allergies and polydipsia. Does not bruise/bleed easily.   Psychiatric/Behavioral:  Negative for depression, hallucinations, substance abuse and suicidal ideas.        Past Medical History   has a past medical history of Allergic rhinitis, Arthritis, ASTHMA, Bronchitis, COPD (chronic obstructive pulmonary disease) ([facility]), Daytime sleepiness, Depression, H/O varicose vein ligation (3/26/2014), History of breast cancer (3/26/2014), Hyperthyroidism, Intention tremor (9/28/2016), Lumbar disc disease (3/26/2014), Migraines (3/26/2014), Obesity, Pneumonia, Pulmonary emphysema ([facility]), Pulmonary nodules (6/23/2017), Rheumatoid arthritis ([facility]), Rheumatoid factor positive (3/26/2014), Ringing in ears, Shortness of breath, Unspecified disorder of thyroid, and Wears glasses.    Surgical History   has a past surgical history that includes pr removal synovial cyst,knee (1948); vein ligation (1966); tubal ligation (1973); breast biopsy (1974); tonsillectomy (1958); basal cell excision (2009); knee arthroscopy (Right, 2/26/2010); and meniscectomy (Right, 2/26/2010).     Family History  family history includes Cancer in her brother, maternal aunt, maternal grandmother, mother, and sister; Lung Disease in her father.   Family history reviewed with patient. There is no family history that is pertinent to the chief complaint.     Social History   reports that she has never smoked. She has never used smokeless tobacco. She reports current alcohol use of about 1.8 - 2.4 oz of alcohol per week. She reports that she does not use drugs.    Allergies  Allergies   Allergen Reactions    Sulfa Drugs Rash     Patient states that she felt extremely hot and had skin peeling, \"like a sunburn from inside out\"    " Asa [Aspirin] Unspecified     States she was told not to take because of history of asthma    Quinine Diarrhea and Vomiting             Medications  Prior to Admission Medications   Prescriptions Last Dose Informant Patient Reported? Taking?   ARMOUR THYROID PO 2024 at 0630 Patient Yes Yes   Sig: Take 1 Tablet by mouth every morning.   B Complex Vitamins (VITAMIN B COMPLEX) Tab 2024 at 1200 Patient Yes Yes   Sig: Take 1 Tablet by mouth every day.   Cholecalciferol (VITAMIND D3) 1000 UNIT Tab 2024 at 1200 Patient Yes Yes   Sig: Take 1,000 Units by mouth every day.   Furosemide (LASIX PO) 2024 at 1300 Patient Yes Yes   Sig: Take 1 Tablet by mouth every 48 hours.   Misc. Devices Misc N/A at N/A Patient No No   SiL oxygen via nasal cannula at night while sleeping   Tiotropium Bromide Monohydrate (SPIRIVA RESPIMAT INH) 2024 at AM Patient Yes Yes   Sig: Inhale 2 Puffs every day.   acetaminophen (TYLENOL 8 HOUR ARTHRITIS PAIN) 650 MG CR tablet 2024 at 1900 Patient Yes Yes   Sig: Take 650 mg by mouth 2 times a day as needed for Mild Pain.   albuterol (PROAIR HFA) 108 (90 Base) MCG/ACT Aero Soln inhalation aerosol PRN at PRN Patient No No   Sig: Inhale 1-2 Puffs every 6 hours as needed for Shortness of Breath.   multivitamin Tab 2024 at 1200 Patient Yes Yes   Sig: Take 1 Tablet by mouth every day.   potassium chloride SA (KDUR) 20 MEQ Tab CR 2024 at 0730 Patient Yes Yes   Sig: Take 20 mEq by mouth every 48 hours.   triamterene/hctz (MAXZIDE-25/DYAZIDE) 37.5-25 MG Cap 2024 at 0900 Patient Yes Yes   Sig: Take 1 Capsule by mouth every day.      Facility-Administered Medications: None       Physical Exam  Temp:  [37 °C (98.6 °F)] 37 °C (98.6 °F)  Pulse:  [] 97  Resp:  [18] 18  BP: (137-149)/(63-66) 137/63  SpO2:  [93 %-94 %] 93 %  Blood Pressure : 137/63   Temperature: 37 °C (98.6 °F)   Pulse: 97   Respiration: 18   Pulse Oximetry: 93 %       Physical Exam  Vitals and  nursing note reviewed.   Constitutional:       General: She is not in acute distress.     Appearance: Normal appearance. She is not ill-appearing, toxic-appearing or diaphoretic.   HENT:      Head: Normocephalic and atraumatic.      Nose: No congestion or rhinorrhea.      Mouth/Throat:      Pharynx: No oropharyngeal exudate or posterior oropharyngeal erythema.   Eyes:      General: No scleral icterus.  Neck:      Vascular: JVD present. No carotid bruit.   Cardiovascular:      Rate and Rhythm: Normal rate and regular rhythm.      Pulses: Normal pulses.      Heart sounds: Normal heart sounds. No murmur heard.     No friction rub. No gallop.   Pulmonary:      Effort: Pulmonary effort is normal. No respiratory distress.      Breath sounds: No stridor. Rales present. No wheezing or rhonchi.   Abdominal:      General: Abdomen is flat. There is no distension.      Palpations: There is no mass.      Tenderness: There is no abdominal tenderness. There is no left CVA tenderness, guarding or rebound.      Hernia: No hernia is present.   Musculoskeletal:         General: No swelling. Normal range of motion.      Cervical back: No rigidity. No muscular tenderness.      Right lower leg: Edema present.      Left lower leg: Edema present.   Lymphadenopathy:      Cervical: No cervical adenopathy.   Skin:     General: Skin is warm and dry.      Capillary Refill: Capillary refill takes more than 3 seconds.      Coloration: Skin is not jaundiced or pale.      Findings: No bruising or erythema.   Neurological:      Mental Status: She is alert.         Laboratory:  Recent Labs     07/27/24 2000   WBC 7.8   RBC 4.63   HEMOGLOBIN 15.4   HEMATOCRIT 45.1   MCV 97.4   MCH 33.3*   MCHC 34.1   RDW 44.5   PLATELETCT 429   MPV 9.1     Recent Labs     07/27/24 2000   SODIUM 139   POTASSIUM 3.2*   CHLORIDE 97   CO2 27   GLUCOSE 129*   BUN 28*   CREATININE 0.91   CALCIUM 10.2     Recent Labs     07/27/24 2000   ALTSGPT 89*   ASTSGOT 65*  "  ALKPHOSPHAT 101*   TBILIRUBIN 0.4   GLUCOSE 129*         No results for input(s): \"NTPROBNP\" in the last 72 hours.      Recent Labs     07/27/24 2000   TROPONINT 9       Imaging:  DX-CHEST-PORTABLE (1 VIEW)   Final Result         1.  No acute cardiopulmonary disease.      EC-ECHOCARDIOGRAM COMPLETE W/O CONT    (Results Pending)   NM-CARDIAC STRESS TEST    (Results Pending)       X-Ray:  I have personally reviewed the images and compared with prior images.  EKG:  I have personally reviewed the images and compared with prior images.    Assessment/Plan:  Justification for Admission Status  I anticipate this patient is appropriate for observation status at this time because palpitations    Patient will need a Telemetry bed on MEDICAL service .  The need is secondary to palpitations.    * Chest pain- (present on admission)  Assessment & Plan  Rule out ACS  Initial EKG and troponin negative for ischemia  Continuous cardiac monitoring with serial EKG and troponin  Nuclear medicine cardiac stress test in the morning if troponin remains negative  Patient has been given full dose of aspirin  Check lipid panel, and hemoglobin A1c  Nitro when necessary for chest pain      Acute pulmonary edema (HCC)  Assessment & Plan  Monitor on telemetry  Cardiac echo has been ordered  Continue oral Lasix she is not hypoxic  Strict ins and outs and daily weights    Palpitations  Assessment & Plan  Possibly induced by her thyroid medications  Monitoring on telemetry  Cardiac echo has been ordered    Moderate episode of recurrent major depressive disorder (HCC)- (present on admission)  Assessment & Plan  Continue home medications    Hypothyroid- (present on admission)  Assessment & Plan  It is possible that she is taking too much of her thyroid medication as it may need to be adjusted  I believe she was taking 60 mg a day and has to take an additional 30 mg        VTE prophylaxis: SCDs/TEDs  "

## 2024-07-28 NOTE — ASSESSMENT & PLAN NOTE
Possibly induced by her thyroid medications  Monitoring on telemetry  Cardiac echo has been ordered

## 2024-07-28 NOTE — CARE PLAN
Problem: Pain - Standard  Goal: Alleviation of pain or a reduction in pain to the patient’s comfort goal  Outcome: Progressing     Problem: Skin Integrity  Goal: Skin integrity is maintained or improved  Outcome: Progressing   The patient is Stable - Low risk of patient condition declining or worsening    Shift Goals  Clinical Goals: ST and Eco by 1300  Patient Goals: go home  Family Goals: Feel better    Progress made toward(s) clinical / shift goals:  ST and eco were done and awaiting for eco results at this time    Patient is not progressing towards the following goals:

## 2024-07-28 NOTE — ED PROVIDER NOTES
"ED Provider Note    CHIEF COMPLAINT  Chief Complaint   Patient presents with    Palpitations     X 1 hour         EXTERNAL RECORDS REVIEWED  Outpatient Notes [facility]    HPI/ROS  LIMITATION TO HISTORY   Select: : None  OUTSIDE HISTORIAN(S):  None    Magdi Pittman is a 83 y.o. female who presents here for evaluation of heart palpitations, left arm pain and left jaw pain.  The patient states that earlier tonight, while she was sitting at home watching TV, she began to have some heart palpitations with left arm pain.  This then radiated up to her left jaw and neck.  She had some mild chest pressure but no pain.  Patient states that her heart rate on her monitor went up to the \"170s\" and this lasted for about 20 minutes.  Then came back down to almost normal.  Patient has no fever or chills, no shortness of breath, no abdominal pain or back pain.    PAST MEDICAL HISTORY   has a past medical history of Allergic rhinitis, Arthritis, ASTHMA, Bronchitis, COPD (chronic obstructive pulmonary disease) ([facility]), Daytime sleepiness, Depression, H/O varicose vein ligation (3/26/2014), History of breast cancer (3/26/2014), Hyperthyroidism, Intention tremor (9/28/2016), Lumbar disc disease (3/26/2014), Migraines (3/26/2014), Obesity, Pneumonia, Pulmonary emphysema ([facility]), Pulmonary nodules (6/23/2017), Rheumatoid arthritis ([facility]), Rheumatoid factor positive (3/26/2014), Ringing in ears, Shortness of breath, Unspecified disorder of thyroid, and Wears glasses.    SURGICAL HISTORY   has a past surgical history that includes removal synovial cyst,knee (1948); vein ligation (1966); tubal ligation (1973); breast biopsy (1974); tonsillectomy (1958); basal cell excision (2009); knee arthroscopy (Right, 2/26/2010); and meniscectomy (Right, 2/26/2010).    FAMILY HISTORY  Family History   Problem Relation Age of Onset    Cancer Mother         breast    Lung Disease Father     Cancer Sister         breast, " "pancreatic    Cancer Brother     Cancer Maternal Aunt     Cancer Maternal Grandmother        SOCIAL HISTORY  Social History     Tobacco Use    Smoking status: Never    Smokeless tobacco: Never   Vaping Use    Vaping status: Never Used   Substance and Sexual Activity    Alcohol use: Yes     Alcohol/week: 1.8 - 2.4 oz     Types: 3 - 4 Glasses of wine per week     Comment: 3-4    Drug use: No    Sexual activity: Not Currently       CURRENT MEDICATIONS  Home Medications    **Home medications have not yet been reviewed for this encounter**       Audit from Redirected Encounters    **Home medications have not yet been reviewed for this encounter**         ALLERGIES  Allergies   Allergen Reactions    Asa [Aspirin]      Does not take D/T asthma hx    Quinine     Sulfa Drugs Rash     Skin peeling       PHYSICAL EXAM  VITAL SIGNS: BP (!) 149/66   Pulse (!) 114   Temp 37 °C (98.6 °F)   Resp 18   Ht 1.646 m (5' 4.8\")   Wt 71 kg (156 lb 8.4 oz)   SpO2 94%   BMI 26.21 kg/m²    Constitutional: Well developed, well nourished. No acute distress.  HEENT: Normocephalic, atraumatic. Posterior pharynx clear and moist.  Eyes:  EOMI. Normal sclera.  Neck: Supple, Full range of motion, nontender.  Chest/Pulmonary: clear to ausculation. Symmetrical expansion.   Cardio: Tachycardic rate and rhythm with no murmur.   Abdomen: Soft, nontender. No peritoneal signs. No guarding. No palpable masses.  Back: No CVA tenderness, nontender midline, no step offs.  Musculoskeletal: No deformity, no edema, neurovascular intact.   Neuro: Clear speech, appropriate, cooperative, cranial nerves II-XII grossly intact.  Psych: Normal mood and affect      EKG/LABS  Results for orders placed or performed during the hospital encounter of 07/27/24   CBC with Differential   Result Value Ref Range    WBC 7.8 4.8 - 10.8 K/uL    RBC 4.63 4.20 - 5.40 M/uL    Hemoglobin 15.4 12.0 - 16.0 g/dL    Hematocrit 45.1 37.0 - 47.0 %    MCV 97.4 81.4 - 97.8 fL    MCH 33.3 " (H) 27.0 - 33.0 pg    MCHC 34.1 32.2 - 35.5 g/dL    RDW 44.5 35.9 - 50.0 fL    Platelet Count 429 164 - 446 K/uL    MPV 9.1 9.0 - 12.9 fL    Neutrophils-Polys 40.50 (L) 44.00 - 72.00 %    Lymphocytes 39.40 22.00 - 41.00 %    Monocytes 13.90 (H) 0.00 - 13.40 %    Eosinophils 4.80 0.00 - 6.90 %    Basophils 1.10 0.00 - 1.80 %    Immature Granulocytes 0.30 0.00 - 0.90 %    Nucleated RBC 0.00 0.00 - 0.20 /100 WBC    Neutrophils (Absolute) 3.17 1.82 - 7.42 K/uL    Lymphs (Absolute) 3.09 1.00 - 4.80 K/uL    Monos (Absolute) 1.09 (H) 0.00 - 0.85 K/uL    Eos (Absolute) 0.38 0.00 - 0.51 K/uL    Baso (Absolute) 0.09 0.00 - 0.12 K/uL    Immature Granulocytes (abs) 0.02 0.00 - 0.11 K/uL    NRBC (Absolute) 0.00 K/uL   Complete Metabolic Panel (CMP)   Result Value Ref Range    Sodium 139 135 - 145 mmol/L    Potassium 3.2 (L) 3.6 - 5.5 mmol/L    Chloride 97 96 - 112 mmol/L    Co2 27 20 - 33 mmol/L    Anion Gap 15.0 7.0 - 16.0    Glucose 129 (H) 65 - 99 mg/dL    Bun 28 (H) 8 - 22 mg/dL    Creatinine 0.91 0.50 - 1.40 mg/dL    Calcium 10.2 8.5 - 10.5 mg/dL    Correct Calcium 10.1 8.5 - 10.5 mg/dL    AST(SGOT) 65 (H) 12 - 45 U/L    ALT(SGPT) 89 (H) 2 - 50 U/L    Alkaline Phosphatase 101 (H) 30 - 99 U/L    Total Bilirubin 0.4 0.1 - 1.5 mg/dL    Albumin 4.1 3.2 - 4.9 g/dL    Total Protein 7.5 6.0 - 8.2 g/dL    Globulin 3.4 1.9 - 3.5 g/dL    A-G Ratio 1.2 g/dL   Troponins NOW   Result Value Ref Range    Troponin T 9 6 - 19 ng/L   ESTIMATED GFR   Result Value Ref Range    GFR (CKD-EPI) 62 >60 mL/min/1.73 m 2   EKG   Result Value Ref Range    Report       [facility]    Test Date:  2024  Pt Name:    PAMELA ULLOA               Department: ER  MRN:        [id number]                      Room:        12  Gender:     Female                       Technician: [id number]  :        [id number]                   Requested By:ER TRIAGE PROTOCOL  Order #:    [id number]                    Reading  MD:    Measurements  Intervals                                Axis  Rate:       110                          P:          42  FL:         189                          QRS:        -19  QRSD:       93                           T:          55  QT:         327  QTc:        443    Interpretive Statements  Sinus tachycardia  Ventricular premature complex  Probable left atrial enlargement  Borderline left axis deviation  Low voltage, precordial leads  Abnormal R-wave progression, early transition  Minimal ST depression, lateral leads  Compared to ECG 09/12/2021 14:46:57  Ventricular premature complex(es) now present  Low QRS v oltage now present  ST (T wave) deviation now present  Sinus rhythm no longer present           RADIOLOGY/PROCEDURES   I have independently interpreted the diagnostic imaging associated with this visit and am waiting the final reading from the radiologist.   My preliminary interpretation is as follows: Below    Radiologist interpretation:  DX-CHEST-PORTABLE (1 VIEW)   Final Result         1.  No acute cardiopulmonary disease.          COURSE & MEDICAL DECISION MAKING    ASSESSMENT, COURSE AND PLAN  Care Narrative: This is an 83-year-old female here for evaluation of left arm pain, palpitations.  Patient's initial workup here is negative, however because of her symptoms and presentation, will admit her for chest pain rule out.  She will be admitted to the hospital.    Patient has heart score of 4      DISPOSITION AND DISCUSSIONS  I have discussed management of the patient with the following physicians and RUBY's: None      FINAL DIAGNOSIS  1. Palpitations    2. Chest pain, unspecified type           Electronically signed by: Fernando Wilkerson D.O., 7/27/2024 8:38 PM

## 2024-07-28 NOTE — ASSESSMENT & PLAN NOTE
Monitor on telemetry  Cardiac echo has been ordered  Continue oral Lasix she is not hypoxic  Strict ins and outs and daily weights

## 2024-07-28 NOTE — THERAPY
Occupational Therapy   Initial Evaluation     Patient Name: Magdi Pittman  Age:  83 y.o., Sex:  female  Medical Record #: [id number]  Today's Date: 7/28/2024     Precautions: Fall Risk    Assessment  Patient is 83 y.o. female admitted with heart palpitations with associated chest pain that radiates into the LUE and jaw. Chest X-ray found mild pulmonary edema with EKG showing sinus tachycardia with PVCs. Pt is pending stress test.  PMHx of hypothyroidism, HTN, hypokalemia, COPD, and depression. Pt seen for OT eval. Pt currently resides alone in an ILF and was independent with ADLs and most IADLs. Pt's ILF and daughter provided assist with remaining IADLs.    During OT eval, pt demo ability to complete ADLs, functional mobility, and txfs using supv. Pt has arthritis and tremors in BUE at baseline, resulting in difficulty grasping onto items. Pt reports that she occasionally drops items, but states that she is not ready to use AE. Pt has no acute OT needs, but would recommend home health for a home safety evaluation to assist with allowing pt to safely age in place as maintaining her independence is very important to her.     Patient will not be actively followed for occupational therapy services at this time, however may be seen if requested by physician for 1 more visit within 30 days to address any discharge or equipment needs.     Plan    Occupational Therapy Initial Treatment Plan   Duration: Discharge Needs Only    DC Equipment Recommendations: None  Discharge Recommendations: Recommend home health for continued occupational therapy services     Objective      Prior Living Situation   Prior Services Intermittent Physical Support for ADL Per Family;Housekeeping / Homemaker Services   Housing / Facility Independent Living Facility  ([facility])   Steps Into Home 0   Steps In Home   (Full flgith, however, pt has an elevator)   Elevator Yes   Bathroom Set up Walk In Shower;Grab Bars;Shower Chair  (Has a  shower chair, but reports that it does not fit in her shower)   Equipment Owned 4-Wheel Walker;Single Point Cane;Tub / Shower Seat;Grab Bar(s) In Tub / Shower;Grab Bar(s) By Toilet   Lives with - Patient's Self Care Capacity Alone and Able to Care For Self   Comments Pt currently resides alone, but reports that her daughter lives local and can assist as needed.   Prior Level of ADL Function   Self Feeding Independent   Grooming / Hygiene Independent   Bathing Independent   Dressing Independent   Toileting Independent   Prior Level of IADL Function   Medication Management Independent   Laundry Independent   Kitchen Mobility Independent  (Reports that facility provides 3 meals/day, but pt states that she prefers to make her own meals due to her tremors)   Finances Independent   Home Management Requires Assist  (Has a  that comes in 1x/week)   Shopping Independent   Prior Level Of Mobility Independent With Device in Community;Independent With Device in Home   Driving / Transportation Driving Independent   History of Falls   History of Falls No   Date of Last Fall   (Pt denies having a hx of falls; reports that she is very cautious when it comes to navigating her home environment)   Precautions   Precautions Fall Risk   Vitals   O2 Delivery Device None - Room Air   Pain 0 - 10 Group   Therapist Pain Assessment Post Activity Pain Same as Prior to Activity;Nurse Notified  (Not rated, agreeable to activity)   Cognition    Cognition / Consciousness WDL   Level of Consciousness Alert   Comments Very pleasant and cooperative   Active ROM Upper Body   Active ROM Upper Body  X   Dominant Hand Right   Comments Reports having arthritis in BUE at baseline. Unable to make a complete fist. Reports having occasional difficulty grasping items, but reports that she is not quite ready for AE   Strength Upper Body   Upper Body Strength  X   Gross Strength Generalized Weakness, Equal Bilaterally.    Sensation Upper Body    Comments Denies having altered sensation in BUE   Coordination Upper Body   Coordination X   Comments Impaired coordination at baseline due to arthritis and tremulous activity in BUE. Reports that she occasionally drops items while at home.   Balance Assessment   Sitting Balance (Static) Fair +   Sitting Balance (Dynamic) Fair +   Standing Balance (Static) Fair   Standing Balance (Dynamic) Fair   Weight Shift Sitting Good   Weight Shift Standing Fair   Comments w/FWW   Bed Mobility    Supine to Sit Supervised   Sit to Supine Supervised   Scooting Supervised   Rolling Supervised   Comments HOB elevated getting out of bed; HOB flat getting back into bed. Came into long sitting position   ADL Assessment   Eating Modified Independent   Grooming Supervision;Standing  (Oral care, face washing, and hand washing at sink)   Lower Body Dressing Supervision  (Demo ability to tailor sit to manage socks)   Toileting Supervision   How much help from another person does the patient currently need...   6 Clicks Daily Activity Score 23   Functional Mobility   Sit to Stand Supervised   Bed, Chair, Wheelchair Transfer Supervised   Toilet Transfers Supervised   Mobility EOB <> functional mobility in hallway to bathroom; w/FWW   Activity Tolerance   Comments Functional for self-care ADLs, no overt signs of WOB or fatigue   Short Term Goals   Short Term Goal # 1 Pt will have no further acute OT needs at [facility]   Education Group   Education Provided Role of Occupational Therapist;Activities of Daily Living;Home Safety   Role of Occupational Therapist Patient Response Patient;Acceptance;Explanation;Verbal Demonstration   Home Safety Patient Response Patient;Acceptance;Explanation;Verbal Demonstration   ADL Patient Response Patient;Acceptance;Explanation;Verbal Demonstration;Action Demonstration

## 2024-07-28 NOTE — ASSESSMENT & PLAN NOTE
Rule out ACS  Initial EKG and troponin negative for ischemia  Continuous cardiac monitoring with serial EKG and troponin  Nuclear medicine cardiac stress test in the morning if troponin remains negative  Patient has been given full dose of aspirin  Check lipid panel, and hemoglobin A1c  Nitro when necessary for chest pain

## 2024-07-28 NOTE — ED NOTES
Med rec is partially complete per patient at bedside. Patient is unsure of the strengths of some of her medications. Unable to verify medication history with patient's pharmacy, [facility] ([phone]), as the pharmacy is closed at this time. Pharmacy will be open tomorrow at 10:00.    Allergies reviewed with patient.    Outpatient antibiotics within the last 30 days: none.    ANTICOAGULANTS: none.

## 2024-07-28 NOTE — THERAPY
Physical Therapy Contact Note    Patient Name: Magdi Pittman  Age:  83 y.o., Sex:  female  Medical Record #: [id number]  Today's Date: 7/28/2024     Pt is an 83 y.o female who resides at [facility]. Pt presented to hospital on  7/27/2024 with palpitations. Initial EKG and troponin negative for ischemia  past medical history of hypothyroidism, hypertension, hypokalemia, COPD, depression.   Pt has all required DME and family assistance as needed. Denies history of falls. Pt up ambulating at baseline, denies needs for formal inpatient PT assessment      07/28/24 8715   Initial Contact Note    Initial Contact Note Order Received and Verified. Physical Therapy Evaluation NOT Completed Because Patient Does Not Require Acute Physical Therapy at this Time.   Prior Living Situation   Prior Services Intermittent Physical Support for ADL Per Family;Housekeeping / Homemaker Services   Housing / Facility Independent Living Facility   Elevator Yes   Bathroom Set up Walk In Shower   Equipment Owned Front-Wheel Walker;Single Point Cane;Tub / Shower Seat;Grab Bar(s) By Toilet   Lives with - Patient's Self Care Capacity Alone and Able to Care For Self   History of Falls   History of Falls No   Cognition    Level of Consciousness Alert   Comments pleasant, cooperative, good historian   Anticipated Discharge Equipment and Recommendations   DC Equipment Recommendations None   Discharge Recommendations Anticipate that the patient will have no further physical therapy needs after discharge from the hospital   Interdisciplinary Plan of Care Collaboration   IDT Collaboration with  Nursing;Family / Caregiver   Patient Position at End of Therapy In Bed;Phone within Reach;Tray Table within Reach;Call Light within Reach;Family / Friend in Room   Collaboration Comments RN updated. Pt reports she has been up ambulating to bathroom multiple time today w/o difficulty. Pt at her baseline level of functional mobility. Skilled inpatient nor post  acute PT is needed.   Session Information   Date / Session Number  7/28 No needs.

## 2024-07-28 NOTE — DISCHARGE SUMMARY
"Discharge Summary    CHIEF COMPLAINT ON ADMISSION  Chief Complaint   Patient presents with    Palpitations     X 1 hour         Reason for Admission  Palpitation     Admission Date  7/27/2024    CODE STATUS  Full Code    HPI & HOSPITAL COURSE  Per Dr. Forrest's HPI dated 7/27/2024:  \"Magdi Pittman is a 83 y.o. female who presented 7/27/2024 with past medical history of hypothyroidism, hypertension, hypokalemia, COPD, depression who presents to the hospital for palpitations.  It is associated with chest pain does radiate into the left jaw and left arm.  She denies any lightheadedness, syncope, nausea, vomiting, diarrhea, shortness of breath.  Patient occasionally does have fevers which are resolving now.  She checked her heart monitor and noticed her heart rate was ranging between 165-170.  8 months ago patient thyroid medications was increased where she was was taken extra pill every 7 days.  Patient has never had any heart issues in the past.     Chest x-ray interpreted by me found mild pulm edema  EKG interpreted by me found sinus tachycardia with PVCs\"    Pt was admitted for further management.     Hospital course under my care: afebrile and vitals wnl. Exam at bedside was grossly unremarkable. Labs and imagings findings discussed with patient. Serial trop flat.     Low TSH, borderline low free T4, anti-TPO + : hashimoto's? On [place] thyroid. We could have increased her armour thyroid but her symptomology is more concerning for hyperthyroid; hence, hold of adjustment in thyroid med for now.     Echo reviewed: normal LVEF, no wall motion abnormalities and no significant valvular diseases.     NST done was without reversible ischemia or jeopardized myocardium though fixed anteroseptal infarct. At this point, ACS is unlikely. Regarding her palpitations, I plan for Ziopatch and a close outpatient follow up.     Regarding low K (supplemented), Pt is on supplement at home. She stated she is not on HCTZ (I advised " double checking and hold off if possible).     Therefore, she is discharged in fair and stable condition to home with close outpatient follow-up.    The patient recovered much more quickly than anticipated on admission.    Discharge Date  07/28/24    FOLLOW UP ITEMS POST DISCHARGE  N/A    DISCHARGE DIAGNOSES  Principal Problem:    Chest pain (POA: Yes)  Active Problems:    Hypothyroid (POA: Yes)    Moderate episode of recurrent major depressive disorder (HCC) (POA: Yes)    Palpitations (POA: Yes)    Acute pulmonary edema (HCC) (POA: Yes)  Resolved Problems:    * No resolved hospital problems. *      FOLLOW UP  Please follow up with your primary care physician within 1 to 2 weeks of discharge. Tele health if available.    MEDICATIONS ON DISCHARGE     Medication List        CONTINUE taking these medications        Instructions   albuterol 108 (90 Base) MCG/ACT Aers inhalation aerosol  Commonly known as: ProAir HFA   Inhale 1-2 Puffs every 6 hours as needed for Shortness of Breath.  Dose: 1-2 Puff     [place] Thyroid 60 MG Tabs  Generic drug: thyroid   Take 60 mg by mouth every morning.  Dose: 60 mg     furosemide 20 MG Tabs  Commonly known as: Lasix   Take 20 mg by mouth every 48 hours.  Dose: 20 mg     Misc. Devices Misc   2L oxygen via nasal cannula at night while sleeping     multivitamin Tabs   Take 1 Tablet by mouth every day.  Dose: 1 Tablet     potassium chloride SA 20 MEQ Tbcr  Commonly known as: Kdur   Take 20 mEq by mouth every 48 hours.  Dose: 20 mEq     Tiotropium Bromide Monohydrate 1.25 MCG/ACT Aers   Inhale 2.5 mcg every day. 2 inhalations = 2.5 mcg  Dose: 2.5 mcg     Tylenol 8 Hour Arthritis Pain 650 MG CR tablet  Generic drug: acetaminophen   Take 650 mg by mouth 2 times a day as needed for Mild Pain.  Dose: 650 mg     Vitamin B Complex Tabs   Take 1 Tablet by mouth every day.  Dose: 1 Tablet     vitamin D 1000 UNIT Tabs  Commonly known as: Cholecalciferol   Take 1,000 Units by mouth every day.  Dose:  "1,000 Units            STOP taking these medications      triamterene/hctz 37.5-25 MG Caps  Commonly known as: Maxzide-25/Dyazide              Allergies  Allergies   Allergen Reactions    Sulfa Drugs Rash     Patient states that she felt extremely hot and had skin peeling, \"like a sunburn from inside out\"    Asa [Aspirin] Unspecified     States she was told not to take because of history of asthma    Quinine Diarrhea and Vomiting             DIET  Orders Placed This Encounter   Procedures    Diet Order Diet: Cardiac; Miscellaneous modifications: (optional): No Decaf, No Caffeine(for test)     Standing Status:   Standing     Number of Occurrences:   1     Order Specific Question:   Diet:     Answer:   Cardiac [6]     Order Specific Question:   Miscellaneous modifications: (optional)     Answer:   No Decaf, No Caffeine(for test) [11]       ACTIVITY  As tolerated.  Weight bearing as tolerated    CONSULTATIONS  N/A    PROCEDURES  N/A    LABORATORY  Lab Results   Component Value Date    SODIUM 140 07/28/2024    POTASSIUM 3.0 (L) 07/28/2024    CHLORIDE 101 07/28/2024    CO2 27 07/28/2024    GLUCOSE 94 07/28/2024    BUN 25 (H) 07/28/2024    CREATININE 0.62 07/28/2024        Lab Results   Component Value Date    WBC 5.5 07/28/2024    HEMOGLOBIN 13.7 07/28/2024    HEMATOCRIT 40.2 07/28/2024    PLATELETCT 329 07/28/2024        Total time of the discharge process exceeds 35 minutes.  "

## 2024-07-28 NOTE — CARE PLAN
The patient is Stable - Low risk of patient condition declining or worsening    Shift Goals  Clinical Goals: Stress Test, Echo, PT/OT  Patient Goals: Pain control, Rest  Family Goals: Feel better    Progress made toward(s) clinical / shift goals:      Problem: Pain - Standard  Goal: Alleviation of pain or a reduction in pain to the patient’s comfort goal  Description: Target End Date:  Prior to discharge or change in level of care    Document on Vitals flowsheet    1.  Document pain using the appropriate pain scale per order or unit policy  2.  Educate and implement non-pharmacologic comfort measures (i.e. relaxation, distraction, massage, cold/heat therapy, etc.)  3.  Pain management medications as ordered  4.  Reassess pain after pain med administration per policy  5.  If opiods administered assess patient's response to pain medication is appropriate per POSS sedation scale  6.  Follow pain management plan developed in collaboration with patient and interdisciplinary team (including palliative care or pain specialists if applicable)  Outcome: Progressing     Problem: Knowledge Deficit - Standard  Goal: Patient and family/care givers will demonstrate understanding of plan of care, disease process/condition, diagnostic tests and medications  Description: Target End Date:  1-3 days or as soon as patient condition allows    Document in Patient Education    1.  Patient and family/caregiver oriented to unit, equipment, visitation policy and means for communicating concern  2.  Complete/review Learning Assessment  3.  Assess knowledge level of disease process/condition, treatment plan, diagnostic tests and medications  4.  Explain disease process/condition, treatment plan, diagnostic tests and medications  Outcome: Progressing     Problem: Depression  Goal: Patient and family/caregiver will verbalize accurate information about at least two of the possible causes of depression, three-four of the signs and symptoms of  depression  Description: Target End Date:  1 to 3 days    1.  Assess the patient's and family/caregiver's knowledge regarding depression and its causes.  2.  Explain to the patient and family/caregiver regarding the major symptoms of depression.  3.  Inform the patient and family/caregiver that depression can be treated through medications and psychotherapy.  4.  Allow the patient to express feelings and perceptions  5.  Express hope to the patient with realistic comments about the patient's strengths and resources.  5.  Give positive feedback after a tasks are achieved.  6.  Encourage identification of positive aspects of self.  7.  Educate the patient about crisis intervention services such as suicide hotlines and other resources.  Outcome: Progressing     Problem: Skin Integrity  Goal: Skin integrity is maintained or improved  Description: Target End Date:  Prior to discharge or change in level of care    Document interventions on Skin Risk/Raghav flowsheet groups and corresponding LDA    1.  Assess and monitor skin integrity, appearance and/or temperature  2.  Assess risk factors for impaired skin integrity and/or pressures ulcers  3.  Implement precautions to protect skin integrity in collaboration with interdisciplinary team  4.  Implement pressure ulcer prevention protocol if at risk for skin breakdown  5.  Confirm wound care consult if at risk for skin breakdown  6.  Ensure patient use of pressure relieving devices  (Low air loss bed, waffle overlay, heel protectors, ROHO cushion, etc)  Outcome: Progressing     Problem: Optimal Care for the Acute Coronary Syndrome Patient  Goal: Optimal Care for the Acute Coronary Syndrome Patient  Description: Target End Date:  1 to 3 days  Outcome: Progressing

## 2024-07-28 NOTE — DISCHARGE INSTRUCTIONS
Discharge Instructions    Discharged to home by car with relative. Discharged via wheelchair, hospital escort: Yes.  Special equipment needed: Not Applicable    Be sure to schedule a follow-up appointment with your primary care doctor or any specialists as instructed.     Discharge Plan:   Diet Plan: Discussed  Activity Level: Discussed  Confirmed Follow up Appointment: Patient to Call and Schedule Appointment  Confirmed Symptoms Management: Discussed  Medication Reconciliation Updated: Yes    I understand that a diet low in cholesterol, fat, and sodium is recommended for good health. Unless I have been given specific instructions below for another diet, I accept this instruction as my diet prescription.   Other diet: Cardiac    Special Instructions: None    -Is this patient being discharged with medication to prevent blood clots?  No    Is patient discharged on Warfarin / Coumadin?   No

## 2024-07-28 NOTE — ASSESSMENT & PLAN NOTE
It is possible that she is taking too much of her thyroid medication as it may need to be adjusted  I believe she was taking 60 mg a day and has to take an additional 30 mg

## 2024-07-29 NOTE — CARE PLAN
The patient is Stable - Low risk of patient condition declining or worsening    Shift Goals  Clinical Goals: ST and Eco by 1300  Patient Goals: go home  Family Goals: Feel better    Progress made toward(s) clinical / shift goals:  Yes ST Eco were negative and Pt is going home    Patient is not progressing towards the following goals:

## 2024-07-30 ENCOUNTER — NON-PROVIDER VISIT (OUTPATIENT)
Dept: CARDIOLOGY | Facility: MEDICAL CENTER | Age: 84
End: 2024-07-30
Attending: NURSE PRACTITIONER
Payer: MEDICARE

## 2024-07-30 DIAGNOSIS — R00.2 PALPITATIONS: ICD-10-CM

## 2024-07-30 LAB — TSH RECEP AB SER-ACNC: <1.1 IU/L

## 2024-07-30 PROCEDURE — 93246 EXT ECG>7D<15D RECORDING: CPT

## 2024-08-20 ENCOUNTER — TELEPHONE (OUTPATIENT)
Dept: CARDIOLOGY | Facility: MEDICAL CENTER | Age: 84
End: 2024-08-20
Payer: MEDICARE

## 2024-08-20 NOTE — TELEPHONE ENCOUNTER
MILLIE EOS to TT for review on 8/21/24 as ADD    Preliminary findings:    3 episodes of SVT  with an avg rate of 125 bpm    Sinus rhythm  with an avg rate of 73 bpm    Rare supraventricular ectopy    2.0% isolated VE(s), rare couplets and triplets    No patient events

## 2024-08-30 ENCOUNTER — APPOINTMENT (OUTPATIENT)
Dept: CARDIOLOGY | Facility: MEDICAL CENTER | Age: 84
End: 2024-08-30
Payer: MEDICARE

## 2024-09-04 NOTE — PROGRESS NOTES
Cardiology Consultation Note    Date of note:    9/4/2024    Primary Care Provider: Gilson Snell M.D.  Referring Provider: self referral    Patient Name: Magdi Pittman     YOB: 1940  MRN:              [id number]      Chief Complaint   Patient presents with    Chest Pain    Palpitations          Subjective:   Magdi Pittman is a 83 y.o. female who presents today for initial consultation recommended by the [facility] Hospitalist.    In July of this year Magdi developed palpitations and checked her pulse oximeter which showed high heart rates, readings in the 160s. Her living facility called EMS and she was brought to the ER where she was observed for 24 hours. Her initial EKG showed sinus tachycardia with PVCs.  She underwent testing including an echocardiogram, troponin levels and telemetry monitoring all which were unremarkable.  She had a nuclear medicine stress test which showed small anteroseptal apical infarct without any ischemia.  Following discharge she wore a event monitor for 2 weeks which showed no sustained arrhythmias.    Magdi enjoys a good quality of life and is not bothered by any cardiac problems.  She has not had a reoccurrence of her palpitations since the hospitalization.  She has never seen a cardiologist before and has no history of coronary artery stents, known heart attacks, TIAs or strokes.  She is not interested in any procedures or treatments that would prolong life at the expense of her current quality of life.    Current medical problems include hypothyroidism managed by Dr. Snell, arthritis.    She enjoys 1 glass of wine per day.    Past Medical History:   Diagnosis Date    Allergic rhinitis     Arthritis     ASTHMA     Bronchitis     COPD (chronic obstructive pulmonary disease) (HCC)     Daytime sleepiness     Depression     H/O varicose vein ligation 3/26/2014    History of breast cancer 3/26/2014    Hyperthyroidism     Intention tremor 9/28/2016    Lumbar  "disc disease 3/26/2014    Migraines 3/26/2014    Obesity     Pneumonia     Pulmonary emphysema (HCC)     Pulmonary nodules 6/23/2017    Rheumatoid arthritis (HCC)     Rheumatoid factor positive 3/26/2014    -CCP, Negative hepatitis panel      Ringing in ears     Shortness of breath     Unspecified disorder of thyroid     Wears glasses        Social History     Tobacco Use   Smoking Status Never   Smokeless Tobacco Never          Social History     Substance and Sexual Activity   Alcohol Use Yes    Alcohol/week: 1.8 - 2.4 oz    Types: 3 - 4 Glasses of wine per week    Comment: 3-4         Family History   Problem Relation Age of Onset    Cancer Mother         breast    Lung Disease Father     Cancer Sister         breast, pancreatic    Cancer Brother     Cancer Maternal Aunt     Cancer Maternal Grandmother          Allergies   Allergen Reactions    Sulfa Drugs Rash     Patient states that she felt extremely hot and had skin peeling, \"like a sunburn from inside out\"    Asa [Aspirin] Unspecified     States she was told not to take because of history of asthma    Quinine Diarrhea and Vomiting               Current Outpatient Medications   Medication Sig Dispense Refill    thyroid (ARMOUR THYROID) 60 MG Tab Take 60 mg by mouth every morning.      furosemide (LASIX) 20 MG Tab Take 20 mg by mouth every 48 hours.      Tiotropium Bromide Monohydrate 1.25 MCG/ACT Aero Soln Inhale 2.5 mcg every day. 2 inhalations = 2.5 mcg      acetaminophen (TYLENOL 8 HOUR ARTHRITIS PAIN) 650 MG CR tablet Take 650 mg by mouth 2 times a day as needed for Mild Pain.      B Complex Vitamins (VITAMIN B COMPLEX) Tab Take 1 Tablet by mouth every day.      multivitamin Tab Take 1 Tablet by mouth every day.      potassium chloride SA (KDUR) 20 MEQ Tab CR Take 20 mEq by mouth every 48 hours.      albuterol (PROAIR HFA) 108 (90 Base) MCG/ACT Aero Soln inhalation aerosol Inhale 1-2 Puffs every 6 hours as needed for Shortness of Breath. 1 Each 3    " "Cholecalciferol (VITAMIND D3) 1000 UNIT Tab Take 1,000 Units by mouth every day.      Misc. Devices Misc 2L oxygen via nasal cannula at night while sleeping 1 Units 0     No current facility-administered medications for this visit.         Review of Systems   Respiratory:  Negative for cough and shortness of breath.    Cardiovascular:  Negative for chest pain, palpitations, orthopnea and PND.   Musculoskeletal:  Positive for joint pain and myalgias.   Neurological:  Negative for dizziness and loss of consciousness.          Objective:   BP (!) 148/68 (BP Location: Left arm, Patient Position: Sitting, BP Cuff Size: Adult)   Pulse 77   Resp 14   Ht 1.6 m (5' 3\")   Wt 71.2 kg (157 lb)   SpO2 92%   BMI 27.81 kg/m²        Physical Exam  Vitals reviewed.   HENT:      Head: Normocephalic and atraumatic.   Cardiovascular:      Rate and Rhythm: Normal rate and regular rhythm.      Heart sounds: No murmur heard.  Pulmonary:      Effort: Pulmonary effort is normal. No respiratory distress.      Breath sounds: No rales.   Abdominal:      General: There is no distension.   Musculoskeletal:      Right lower leg: No edema.      Left lower leg: No edema.   Skin:     General: Skin is warm and dry.   Neurological:      General: No focal deficit present.      Mental Status: She is alert.      Gait: Gait normal.   Psychiatric:         Judgment: Judgment normal.          Cardiac Imaging and Procedures Review:    EKG 9/5/24 : Sinus rhythm    Echo 7/28/24:   CONCLUSIONS  Challenging parastsernal views.  Normal left and right ventricular sysotlic function.  Visually   estimated LVEF of 55-60%.   Grossly normal trilealfet aortic valve without significant stenosis and   trace to mild regurgitation.  No prior echo for comparison.    Stress testing (7/28/24):  Myocardial Perfusion   Report   NUCLEAR IMAGING INTERPRETATION   Small anteroseptal apical infarct.   No reversible ischemia.   Preserved LEFT ventricular function.   ECG " INTERPRETATION   Nondiagnostic due to pharmacologic stress test.      Assessment:     1. Age-related physical debility        2. Palpitations  EKG      3. PVCs (premature ventricular contractions)        4. Hypothyroidism due to acquired atrophy of thyroid             Medical Decision Making:  Today's Assessment / Plan:   Hospital follow up for paroxysmal tachycardia versus PVCs  - She had a full cardiac work up including stress testing, echo and 2 week event monitor which all showed expected findings for her age; she had no sustained arrhythmias, high risk ischemia or severe valve disease. We discussed the infarct finding on her stress testing and based on her preferences for quality of life now we agreed to avoid aspirin and statin therapy. We discussed vagal maneuvers for recurrent palpitations. If they occur regularly could use a beta blocker as needed. Otherwise she'll follow up with her primary regarding her low T4 and TSH numbers.       Thank you for this consultation, it was a pleasure to meet Mrs. Pittman. She can return to the cardiology office as needed.    Brandi Hernandez PA-C  [facility]

## 2024-09-05 ENCOUNTER — OFFICE VISIT (OUTPATIENT)
Dept: CARDIOLOGY | Facility: MEDICAL CENTER | Age: 84
End: 2024-09-05
Attending: PHYSICIAN ASSISTANT
Payer: MEDICARE

## 2024-09-05 VITALS
DIASTOLIC BLOOD PRESSURE: 68 MMHG | BODY MASS INDEX: 27.82 KG/M2 | SYSTOLIC BLOOD PRESSURE: 148 MMHG | WEIGHT: 157 LBS | OXYGEN SATURATION: 92 % | RESPIRATION RATE: 14 BRPM | HEIGHT: 63 IN | HEART RATE: 77 BPM

## 2024-09-05 DIAGNOSIS — R00.2 PALPITATIONS: ICD-10-CM

## 2024-09-05 DIAGNOSIS — I49.3 PVCS (PREMATURE VENTRICULAR CONTRACTIONS): ICD-10-CM

## 2024-09-05 DIAGNOSIS — R54 AGE-RELATED PHYSICAL DEBILITY: ICD-10-CM

## 2024-09-05 DIAGNOSIS — E03.4 HYPOTHYROIDISM DUE TO ACQUIRED ATROPHY OF THYROID: ICD-10-CM

## 2024-09-05 LAB — EKG IMPRESSION: NORMAL

## 2024-09-05 PROCEDURE — 93010 ELECTROCARDIOGRAM REPORT: CPT | Performed by: INTERNAL MEDICINE

## 2024-09-05 PROCEDURE — 3077F SYST BP >= 140 MM HG: CPT | Performed by: PHYSICIAN ASSISTANT

## 2024-09-05 PROCEDURE — 93005 ELECTROCARDIOGRAM TRACING: CPT | Performed by: PHYSICIAN ASSISTANT

## 2024-09-05 PROCEDURE — 99203 OFFICE O/P NEW LOW 30 MIN: CPT | Performed by: PHYSICIAN ASSISTANT

## 2024-09-05 PROCEDURE — 99212 OFFICE O/P EST SF 10 MIN: CPT | Performed by: PHYSICIAN ASSISTANT

## 2024-09-05 PROCEDURE — 3078F DIAST BP <80 MM HG: CPT | Performed by: PHYSICIAN ASSISTANT

## 2024-09-05 ASSESSMENT — ENCOUNTER SYMPTOMS
DIZZINESS: 0
MYALGIAS: 1
PND: 0
LOSS OF CONSCIOUSNESS: 0
PALPITATIONS: 0
COUGH: 0
ORTHOPNEA: 0
SHORTNESS OF BREATH: 0

## 2024-09-05 ASSESSMENT — FIBROSIS 4 INDEX: FIB4 SCORE: 1.64
